# Patient Record
Sex: FEMALE | Race: OTHER | HISPANIC OR LATINO
[De-identification: names, ages, dates, MRNs, and addresses within clinical notes are randomized per-mention and may not be internally consistent; named-entity substitution may affect disease eponyms.]

---

## 2017-01-05 DIAGNOSIS — Z23 ENCOUNTER FOR IMMUNIZATION: ICD-10-CM

## 2017-01-23 ENCOUNTER — APPOINTMENT (OUTPATIENT)
Dept: FAMILY MEDICINE | Facility: HOSPITAL | Age: 32
End: 2017-01-23

## 2017-02-06 ENCOUNTER — APPOINTMENT (OUTPATIENT)
Dept: FAMILY MEDICINE | Facility: HOSPITAL | Age: 32
End: 2017-02-06

## 2017-02-12 ENCOUNTER — RESULT REVIEW (OUTPATIENT)
Age: 32
End: 2017-02-12

## 2017-02-13 ENCOUNTER — APPOINTMENT (OUTPATIENT)
Dept: FAMILY MEDICINE | Facility: HOSPITAL | Age: 32
End: 2017-02-13

## 2017-02-13 ENCOUNTER — OUTPATIENT (OUTPATIENT)
Dept: OUTPATIENT SERVICES | Facility: HOSPITAL | Age: 32
LOS: 1 days | End: 2017-02-13
Payer: SELF-PAY

## 2017-02-13 VITALS
HEART RATE: 84 BPM | SYSTOLIC BLOOD PRESSURE: 108 MMHG | TEMPERATURE: 97.6 F | RESPIRATION RATE: 16 BRPM | DIASTOLIC BLOOD PRESSURE: 70 MMHG

## 2017-02-13 PROCEDURE — 88305 TISSUE EXAM BY PATHOLOGIST: CPT | Mod: 26

## 2017-02-13 PROCEDURE — 57454 BX/CURETT OF CERVIX W/SCOPE: CPT

## 2017-02-13 PROCEDURE — 87491 CHLMYD TRACH DNA AMP PROBE: CPT

## 2017-02-13 PROCEDURE — 88305 TISSUE EXAM BY PATHOLOGIST: CPT

## 2017-02-13 PROCEDURE — 87591 N.GONORRHOEAE DNA AMP PROB: CPT

## 2017-02-13 PROCEDURE — G0463: CPT

## 2017-03-22 ENCOUNTER — EMERGENCY (EMERGENCY)
Facility: HOSPITAL | Age: 32
LOS: 1 days | Discharge: HOME | End: 2017-03-22
Admitting: EMERGENCY MEDICINE
Payer: MEDICAID

## 2017-03-22 PROCEDURE — 93010 ELECTROCARDIOGRAM REPORT: CPT

## 2017-03-22 PROCEDURE — 70450 CT HEAD/BRAIN W/O DYE: CPT | Mod: 26

## 2017-03-22 PROCEDURE — 99284 EMERGENCY DEPT VISIT MOD MDM: CPT

## 2017-03-22 PROCEDURE — 71010: CPT | Mod: 26

## 2017-03-23 PROCEDURE — 96375 TX/PRO/DX INJ NEW DRUG ADDON: CPT

## 2017-03-23 PROCEDURE — 99284 EMERGENCY DEPT VISIT MOD MDM: CPT | Mod: 25

## 2017-03-23 PROCEDURE — 85027 COMPLETE CBC AUTOMATED: CPT

## 2017-03-23 PROCEDURE — 80048 BASIC METABOLIC PNL TOTAL CA: CPT

## 2017-03-23 PROCEDURE — 81025 URINE PREGNANCY TEST: CPT

## 2017-03-23 PROCEDURE — 71045 X-RAY EXAM CHEST 1 VIEW: CPT

## 2017-03-23 PROCEDURE — 96374 THER/PROPH/DIAG INJ IV PUSH: CPT

## 2017-03-23 PROCEDURE — 70450 CT HEAD/BRAIN W/O DYE: CPT

## 2017-03-23 PROCEDURE — 93005 ELECTROCARDIOGRAM TRACING: CPT

## 2017-03-23 PROCEDURE — 84484 ASSAY OF TROPONIN QUANT: CPT

## 2017-03-23 PROCEDURE — 81003 URINALYSIS AUTO W/O SCOPE: CPT

## 2017-08-24 ENCOUNTER — OUTPATIENT (OUTPATIENT)
Dept: OUTPATIENT SERVICES | Facility: HOSPITAL | Age: 32
LOS: 1 days | End: 2017-08-24
Payer: SELF-PAY

## 2017-08-24 ENCOUNTER — RESULT REVIEW (OUTPATIENT)
Age: 32
End: 2017-08-24

## 2017-08-24 ENCOUNTER — APPOINTMENT (OUTPATIENT)
Dept: FAMILY MEDICINE | Facility: HOSPITAL | Age: 32
End: 2017-08-24

## 2017-08-24 VITALS
RESPIRATION RATE: 16 BRPM | OXYGEN SATURATION: 98 % | WEIGHT: 186 LBS | DIASTOLIC BLOOD PRESSURE: 78 MMHG | SYSTOLIC BLOOD PRESSURE: 116 MMHG | BODY MASS INDEX: 35.12 KG/M2 | HEIGHT: 61 IN | HEART RATE: 87 BPM | TEMPERATURE: 98 F

## 2017-08-24 DIAGNOSIS — M54.9 DORSALGIA, UNSPECIFIED: ICD-10-CM

## 2017-08-24 DIAGNOSIS — R10.32 LEFT LOWER QUADRANT PAIN: ICD-10-CM

## 2017-08-24 DIAGNOSIS — Z87.19 PERSONAL HISTORY OF OTHER DISEASES OF THE DIGESTIVE SYSTEM: ICD-10-CM

## 2017-08-24 DIAGNOSIS — R10.12 LEFT UPPER QUADRANT PAIN: ICD-10-CM

## 2017-08-24 DIAGNOSIS — R07.89 OTHER CHEST PAIN: ICD-10-CM

## 2017-08-24 PROCEDURE — 87624 HPV HI-RISK TYP POOLED RSLT: CPT

## 2017-08-24 PROCEDURE — 87591 N.GONORRHOEAE DNA AMP PROB: CPT

## 2017-08-24 PROCEDURE — 87563 M. GENITALIUM AMP PROBE: CPT

## 2017-08-24 PROCEDURE — 87801 DETECT AGNT MULT DNA AMPLI: CPT

## 2017-08-24 PROCEDURE — G0463: CPT

## 2017-08-24 PROCEDURE — 88175 CYTOPATH C/V AUTO FLUID REDO: CPT

## 2017-08-24 PROCEDURE — 87798 DETECT AGENT NOS DNA AMP: CPT

## 2017-08-24 PROCEDURE — 87491 CHLMYD TRACH DNA AMP PROBE: CPT

## 2017-08-31 LAB
CYTOLOGY CVX/VAG DOC THIN PREP: NORMAL
HPV I/H RISK 3 DNA ANAL QL PROBE+SIG AMP: NOT DETECTED

## 2017-10-23 ENCOUNTER — EMERGENCY (EMERGENCY)
Facility: HOSPITAL | Age: 32
LOS: 1 days | Discharge: HOME | End: 2017-10-23
Admitting: EMERGENCY MEDICINE
Payer: MEDICAID

## 2017-10-23 PROCEDURE — 99284 EMERGENCY DEPT VISIT MOD MDM: CPT | Mod: 25

## 2017-10-24 PROCEDURE — 93010 ELECTROCARDIOGRAM REPORT: CPT

## 2017-10-25 PROCEDURE — 80048 BASIC METABOLIC PNL TOTAL CA: CPT

## 2017-10-25 PROCEDURE — 96361 HYDRATE IV INFUSION ADD-ON: CPT

## 2017-10-25 PROCEDURE — 96374 THER/PROPH/DIAG INJ IV PUSH: CPT

## 2017-10-25 PROCEDURE — 81003 URINALYSIS AUTO W/O SCOPE: CPT

## 2017-10-25 PROCEDURE — 85027 COMPLETE CBC AUTOMATED: CPT

## 2017-10-25 PROCEDURE — 93005 ELECTROCARDIOGRAM TRACING: CPT

## 2017-10-25 PROCEDURE — 99284 EMERGENCY DEPT VISIT MOD MDM: CPT | Mod: 25

## 2017-12-12 ENCOUNTER — APPOINTMENT (OUTPATIENT)
Dept: FAMILY MEDICINE | Facility: HOSPITAL | Age: 32
End: 2017-12-12

## 2017-12-12 ENCOUNTER — OUTPATIENT (OUTPATIENT)
Dept: OUTPATIENT SERVICES | Facility: HOSPITAL | Age: 32
LOS: 1 days | End: 2017-12-12
Payer: SELF-PAY

## 2017-12-12 VITALS
OXYGEN SATURATION: 96 % | DIASTOLIC BLOOD PRESSURE: 90 MMHG | SYSTOLIC BLOOD PRESSURE: 137 MMHG | BODY MASS INDEX: 34.58 KG/M2 | HEART RATE: 67 BPM | TEMPERATURE: 98.8 F | WEIGHT: 183 LBS

## 2017-12-12 DIAGNOSIS — Z00.00 ENCOUNTER FOR GENERAL ADULT MEDICAL EXAMINATION WITHOUT ABNORMAL FINDINGS: ICD-10-CM

## 2017-12-12 PROCEDURE — G0008: CPT

## 2017-12-12 PROCEDURE — G0463: CPT

## 2017-12-14 DIAGNOSIS — R51 HEADACHE: ICD-10-CM

## 2017-12-14 DIAGNOSIS — Z23 ENCOUNTER FOR IMMUNIZATION: ICD-10-CM

## 2017-12-14 DIAGNOSIS — M54.9 DORSALGIA, UNSPECIFIED: ICD-10-CM

## 2018-01-29 ENCOUNTER — RESULT CHARGE (OUTPATIENT)
Age: 33
End: 2018-01-29

## 2018-01-29 ENCOUNTER — OUTPATIENT (OUTPATIENT)
Dept: OUTPATIENT SERVICES | Facility: HOSPITAL | Age: 33
LOS: 1 days | End: 2018-01-29
Payer: SELF-PAY

## 2018-01-29 ENCOUNTER — APPOINTMENT (OUTPATIENT)
Dept: FAMILY MEDICINE | Facility: HOSPITAL | Age: 33
End: 2018-01-29

## 2018-01-29 VITALS
WEIGHT: 183 LBS | OXYGEN SATURATION: 99 % | RESPIRATION RATE: 16 BRPM | TEMPERATURE: 98.4 F | HEART RATE: 85 BPM | SYSTOLIC BLOOD PRESSURE: 123 MMHG | DIASTOLIC BLOOD PRESSURE: 76 MMHG | BODY MASS INDEX: 34.55 KG/M2 | HEIGHT: 61 IN

## 2018-01-29 DIAGNOSIS — Z00.00 ENCOUNTER FOR GENERAL ADULT MEDICAL EXAMINATION WITHOUT ABNORMAL FINDINGS: ICD-10-CM

## 2018-01-29 LAB
HCG UR QL: NEGATIVE
QUALITY CONTROL: YES

## 2018-01-29 PROCEDURE — 87491 CHLMYD TRACH DNA AMP PROBE: CPT

## 2018-01-29 PROCEDURE — 87798 DETECT AGENT NOS DNA AMP: CPT

## 2018-01-29 PROCEDURE — 87801 DETECT AGNT MULT DNA AMPLI: CPT

## 2018-01-29 PROCEDURE — 87591 N.GONORRHOEAE DNA AMP PROB: CPT

## 2018-01-29 PROCEDURE — 87563 M. GENITALIUM AMP PROBE: CPT

## 2018-01-29 PROCEDURE — G0463: CPT

## 2018-01-31 DIAGNOSIS — N89.8 OTHER SPECIFIED NONINFLAMMATORY DISORDERS OF VAGINA: ICD-10-CM

## 2018-01-31 DIAGNOSIS — R10.2 PELVIC AND PERINEAL PAIN: ICD-10-CM

## 2018-05-10 ENCOUNTER — APPOINTMENT (OUTPATIENT)
Dept: FAMILY MEDICINE | Facility: HOSPITAL | Age: 33
End: 2018-05-10

## 2018-05-10 ENCOUNTER — RESULT CHARGE (OUTPATIENT)
Age: 33
End: 2018-05-10

## 2018-05-10 ENCOUNTER — OUTPATIENT (OUTPATIENT)
Dept: OUTPATIENT SERVICES | Facility: HOSPITAL | Age: 33
LOS: 1 days | End: 2018-05-10
Payer: COMMERCIAL

## 2018-05-10 VITALS
RESPIRATION RATE: 16 BRPM | SYSTOLIC BLOOD PRESSURE: 120 MMHG | DIASTOLIC BLOOD PRESSURE: 85 MMHG | TEMPERATURE: 98.6 F | HEART RATE: 109 BPM | WEIGHT: 181 LBS | BODY MASS INDEX: 34.2 KG/M2 | OXYGEN SATURATION: 99 %

## 2018-05-10 DIAGNOSIS — Z00.00 ENCOUNTER FOR GENERAL ADULT MEDICAL EXAMINATION WITHOUT ABNORMAL FINDINGS: ICD-10-CM

## 2018-05-10 DIAGNOSIS — N76.0 ACUTE VAGINITIS: ICD-10-CM

## 2018-05-10 DIAGNOSIS — B37.3 CANDIDIASIS OF VULVA AND VAGINA: ICD-10-CM

## 2018-05-10 DIAGNOSIS — Z87.898 PERSONAL HISTORY OF OTHER SPECIFIED CONDITIONS: ICD-10-CM

## 2018-05-10 DIAGNOSIS — Z87.39 PERSONAL HISTORY OF OTHER DISEASES OF THE MUSCULOSKELETAL SYSTEM AND CONNECTIVE TISSUE: ICD-10-CM

## 2018-05-10 DIAGNOSIS — Z92.29 PERSONAL HISTORY OF OTHER DRUG THERAPY: ICD-10-CM

## 2018-05-10 DIAGNOSIS — N89.8 OTHER SPECIFIED NONINFLAMMATORY DISORDERS OF VAGINA: ICD-10-CM

## 2018-05-10 DIAGNOSIS — N20.0 CALCULUS OF KIDNEY: ICD-10-CM

## 2018-05-10 DIAGNOSIS — Z98.890 OTHER SPECIFIED POSTPROCEDURAL STATES: ICD-10-CM

## 2018-05-10 DIAGNOSIS — R87.610 ATYPICAL SQUAMOUS CELLS OF UNDETERMINED SIGNIFICANCE ON CYTOLOGIC SMEAR OF CERVIX (ASC-US): ICD-10-CM

## 2018-05-10 DIAGNOSIS — Z86.19 PERSONAL HISTORY OF OTHER INFECTIOUS AND PARASITIC DISEASES: ICD-10-CM

## 2018-05-10 DIAGNOSIS — B96.89 ACUTE VAGINITIS: ICD-10-CM

## 2018-05-10 DIAGNOSIS — R07.89 OTHER CHEST PAIN: ICD-10-CM

## 2018-05-10 DIAGNOSIS — R87.810 ATYPICAL SQUAMOUS CELLS OF UNDETERMINED SIGNIFICANCE ON CYTOLOGIC SMEAR OF CERVIX (ASC-US): ICD-10-CM

## 2018-05-10 DIAGNOSIS — Z86.59 PERSONAL HISTORY OF OTHER MENTAL AND BEHAVIORAL DISORDERS: ICD-10-CM

## 2018-05-10 DIAGNOSIS — N87.0 MILD CERVICAL DYSPLASIA: ICD-10-CM

## 2018-05-10 DIAGNOSIS — Z87.42 PERSONAL HISTORY OF OTHER DISEASES OF THE FEMALE GENITAL TRACT: ICD-10-CM

## 2018-05-10 DIAGNOSIS — O92.6 GALACTORRHEA: ICD-10-CM

## 2018-05-10 PROCEDURE — 87798 DETECT AGENT NOS DNA AMP: CPT

## 2018-05-10 PROCEDURE — 36415 COLL VENOUS BLD VENIPUNCTURE: CPT

## 2018-05-10 PROCEDURE — 87070 CULTURE OTHR SPECIMN AEROBIC: CPT

## 2018-05-10 PROCEDURE — 87563 M. GENITALIUM AMP PROBE: CPT

## 2018-05-10 PROCEDURE — 84146 ASSAY OF PROLACTIN: CPT

## 2018-05-10 PROCEDURE — 87491 CHLMYD TRACH DNA AMP PROBE: CPT

## 2018-05-10 PROCEDURE — G0463: CPT

## 2018-05-10 PROCEDURE — 87591 N.GONORRHOEAE DNA AMP PROB: CPT

## 2018-05-10 PROCEDURE — 87801 DETECT AGNT MULT DNA AMPLI: CPT

## 2018-05-11 DIAGNOSIS — L53.9 ERYTHEMATOUS CONDITION, UNSPECIFIED: ICD-10-CM

## 2018-05-11 DIAGNOSIS — R10.2 PELVIC AND PERINEAL PAIN: ICD-10-CM

## 2018-05-11 DIAGNOSIS — N64.4 MASTODYNIA: ICD-10-CM

## 2018-05-16 ENCOUNTER — EMERGENCY (EMERGENCY)
Facility: HOSPITAL | Age: 33
LOS: 1 days | Discharge: HOME | End: 2018-05-16
Attending: EMERGENCY MEDICINE | Admitting: EMERGENCY MEDICINE
Payer: COMMERCIAL

## 2018-05-16 VITALS
OXYGEN SATURATION: 99 % | DIASTOLIC BLOOD PRESSURE: 86 MMHG | SYSTOLIC BLOOD PRESSURE: 134 MMHG | TEMPERATURE: 99 F | HEART RATE: 89 BPM | RESPIRATION RATE: 16 BRPM

## 2018-05-16 PROCEDURE — 99284 EMERGENCY DEPT VISIT MOD MDM: CPT | Mod: 25

## 2018-05-16 RX ORDER — VALACYCLOVIR 500 MG/1
1 TABLET, FILM COATED ORAL
Qty: 21 | Refills: 0 | OUTPATIENT
Start: 2018-05-16 | End: 2018-05-22

## 2018-05-16 RX ORDER — IBUPROFEN 200 MG
600 TABLET ORAL ONCE
Qty: 0 | Refills: 0 | Status: COMPLETED | OUTPATIENT
Start: 2018-05-16 | End: 2018-05-16

## 2018-05-16 RX ORDER — ACETAMINOPHEN 500 MG
975 TABLET ORAL ONCE
Qty: 0 | Refills: 0 | Status: COMPLETED | OUTPATIENT
Start: 2018-05-16 | End: 2018-05-16

## 2018-05-16 RX ADMIN — Medication 975 MILLIGRAM(S): at 23:41

## 2018-05-16 RX ADMIN — Medication 600 MILLIGRAM(S): at 23:41

## 2018-05-16 RX ADMIN — Medication 50 MILLIGRAM(S): at 23:41

## 2018-05-16 NOTE — ED PROVIDER NOTE - OBJECTIVE STATEMENT
[id number]    31 yo previously healthy F p/w 1 day hx of pinching L sided frontal HA that radiates to her occipital region associated with 1 week hx of R fingers numbness that progressed to her R shoulder. 3 d/a pt went to PCP for numbness     PCP: Lorin DAVIS: none [id number]    31 yo previously healthy F p/w 1 day hx of pinching L sided frontal HA that started and persisted at a 9/10 in severity that radiates to her occipital region associated with 1 week hx of bilat finger numbness radiating to her bilat shoulders. Pt had similar HA 6 years ago that resolved without imaging, this HA is similar and not worse than that HA, no other hx of HAs. Pt took ibuprofen today with some relief of HA. 3 d/a pt went to PCP for numbness in her bilat arms and was not given meds or imaging. Pt has also had R breast pain with a few days of R breast discharge.     ROS positive:     PCP: Lorin SIMONN: none [id number]      33 yo previously healthy F p/w 1 day hx of pinching L sided frontal HA that started and persisted at a 9/10 in severity that radiates to her occipital region associated with hearing loss in L ear, tinnitus in L ear, and 1 week hx of bilat finger numbness radiating to her bilat shoulders. Pt had similar HA 6 years ago that resolved without imaging, this HA is similar and not worse than that HA, no other hx of HAs. Pt took ibuprofen today with some relief of HA. 3 d/a pt went to PCP for numbness in her bilat arms and was not given meds or imaging. Pt has also had R breast pain with a few days of R breast discharge.     ROS positive: HA, bilat arm numbness, L ear hearing loss and tinnitus, breast discharge, chills, CP  ROS negative: fever, SOB, n/v, abdominal pain, dysuria, hematuria, congestion, runny nose, sore throat, visual changes, neck pain    PCP: Lorin DAVIS: none

## 2018-05-16 NOTE — ED PROVIDER NOTE - ATTENDING CONTRIBUTION TO CARE
31 yo previously healthy F p/w 1 day hx of pinching L sided frontal HA that started and persisted at a 9/10 in severity that radiates to her occipital region associated with pain noted, l facial with bell's palsy noted upper and lower mild in nature, steroids, pain control, ct head due to hearing loss b/l, non focal neuro exam. steroids started.

## 2018-05-16 NOTE — ED PROVIDER NOTE - PLAN OF CARE
1. You were seen for a headache. A copy of your resulted labs, imaging, and findings have been provided to you.  2. Take over the counter motrin 600 mg with food every six hours (do not exceed 3,200 mg in a 24 hour period) and supplement with tylenol 650 mg every six hours (do not exceed 4000 mg of tylenol in a 24 hour period and do not drink alcohol while taking tylenol) between the motrin dosages to have a layered effect. Consult a pharmacist or your primary care doctor with any questions.   3. Follow up with A NEUROLOGIST AND AN OBGYN AND your primary care doctor within 48 hours. Please call [phone] to make an appointment or with any questions you may have.  4. Return immediately to the emergency department for new, persistent, or worsening symptoms or signs. Return immediately to the emergency department if you have chest pain, shortness of breath, loss of consciousness, fever, neck pain, vision changes, or inability to move your arms or legs.

## 2018-05-16 NOTE — ED PROVIDER NOTE - CARE PLAN
Principal Discharge DX:	Headache Principal Discharge DX:	Headache  Assessment and plan of treatment:	1. You were seen for a headache. A copy of your resulted labs, imaging, and findings have been provided to you.  2. Take over the counter motrin 600 mg with food every six hours (do not exceed 3,200 mg in a 24 hour period) and supplement with tylenol 650 mg every six hours (do not exceed 4000 mg of tylenol in a 24 hour period and do not drink alcohol while taking tylenol) between the motrin dosages to have a layered effect. Consult a pharmacist or your primary care doctor with any questions.   3. Follow up with A NEUROLOGIST AND AN OBGYN AND your primary care doctor within 48 hours. Please call [phone] to make an appointment or with any questions you may have.  4. Return immediately to the emergency department for new, persistent, or worsening symptoms or signs. Return immediately to the emergency department if you have chest pain, shortness of breath, loss of consciousness, fever, neck pain, vision changes, or inability to move your arms or legs.

## 2018-05-16 NOTE — ED PROVIDER NOTE - PHYSICAL EXAMINATION
GENERAL: Well appearing, well nourished, awake, alert and in NAD  ENMT: Airway patent, Nasal mucosa clear. Mouth with MMM. Throat has no vesicles, no oropharyngeal exudates and uvula is midline.  EYES: conjunctiva clear, sclera anicteric, PERRL  CARDIAC: Regular rate, regular rhythm.  Heart sounds S1, S2, no S3, S4. No murmurs, rubs or gallops.  RESPIRATORY: Breath sounds clear and equal in bilateral anterior lung fields, no wheezes/ronchi/stridor; pt breathing and speaking comfortably with no increased WOB, no accessory mm. use, no intercostal retractions, no nasal flaring  GI: no ecchymosis, erythema, or lacerations, abdomen soft, non-distended, non-tender, no rebound or guarding. GENERAL: Well appearing, well nourished, awake, alert and in NAD  ENMT: Airway patent, Nasal mucosa clear. Mouth with MMM. Throat has no vesicles, no oropharyngeal exudates and uvula is midline.  EYES: conjunctiva clear, sclera anicteric, PERRL, no nystagmus bilat   CARDIAC: Regular rate, regular rhythm.  Heart sounds S1, S2, no S3, S4. No murmurs, rubs or gallops.  RESPIRATORY: Breath sounds clear and equal in bilateral anterior lung fields, no wheezes/ronchi/stridor; pt breathing and speaking comfortably with no increased WOB, no accessory mm. use, no intercostal retractions, no nasal flaring  GI: no ecchymosis, erythema, or lacerations, abdomen soft, non-distended, non-tender, no rebound or guarding.  NEURO: DIMINISHED HEARING IN L EAR, DECREASED ABILITY TO RAISE L EYEBROW, SLIGHT L FACIAL DROOP; pupils 4 mm, PERRL, EOMI (CN III, IV, VI), facial sensation intact to light touch in all 3 divisions bilat (CN V), face is symmetric with normal eye closure, eye opening (CN VII), palate elevates symmetrically, phonation is normal (CN IX, X),  shoulder shrug intact bilat (CN XI), tongue is midline with nl movements and no atrophy (CN XII), finger to nose test nl bilat, negative pronator drift bilat, negative Romberg, speech is clear; 5/5 motor strength BUE and BLE: deltoids, biceps, triceps, wrist flexors/extensors, hand , hip flexors, knee flexors/extensors, plantar/dorsiflexors, hallux flexors/extensors; sensation intact to light touch BUE and BLE: C5-T1 and L3-S1; gait wnl GENERAL: Well appearing, well nourished, awake, alert and in NAD  ENMT: Airway patent, Nasal mucosa clear. Mouth with MMM. Throat has no vesicles, no oropharyngeal exudates and uvula is midline.  EYES: conjunctiva clear, sclera anicteric, PERRL, no nystagmus bilat   CARDIAC: Regular rate, regular rhythm.  Heart sounds S1, S2, no S3, S4. No murmurs, rubs or gallops.  RESPIRATORY: Breath sounds clear and equal in bilateral anterior lung fields, no wheezes/ronchi/stridor; pt breathing and speaking comfortably with no increased WOB, no accessory mm. use, no intercostal retractions, no nasal flaring  GI: no ecchymosis, erythema, or lacerations, abdomen soft, non-distended, non-tender, no rebound or guarding.  NEURO: DIMINISHED HEARING IN L EAR, DECREASED ABILITY TO RAISE L EYEBROW, SLIGHT L FACIAL DROOP; pupils 4 mm, PERRL, EOMI (CN III, IV, VI), facial sensation intact to light touch in all 3 divisions bilat (CN V), face is symmetric with normal eye closure, eye opening (CN VII), palate elevates symmetrically, phonation is normal (CN IX, X),  shoulder shrug intact bilat (CN XI), tongue is midline with nl movements and no atrophy (CN XII), finger to nose test nl bilat, negative pronator drift bilat, negative Romberg, speech is clear; 5/5 motor strength BUE and BLE: deltoids, biceps, triceps, wrist flexors/extensors, hand , hip flexors, knee flexors/extensors, plantar/dorsiflexors, hallux flexors/extensors; sensation intact to light touch BUE and BLE: C5-T1 and L3-S1; gait wnl  breasts: (maia Urias RN) symmetric bilat breasts with no overlying erythema or ecchymosis or laceration or deformity or warmth, +TTP R breast diffusely with no palpable masses, no L nipple d/c

## 2018-05-17 VITALS
OXYGEN SATURATION: 100 % | DIASTOLIC BLOOD PRESSURE: 87 MMHG | SYSTOLIC BLOOD PRESSURE: 126 MMHG | RESPIRATION RATE: 17 BRPM | HEART RATE: 81 BPM | TEMPERATURE: 99 F

## 2018-05-17 PROCEDURE — 99284 EMERGENCY DEPT VISIT MOD MDM: CPT | Mod: 25

## 2018-05-17 PROCEDURE — 70450 CT HEAD/BRAIN W/O DYE: CPT

## 2018-05-17 PROCEDURE — 70450 CT HEAD/BRAIN W/O DYE: CPT | Mod: 26

## 2018-05-17 RX ORDER — NAPROXEN 500 MG/1
500 TABLET ORAL
Qty: 14 | Refills: 0 | Status: DISCONTINUED | COMMUNITY
Start: 2017-12-12 | End: 2018-05-17

## 2018-05-17 RX ORDER — NAPROXEN 500 MG/1
500 TABLET ORAL
Qty: 14 | Refills: 0 | Status: DISCONTINUED | COMMUNITY
Start: 2017-08-24 | End: 2018-05-17

## 2018-05-17 RX ORDER — VALACYCLOVIR 500 MG/1
1000 TABLET, FILM COATED ORAL ONCE
Qty: 0 | Refills: 0 | Status: COMPLETED | OUTPATIENT
Start: 2018-05-17 | End: 2018-05-17

## 2018-05-17 RX ORDER — CYCLOBENZAPRINE HYDROCHLORIDE 5 MG/1
5 TABLET, FILM COATED ORAL
Qty: 21 | Refills: 0 | Status: DISCONTINUED | COMMUNITY
Start: 2017-12-12 | End: 2018-05-17

## 2018-05-17 RX ORDER — TERCONAZOLE 8 MG/G
0.8 CREAM VAGINAL
Qty: 3 | Refills: 0 | Status: DISCONTINUED | COMMUNITY
Start: 2018-02-12 | End: 2018-05-17

## 2018-05-17 RX ADMIN — VALACYCLOVIR 1000 MILLIGRAM(S): 500 TABLET, FILM COATED ORAL at 00:40

## 2018-05-17 NOTE — ED ADULT NURSE NOTE - OBJECTIVE STATEMENT
31 yo female pt [id number] : 31 yo female pt presents to ed complaining of one day of left sided/frontal headache. as per pt the pain is a 9/10 and has had no relief with motrin. pt also complaining of left ear hearing loss, tinnitus in left ear. pt states she had a similar headache 6 years ago that resolved without imaging, pt states "but this time it is worse." pt states "I went to my doctor because I had numbness in both of my arms and my doctor said to come here." Patient is A&Ox4. Face is symmetrical. PERRL 3mmB. Speech is clear. Patient is moving all extremities with 5/5 strength and walks with steady gait. pt denies photophobia/n/v/dizziness/chest pain/palpitations/sob/numbness/tingling. no sensory or motor loss noted. breath sounds clear and equal bilaterally. skin warm dry and intact. pt follows commands. abd nontender and nondistended.

## 2018-05-17 NOTE — ED ADULT NURSE NOTE - CHPI ED SYMPTOMS NEG
no weakness/no blurred vision/no change in level of consciousness/no loss of consciousness/no nausea/no numbness/no confusion/no dizziness/no fever/no vomiting

## 2018-05-21 ENCOUNTER — APPOINTMENT (OUTPATIENT)
Dept: OBGYN | Facility: CLINIC | Age: 33
End: 2018-05-21

## 2018-07-26 ENCOUNTER — MOBILE ON CALL (OUTPATIENT)
Age: 33
End: 2018-07-26

## 2018-08-03 ENCOUNTER — MOBILE ON CALL (OUTPATIENT)
Age: 33
End: 2018-08-03

## 2018-08-08 ENCOUNTER — OUTPATIENT (OUTPATIENT)
Dept: OUTPATIENT SERVICES | Facility: HOSPITAL | Age: 33
LOS: 1 days | End: 2018-08-08
Payer: COMMERCIAL

## 2018-08-08 ENCOUNTER — APPOINTMENT (OUTPATIENT)
Dept: FAMILY MEDICINE | Facility: HOSPITAL | Age: 33
End: 2018-08-08

## 2018-08-08 VITALS
TEMPERATURE: 98.8 F | WEIGHT: 179 LBS | RESPIRATION RATE: 16 BRPM | HEIGHT: 61 IN | HEART RATE: 92 BPM | DIASTOLIC BLOOD PRESSURE: 85 MMHG | BODY MASS INDEX: 33.79 KG/M2 | OXYGEN SATURATION: 99 % | SYSTOLIC BLOOD PRESSURE: 134 MMHG

## 2018-08-08 DIAGNOSIS — Z00.00 ENCOUNTER FOR GENERAL ADULT MEDICAL EXAMINATION WITHOUT ABNORMAL FINDINGS: ICD-10-CM

## 2018-08-08 LAB — HBA1C MFR BLD HPLC: 11.4 %

## 2018-08-08 PROCEDURE — G0463: CPT

## 2018-08-08 PROCEDURE — 80048 BASIC METABOLIC PNL TOTAL CA: CPT

## 2018-08-08 RX ORDER — BLOOD-GLUCOSE METER
EACH MISCELLANEOUS
Qty: 1 | Refills: 0 | Status: ACTIVE | COMMUNITY
Start: 2018-08-08 | End: 1900-01-01

## 2018-08-08 RX ORDER — BLOOD SUGAR DIAGNOSTIC
STRIP MISCELLANEOUS
Qty: 1 | Refills: 2 | Status: ACTIVE | COMMUNITY
Start: 2018-08-08 | End: 1900-01-01

## 2018-08-08 RX ORDER — LANCETS
EACH MISCELLANEOUS
Qty: 1 | Refills: 0 | Status: ACTIVE | COMMUNITY
Start: 2018-08-08 | End: 1900-01-01

## 2018-08-08 NOTE — ASSESSMENT
[FreeTextEntry1] : #Diabetes\par - BfH8o=15.4\par - Insulin supplies sent to pharmacy\par - Start Novolin 10 units before breakfast and dinner\par - Will obtain BMP prior to starting Metformin\par - CDE referral\par \par FUV in 2 weeks with PCP for DM visit

## 2018-08-08 NOTE — HISTORY OF PRESENT ILLNESS
[FreeTextEntry8] : 33 year old female presenting for HbA1c results. Patient found to have HbA1c = 11.4. She is not currently taking DM medications. Patient feels well and has no complaints today.

## 2018-08-08 NOTE — PHYSICAL EXAM
[No Acute Distress] : no acute distress [Well Nourished] : well nourished [Well Developed] : well developed [Well-Appearing] : well-appearing [No Respiratory Distress] : no respiratory distress  [Clear to Auscultation] : lungs were clear to auscultation bilaterally [No Accessory Muscle Use] : no accessory muscle use [Normal Rate] : normal rate  [Regular Rhythm] : with a regular rhythm [Normal S1, S2] : normal S1 and S2 [No Edema] : there was no peripheral edema [Soft] : abdomen soft [Non Tender] : non-tender [de-identified] : obese

## 2018-08-09 DIAGNOSIS — E11.9 TYPE 2 DIABETES MELLITUS WITHOUT COMPLICATIONS: ICD-10-CM

## 2018-08-10 LAB
ANION GAP SERPL CALC-SCNC: 23 MMOL/L
BUN SERPL-MCNC: 6 MG/DL
CALCIUM SERPL-MCNC: 9.4 MG/DL
CHLORIDE SERPL-SCNC: 93 MMOL/L
CO2 SERPL-SCNC: 21 MMOL/L
CREAT SERPL-MCNC: 0.6 MG/DL
GLUCOSE SERPL-MCNC: 230 MG/DL
POTASSIUM SERPL-SCNC: 3.6 MMOL/L
SODIUM SERPL-SCNC: 137 MMOL/L

## 2018-08-13 ENCOUNTER — CHART COPY (OUTPATIENT)
Age: 33
End: 2018-08-13

## 2018-08-20 ENCOUNTER — OTHER (OUTPATIENT)
Age: 33
End: 2018-08-20

## 2018-08-25 ENCOUNTER — APPOINTMENT (OUTPATIENT)
Dept: FAMILY MEDICINE | Facility: HOSPITAL | Age: 33
End: 2018-08-25

## 2018-08-28 ENCOUNTER — APPOINTMENT (OUTPATIENT)
Dept: FAMILY MEDICINE | Facility: HOSPITAL | Age: 33
End: 2018-08-28

## 2018-08-30 ENCOUNTER — MESSAGE (OUTPATIENT)
Age: 33
End: 2018-08-30

## 2018-08-30 ENCOUNTER — APPOINTMENT (OUTPATIENT)
Dept: FAMILY MEDICINE | Facility: HOSPITAL | Age: 33
End: 2018-08-30

## 2018-09-04 ENCOUNTER — OTHER (OUTPATIENT)
Age: 33
End: 2018-09-04

## 2018-09-10 ENCOUNTER — RESULT REVIEW (OUTPATIENT)
Age: 33
End: 2018-09-10

## 2018-10-04 ENCOUNTER — APPOINTMENT (OUTPATIENT)
Dept: NEUROLOGY | Facility: HOSPITAL | Age: 33
End: 2018-10-04

## 2018-11-30 ENCOUNTER — EMERGENCY (EMERGENCY)
Facility: HOSPITAL | Age: 33
LOS: 1 days | Discharge: HOME | End: 2018-11-30
Attending: EMERGENCY MEDICINE | Admitting: EMERGENCY MEDICINE
Payer: COMMERCIAL

## 2018-11-30 VITALS
TEMPERATURE: 98 F | SYSTOLIC BLOOD PRESSURE: 139 MMHG | OXYGEN SATURATION: 99 % | HEART RATE: 100 BPM | WEIGHT: 160.06 LBS | RESPIRATION RATE: 17 BRPM | DIASTOLIC BLOOD PRESSURE: 90 MMHG | HEIGHT: 64 IN

## 2018-11-30 LAB
ANION GAP SERPL CALC-SCNC: 12 MMOL/L — SIGNIFICANT CHANGE UP (ref 5–17)
APPEARANCE UR: SIGNIFICANT CHANGE UP
APTT BLD: 32.9 SEC — SIGNIFICANT CHANGE UP (ref 27.5–36.3)
BACTERIA # UR AUTO: ABNORMAL /HPF
BASOPHILS # BLD AUTO: 0.1 K/UL — SIGNIFICANT CHANGE UP (ref 0–0.2)
BASOPHILS NFR BLD AUTO: 0.9 % — SIGNIFICANT CHANGE UP (ref 0–2)
BILIRUB UR-MCNC: NEGATIVE — SIGNIFICANT CHANGE UP
BLD GP AB SCN SERPL QL: SIGNIFICANT CHANGE UP
BUN SERPL-MCNC: 7 MG/DL — SIGNIFICANT CHANGE UP (ref 7–23)
CALCIUM SERPL-MCNC: 8.9 MG/DL — SIGNIFICANT CHANGE UP (ref 8.4–10.5)
CHLORIDE SERPL-SCNC: 101 MMOL/L — SIGNIFICANT CHANGE UP (ref 96–108)
CO2 SERPL-SCNC: 24 MMOL/L — SIGNIFICANT CHANGE UP (ref 22–31)
COLOR SPEC: YELLOW — SIGNIFICANT CHANGE UP
CREAT SERPL-MCNC: 0.71 MG/DL — SIGNIFICANT CHANGE UP (ref 0.5–1.3)
DIFF PNL FLD: ABNORMAL
EOSINOPHIL # BLD AUTO: 0.1 K/UL — SIGNIFICANT CHANGE UP (ref 0–0.5)
EOSINOPHIL NFR BLD AUTO: 1.5 % — SIGNIFICANT CHANGE UP (ref 0–6)
EPI CELLS # UR: ABNORMAL
GLUCOSE SERPL-MCNC: 178 MG/DL — HIGH (ref 70–99)
GLUCOSE UR QL: NEGATIVE — SIGNIFICANT CHANGE UP
HCG SERPL-ACNC: 3140 MIU/ML — HIGH
HCT VFR BLD CALC: 44.7 % — SIGNIFICANT CHANGE UP (ref 34.5–45)
HGB BLD-MCNC: 14.8 G/DL — SIGNIFICANT CHANGE UP (ref 11.5–15.5)
INR BLD: 1.09 RATIO — SIGNIFICANT CHANGE UP (ref 0.88–1.16)
KETONES UR-MCNC: NEGATIVE — SIGNIFICANT CHANGE UP
LEUKOCYTE ESTERASE UR-ACNC: NEGATIVE — SIGNIFICANT CHANGE UP
LYMPHOCYTES # BLD AUTO: 2.3 K/UL — SIGNIFICANT CHANGE UP (ref 1–3.3)
LYMPHOCYTES # BLD AUTO: 29 % — SIGNIFICANT CHANGE UP (ref 13–44)
MCHC RBC-ENTMCNC: 28.8 PG — SIGNIFICANT CHANGE UP (ref 27–34)
MCHC RBC-ENTMCNC: 33.1 GM/DL — SIGNIFICANT CHANGE UP (ref 32–36)
MCV RBC AUTO: 86.9 FL — SIGNIFICANT CHANGE UP (ref 80–100)
MONOCYTES # BLD AUTO: 0.5 K/UL — SIGNIFICANT CHANGE UP (ref 0–0.9)
MONOCYTES NFR BLD AUTO: 6.6 % — SIGNIFICANT CHANGE UP (ref 1–9)
NEUTROPHILS # BLD AUTO: 5 K/UL — SIGNIFICANT CHANGE UP (ref 1.8–7.4)
NEUTROPHILS NFR BLD AUTO: 61.9 % — SIGNIFICANT CHANGE UP (ref 43–77)
NITRITE UR-MCNC: NEGATIVE — SIGNIFICANT CHANGE UP
PH UR: 6 — SIGNIFICANT CHANGE UP (ref 5–8)
PLATELET # BLD AUTO: 241 K/UL — SIGNIFICANT CHANGE UP (ref 150–400)
POTASSIUM SERPL-MCNC: 3.7 MMOL/L — SIGNIFICANT CHANGE UP (ref 3.5–5.3)
POTASSIUM SERPL-SCNC: 3.7 MMOL/L — SIGNIFICANT CHANGE UP (ref 3.5–5.3)
PROT UR-MCNC: 15
PROTHROM AB SERPL-ACNC: 12.2 SEC — SIGNIFICANT CHANGE UP (ref 10–12.9)
RBC # BLD: 5.14 M/UL — SIGNIFICANT CHANGE UP (ref 3.8–5.2)
RBC # FLD: 11.6 % — SIGNIFICANT CHANGE UP (ref 10.3–14.5)
RBC CASTS # UR COMP ASSIST: ABNORMAL /HPF (ref 0–4)
SODIUM SERPL-SCNC: 137 MMOL/L — SIGNIFICANT CHANGE UP (ref 135–145)
SP GR SPEC: 1.02 — SIGNIFICANT CHANGE UP (ref 1.01–1.02)
UROBILINOGEN FLD QL: NEGATIVE — SIGNIFICANT CHANGE UP
WBC # BLD: 8.1 K/UL — SIGNIFICANT CHANGE UP (ref 3.8–10.5)
WBC # FLD AUTO: 8.1 K/UL — SIGNIFICANT CHANGE UP (ref 3.8–10.5)
WBC UR QL: ABNORMAL /HPF (ref 0–5)

## 2018-11-30 PROCEDURE — 81025 URINE PREGNANCY TEST: CPT

## 2018-11-30 PROCEDURE — 86850 RBC ANTIBODY SCREEN: CPT

## 2018-11-30 PROCEDURE — 99284 EMERGENCY DEPT VISIT MOD MDM: CPT

## 2018-11-30 PROCEDURE — 80048 BASIC METABOLIC PNL TOTAL CA: CPT

## 2018-11-30 PROCEDURE — 85027 COMPLETE CBC AUTOMATED: CPT

## 2018-11-30 PROCEDURE — 76817 TRANSVAGINAL US OBSTETRIC: CPT

## 2018-11-30 PROCEDURE — 85730 THROMBOPLASTIN TIME PARTIAL: CPT

## 2018-11-30 PROCEDURE — 85610 PROTHROMBIN TIME: CPT

## 2018-11-30 PROCEDURE — 84702 CHORIONIC GONADOTROPIN TEST: CPT

## 2018-11-30 PROCEDURE — 86900 BLOOD TYPING SEROLOGIC ABO: CPT

## 2018-11-30 PROCEDURE — 86901 BLOOD TYPING SEROLOGIC RH(D): CPT

## 2018-11-30 PROCEDURE — 76817 TRANSVAGINAL US OBSTETRIC: CPT | Mod: 26

## 2018-11-30 PROCEDURE — 81001 URINALYSIS AUTO W/SCOPE: CPT

## 2018-11-30 RX ORDER — ACETAMINOPHEN 500 MG
975 TABLET ORAL ONCE
Qty: 0 | Refills: 0 | Status: COMPLETED | OUTPATIENT
Start: 2018-11-30 | End: 2018-11-30

## 2018-11-30 RX ORDER — MORPHINE SULFATE 50 MG/1
2 CAPSULE, EXTENDED RELEASE ORAL ONCE
Qty: 0 | Refills: 0 | Status: DISCONTINUED | OUTPATIENT
Start: 2018-11-30 | End: 2018-11-30

## 2018-11-30 RX ORDER — SODIUM CHLORIDE 9 MG/ML
1000 INJECTION INTRAMUSCULAR; INTRAVENOUS; SUBCUTANEOUS ONCE
Qty: 0 | Refills: 0 | Status: COMPLETED | OUTPATIENT
Start: 2018-11-30 | End: 2018-11-30

## 2018-11-30 RX ADMIN — SODIUM CHLORIDE 1000 MILLILITER(S): 9 INJECTION INTRAMUSCULAR; INTRAVENOUS; SUBCUTANEOUS at 09:21

## 2018-11-30 RX ADMIN — Medication 975 MILLIGRAM(S): at 09:29

## 2018-11-30 RX ADMIN — Medication 975 MILLIGRAM(S): at 09:59

## 2018-11-30 NOTE — ED PROVIDER NOTE - OBJECTIVE STATEMENT
32 yo female  pw vaginal bleeding and pain. pt states she is approx 6 weeks pregnant. no fever or chills.

## 2018-12-01 ENCOUNTER — EMERGENCY (EMERGENCY)
Facility: HOSPITAL | Age: 33
LOS: 1 days | Discharge: HOME | End: 2018-12-01
Attending: EMERGENCY MEDICINE | Admitting: EMERGENCY MEDICINE
Payer: COMMERCIAL

## 2018-12-01 VITALS
TEMPERATURE: 98 F | HEART RATE: 98 BPM | SYSTOLIC BLOOD PRESSURE: 141 MMHG | OXYGEN SATURATION: 100 % | DIASTOLIC BLOOD PRESSURE: 79 MMHG | RESPIRATION RATE: 116 BRPM | WEIGHT: 149.91 LBS

## 2018-12-01 VITALS
SYSTOLIC BLOOD PRESSURE: 132 MMHG | OXYGEN SATURATION: 96 % | HEART RATE: 92 BPM | DIASTOLIC BLOOD PRESSURE: 89 MMHG | RESPIRATION RATE: 17 BRPM | TEMPERATURE: 99 F

## 2018-12-01 DIAGNOSIS — N93.9 ABNORMAL UTERINE AND VAGINAL BLEEDING, UNSPECIFIED: ICD-10-CM

## 2018-12-01 LAB
ALBUMIN SERPL ELPH-MCNC: 4.3 G/DL — SIGNIFICANT CHANGE UP (ref 3.3–5)
ALP SERPL-CCNC: 95 U/L — SIGNIFICANT CHANGE UP (ref 40–120)
ALT FLD-CCNC: 29 U/L DA — SIGNIFICANT CHANGE UP (ref 10–45)
ANION GAP SERPL CALC-SCNC: 10 MMOL/L — SIGNIFICANT CHANGE UP (ref 5–17)
APPEARANCE UR: ABNORMAL
AST SERPL-CCNC: 30 U/L — SIGNIFICANT CHANGE UP (ref 10–40)
BACTERIA # UR AUTO: NEGATIVE /HPF — SIGNIFICANT CHANGE UP
BASOPHILS # BLD AUTO: 0.1 K/UL — SIGNIFICANT CHANGE UP (ref 0–0.2)
BASOPHILS NFR BLD AUTO: 0.7 % — SIGNIFICANT CHANGE UP (ref 0–2)
BILIRUB SERPL-MCNC: 0.5 MG/DL — SIGNIFICANT CHANGE UP (ref 0.2–1.2)
BILIRUB UR-MCNC: ABNORMAL
BUN SERPL-MCNC: 6 MG/DL — LOW (ref 7–23)
CALCIUM SERPL-MCNC: 8.8 MG/DL — SIGNIFICANT CHANGE UP (ref 8.4–10.5)
CHLORIDE SERPL-SCNC: 102 MMOL/L — SIGNIFICANT CHANGE UP (ref 96–108)
CO2 SERPL-SCNC: 28 MMOL/L — SIGNIFICANT CHANGE UP (ref 22–31)
COLOR SPEC: ABNORMAL
CREAT SERPL-MCNC: 0.69 MG/DL — SIGNIFICANT CHANGE UP (ref 0.5–1.3)
DIFF PNL FLD: ABNORMAL
EOSINOPHIL # BLD AUTO: 0.1 K/UL — SIGNIFICANT CHANGE UP (ref 0–0.5)
EOSINOPHIL NFR BLD AUTO: 1.4 % — SIGNIFICANT CHANGE UP (ref 0–6)
EPI CELLS # UR: SIGNIFICANT CHANGE UP
GLUCOSE SERPL-MCNC: 135 MG/DL — HIGH (ref 70–99)
GLUCOSE UR QL: NEGATIVE — SIGNIFICANT CHANGE UP
HCG SERPL-ACNC: 2836 MIU/ML — HIGH
HCT VFR BLD CALC: 42.2 % — SIGNIFICANT CHANGE UP (ref 34.5–45)
HGB BLD-MCNC: 14.4 G/DL — SIGNIFICANT CHANGE UP (ref 11.5–15.5)
KETONES UR-MCNC: ABNORMAL
LEUKOCYTE ESTERASE UR-ACNC: ABNORMAL
LYMPHOCYTES # BLD AUTO: 2.3 K/UL — SIGNIFICANT CHANGE UP (ref 1–3.3)
LYMPHOCYTES # BLD AUTO: 27.6 % — SIGNIFICANT CHANGE UP (ref 13–44)
MCHC RBC-ENTMCNC: 29.4 PG — SIGNIFICANT CHANGE UP (ref 27–34)
MCHC RBC-ENTMCNC: 34.3 GM/DL — SIGNIFICANT CHANGE UP (ref 32–36)
MCV RBC AUTO: 85.8 FL — SIGNIFICANT CHANGE UP (ref 80–100)
MONOCYTES # BLD AUTO: 0.5 K/UL — SIGNIFICANT CHANGE UP (ref 0–0.9)
MONOCYTES NFR BLD AUTO: 5.6 % — SIGNIFICANT CHANGE UP (ref 1–9)
NEUTROPHILS # BLD AUTO: 5.5 K/UL — SIGNIFICANT CHANGE UP (ref 1.8–7.4)
NEUTROPHILS NFR BLD AUTO: 64.7 % — SIGNIFICANT CHANGE UP (ref 43–77)
NITRITE UR-MCNC: NEGATIVE — SIGNIFICANT CHANGE UP
PH UR: 5 — SIGNIFICANT CHANGE UP (ref 5–8)
PLATELET # BLD AUTO: 254 K/UL — SIGNIFICANT CHANGE UP (ref 150–400)
POTASSIUM SERPL-MCNC: 3.8 MMOL/L — SIGNIFICANT CHANGE UP (ref 3.5–5.3)
POTASSIUM SERPL-SCNC: 3.8 MMOL/L — SIGNIFICANT CHANGE UP (ref 3.5–5.3)
PROT SERPL-MCNC: 8 G/DL — SIGNIFICANT CHANGE UP (ref 6–8.3)
PROT UR-MCNC: 100
RBC # BLD: 4.91 M/UL — SIGNIFICANT CHANGE UP (ref 3.8–5.2)
RBC # FLD: 11.4 % — SIGNIFICANT CHANGE UP (ref 10.3–14.5)
RBC CASTS # UR COMP ASSIST: >50 /HPF (ref 0–4)
SODIUM SERPL-SCNC: 140 MMOL/L — SIGNIFICANT CHANGE UP (ref 135–145)
SP GR SPEC: 1.02 — SIGNIFICANT CHANGE UP (ref 1.01–1.02)
UROBILINOGEN FLD QL: NEGATIVE — SIGNIFICANT CHANGE UP
WBC # BLD: 8.4 K/UL — SIGNIFICANT CHANGE UP (ref 3.8–10.5)
WBC # FLD AUTO: 8.4 K/UL — SIGNIFICANT CHANGE UP (ref 3.8–10.5)
WBC UR QL: NEGATIVE /HPF — SIGNIFICANT CHANGE UP (ref 0–5)

## 2018-12-01 PROCEDURE — 99284 EMERGENCY DEPT VISIT MOD MDM: CPT

## 2018-12-01 PROCEDURE — 76817 TRANSVAGINAL US OBSTETRIC: CPT

## 2018-12-01 PROCEDURE — 87086 URINE CULTURE/COLONY COUNT: CPT

## 2018-12-01 PROCEDURE — 81001 URINALYSIS AUTO W/SCOPE: CPT

## 2018-12-01 PROCEDURE — 84702 CHORIONIC GONADOTROPIN TEST: CPT

## 2018-12-01 PROCEDURE — 80053 COMPREHEN METABOLIC PANEL: CPT

## 2018-12-01 PROCEDURE — 76817 TRANSVAGINAL US OBSTETRIC: CPT | Mod: 26

## 2018-12-01 PROCEDURE — 85027 COMPLETE CBC AUTOMATED: CPT

## 2018-12-01 RX ORDER — IBUPROFEN 200 MG
600 TABLET ORAL ONCE
Qty: 0 | Refills: 0 | Status: COMPLETED | OUTPATIENT
Start: 2018-12-01 | End: 2018-12-01

## 2018-12-01 RX ADMIN — Medication 600 MILLIGRAM(S): at 19:45

## 2018-12-01 RX ADMIN — Medication 600 MILLIGRAM(S): at 20:45

## 2018-12-01 NOTE — ED PROVIDER NOTE - CARE PLAN
Principal Discharge DX:	Vaginal bleeding  Secondary Diagnosis:	Less than 8 weeks gestation of pregnancy

## 2018-12-01 NOTE — ED PROVIDER NOTE - OBJECTIVE STATEMENT
Via  Padma # [id number]:  34 y/o F  6 weeks pregnant  LMP 18 presenting with h/o gestational diabetes, HTN presenting with vaginal bleeding since yesterday. Seen here in ED, US confirmed IUP with low heart rate. Patients states she was told to come back if bleeding continued. States pelvic cramping, no pain. Followed with [facility] ([place]) has follow up appt Wednesday.   Daily meds: labetalol, metformin

## 2018-12-01 NOTE — ED PROVIDER NOTE - ATTENDING CONTRIBUTION TO CARE
none Dr. Gould: I performed a face to face bedside interview with patient regarding history of present illness, review of symptoms and past medical history. I completed an independent physical exam.  I have discussed patient's plan of care with PA.   I agree with note as stated above, having amended the EMR as needed to reflect my findings.   This includes HISTORY OF PRESENT ILLNESS, HIV, PAST MEDICAL/SURGICAL/FAMILY/SOCIAL HISTORY, ALLERGIES AND HOME MEDICATIONS, REVIEW OF SYSTEMS, PHYSICAL EXAM, and any PROGRESS NOTES during the time I functioned as the attending physician for this patient.  abd soft non tender B Bryanna PANIAGUA

## 2018-12-01 NOTE — ED PROVIDER NOTE - MEDICAL DECISION MAKING DETAILS
32 y/o F  6 weeks pregnant  LMP 18 presenting with vaginal bleeding since yesterday with confirmed IUP and appropriate BHCG yesterday with follow up OBGYN appt this Wednesday- touch base with [facility], confirm follow up,

## 2018-12-01 NOTE — ED PROVIDER NOTE - MEDICAL DECISION MAKING DETAILS
34 y/o F  with increased vaginal bleeding and "passing tissue" likely miscarrying- labs with HCG, transvaginal US, OBGYN follow up scheduled wednesday.

## 2018-12-01 NOTE — ED PROVIDER NOTE - PROGRESS NOTE DETAILS
IVON Joe- called [facility]- appt confirmed for this Wednesday- it will be her first, she is not an established pt yet. US done yesterday here confirming IUP. blood type O (+). Pt likely starting to miscarry. Pelvic exam not elicited tenderness nor discharge.  Will DC with OBGYN follow up as scheduled Wednesday. d/w pt no need for repeat us today, will f/u in gyn office on 12/5, advised to call on 12/3 for earlier russ

## 2018-12-01 NOTE — ED PROVIDER NOTE - PROGRESS NOTE DETAILS
IVON Garcia YOLIS trending down approx 3100 to 2800. Transvaginal US performed. Awaiting results. Dr. Gould to continue to follow.

## 2018-12-01 NOTE — ED PROVIDER NOTE - ATTENDING CONTRIBUTION TO CARE
Dr. Wilson: I performed a face to face bedside interview with patient regarding history of present illness, review of symptoms and past medical history. I completed an independent physical exam.  I have discussed patient's plan of care with PA.   I agree with note as stated above, having amended the EMR as needed to reflect my findings.   This includes HISTORY OF PRESENT ILLNESS, HIV, PAST MEDICAL/SURGICAL/FAMILY/SOCIAL HISTORY, ALLERGIES AND HOME MEDICATIONS, REVIEW OF SYSTEMS, PHYSICAL EXAM, and any PROGRESS NOTES during the time I functioned as the attending physician for this patient.    dr wilson: 32 y/o F  6 weeks pregnant  LMP 18 presenting with vaginal bleeding since yesterday with confirmed IUP and appropriate BHCG yesterday with follow up OBGYN appt this Wednesday- gyn f/u

## 2018-12-01 NOTE — ED ADULT NURSE NOTE - NSIMPLEMENTINTERV_GEN_ALL_ED
Implemented All Universal Safety Interventions:  [place] to call system. Call bell, personal items and telephone within reach. Instruct patient to call for assistance. Room bathroom lighting operational. Non-slip footwear when patient is off stretcher. Physically safe environment: no spills, clutter or unnecessary equipment. Stretcher in lowest position, wheels locked, appropriate side rails in place.

## 2018-12-01 NOTE — ED PROVIDER NOTE - OBJECTIVE STATEMENT
34 y/o F with h/o DM, HTN 6 weeks pregnant  seen here earlier today with vaginal bleeding returns with increased vaginal bleeding and "passing tissue" when she went home. Denies fever, chills, abdominal pain, dysuria, nausea, vomiting, weakness. Appears well. Has follow up appt with [facility] this coming Wednesday. Via  Nomi # [id number]:  32 y/o F with h/o DM, HTN 6 weeks pregnant  seen here earlier today with vaginal bleeding returns with increased vaginal bleeding, abdominal discomfort, nausea and "passing tissue" when she went home. Denies fever, chills, nausea, vomiting, weakness. Appears well. Has follow up appt with [facility] this coming Wednesday.

## 2018-12-01 NOTE — ED PROVIDER NOTE - NSFOLLOWUPINSTRUCTIONS_ED_ALL_ED_FT
Follow up with your OBGYN ([facility]) as scheduled this Wednesday. Rest, increase your fluids, no heavy lifting, no sexual intercourse. Worsening, continued or ANY new concerning symptoms return to the emergency department.

## 2018-12-03 LAB
CULTURE RESULTS: SIGNIFICANT CHANGE UP
SPECIMEN SOURCE: SIGNIFICANT CHANGE UP

## 2018-12-05 ENCOUNTER — APPOINTMENT (OUTPATIENT)
Dept: OBGYN | Facility: CLINIC | Age: 33
End: 2018-12-05

## 2019-04-26 NOTE — ED ADULT TRIAGE NOTE - NS ED NURSE BANDS TYPE
Spoke with mother.  Will try 15 mg ( 3 tablets ) Saturday, Sunday, Monday.  Mother will then My Chart on Tuesday.  Given new script as we have been increasing the dose.   Name band;

## 2019-07-08 ENCOUNTER — EMERGENCY (EMERGENCY)
Facility: HOSPITAL | Age: 34
LOS: 1 days | Discharge: HOME | End: 2019-07-08
Attending: EMERGENCY MEDICINE | Admitting: EMERGENCY MEDICINE
Payer: COMMERCIAL

## 2019-07-08 VITALS
HEART RATE: 82 BPM | RESPIRATION RATE: 18 BRPM | OXYGEN SATURATION: 98 % | TEMPERATURE: 98 F | DIASTOLIC BLOOD PRESSURE: 73 MMHG | SYSTOLIC BLOOD PRESSURE: 108 MMHG

## 2019-07-08 VITALS
WEIGHT: 169.98 LBS | HEIGHT: 62 IN | RESPIRATION RATE: 18 BRPM | SYSTOLIC BLOOD PRESSURE: 154 MMHG | DIASTOLIC BLOOD PRESSURE: 87 MMHG | TEMPERATURE: 98 F | OXYGEN SATURATION: 99 % | HEART RATE: 81 BPM

## 2019-07-08 LAB
ANION GAP SERPL CALC-SCNC: 8 MMOL/L — SIGNIFICANT CHANGE UP (ref 5–17)
BASOPHILS # BLD AUTO: 0.04 K/UL — SIGNIFICANT CHANGE UP (ref 0–0.2)
BASOPHILS NFR BLD AUTO: 0.4 % — SIGNIFICANT CHANGE UP (ref 0–2)
BUN SERPL-MCNC: 7 MG/DL — SIGNIFICANT CHANGE UP (ref 7–23)
CALCIUM SERPL-MCNC: 9.1 MG/DL — SIGNIFICANT CHANGE UP (ref 8.4–10.5)
CHLORIDE SERPL-SCNC: 102 MMOL/L — SIGNIFICANT CHANGE UP (ref 96–108)
CO2 SERPL-SCNC: 28 MMOL/L — SIGNIFICANT CHANGE UP (ref 22–31)
CREAT SERPL-MCNC: 0.78 MG/DL — SIGNIFICANT CHANGE UP (ref 0.5–1.3)
EOSINOPHIL # BLD AUTO: 0.15 K/UL — SIGNIFICANT CHANGE UP (ref 0–0.5)
EOSINOPHIL NFR BLD AUTO: 1.5 % — SIGNIFICANT CHANGE UP (ref 0–6)
GLUCOSE SERPL-MCNC: 294 MG/DL — HIGH (ref 70–99)
HCT VFR BLD CALC: 39.6 % — SIGNIFICANT CHANGE UP (ref 34.5–45)
HGB BLD-MCNC: 13.1 G/DL — SIGNIFICANT CHANGE UP (ref 11.5–15.5)
IMM GRANULOCYTES NFR BLD AUTO: 0.3 % — SIGNIFICANT CHANGE UP (ref 0–1.5)
LYMPHOCYTES # BLD AUTO: 3.23 K/UL — SIGNIFICANT CHANGE UP (ref 1–3.3)
LYMPHOCYTES # BLD AUTO: 33.3 % — SIGNIFICANT CHANGE UP (ref 13–44)
MCHC RBC-ENTMCNC: 28.4 PG — SIGNIFICANT CHANGE UP (ref 27–34)
MCHC RBC-ENTMCNC: 33.1 GM/DL — SIGNIFICANT CHANGE UP (ref 32–36)
MCV RBC AUTO: 85.9 FL — SIGNIFICANT CHANGE UP (ref 80–100)
MONOCYTES # BLD AUTO: 0.79 K/UL — SIGNIFICANT CHANGE UP (ref 0–0.9)
MONOCYTES NFR BLD AUTO: 8.2 % — SIGNIFICANT CHANGE UP (ref 2–14)
NEUTROPHILS # BLD AUTO: 5.45 K/UL — SIGNIFICANT CHANGE UP (ref 1.8–7.4)
NEUTROPHILS NFR BLD AUTO: 56.3 % — SIGNIFICANT CHANGE UP (ref 43–77)
NRBC # BLD: 0 /100 WBCS — SIGNIFICANT CHANGE UP (ref 0–0)
PLATELET # BLD AUTO: 286 K/UL — SIGNIFICANT CHANGE UP (ref 150–400)
POTASSIUM SERPL-MCNC: 4.1 MMOL/L — SIGNIFICANT CHANGE UP (ref 3.5–5.3)
POTASSIUM SERPL-SCNC: 4.1 MMOL/L — SIGNIFICANT CHANGE UP (ref 3.5–5.3)
RBC # BLD: 4.61 M/UL — SIGNIFICANT CHANGE UP (ref 3.8–5.2)
RBC # FLD: 12.6 % — SIGNIFICANT CHANGE UP (ref 10.3–14.5)
SODIUM SERPL-SCNC: 138 MMOL/L — SIGNIFICANT CHANGE UP (ref 135–145)
WBC # BLD: 9.69 K/UL — SIGNIFICANT CHANGE UP (ref 3.8–10.5)
WBC # FLD AUTO: 9.69 K/UL — SIGNIFICANT CHANGE UP (ref 3.8–10.5)

## 2019-07-08 PROCEDURE — 36415 COLL VENOUS BLD VENIPUNCTURE: CPT

## 2019-07-08 PROCEDURE — 80048 BASIC METABOLIC PNL TOTAL CA: CPT

## 2019-07-08 PROCEDURE — 96361 HYDRATE IV INFUSION ADD-ON: CPT

## 2019-07-08 PROCEDURE — 96374 THER/PROPH/DIAG INJ IV PUSH: CPT

## 2019-07-08 PROCEDURE — 85027 COMPLETE CBC AUTOMATED: CPT

## 2019-07-08 PROCEDURE — 70450 CT HEAD/BRAIN W/O DYE: CPT | Mod: 26

## 2019-07-08 PROCEDURE — 99284 EMERGENCY DEPT VISIT MOD MDM: CPT

## 2019-07-08 PROCEDURE — 99284 EMERGENCY DEPT VISIT MOD MDM: CPT | Mod: 25

## 2019-07-08 PROCEDURE — 70450 CT HEAD/BRAIN W/O DYE: CPT

## 2019-07-08 PROCEDURE — 96375 TX/PRO/DX INJ NEW DRUG ADDON: CPT

## 2019-07-08 RX ORDER — IBUPROFEN 200 MG
1 TABLET ORAL
Qty: 30 | Refills: 0
Start: 2019-07-08

## 2019-07-08 RX ORDER — METOCLOPRAMIDE HCL 10 MG
10 TABLET ORAL ONCE
Refills: 0 | Status: COMPLETED | OUTPATIENT
Start: 2019-07-08 | End: 2019-07-08

## 2019-07-08 RX ORDER — AMOXICILLIN 250 MG/5ML
1 SUSPENSION, RECONSTITUTED, ORAL (ML) ORAL
Qty: 21 | Refills: 0
Start: 2019-07-08 | End: 2019-07-14

## 2019-07-08 RX ORDER — SODIUM CHLORIDE 9 MG/ML
1000 INJECTION INTRAMUSCULAR; INTRAVENOUS; SUBCUTANEOUS ONCE
Refills: 0 | Status: COMPLETED | OUTPATIENT
Start: 2019-07-08 | End: 2019-07-08

## 2019-07-08 RX ORDER — KETOROLAC TROMETHAMINE 30 MG/ML
30 SYRINGE (ML) INJECTION ONCE
Refills: 0 | Status: DISCONTINUED | OUTPATIENT
Start: 2019-07-08 | End: 2019-07-08

## 2019-07-08 RX ORDER — AMOXICILLIN 250 MG/5ML
500 SUSPENSION, RECONSTITUTED, ORAL (ML) ORAL ONCE
Refills: 0 | Status: COMPLETED | OUTPATIENT
Start: 2019-07-08 | End: 2019-07-08

## 2019-07-08 RX ORDER — DIPHENHYDRAMINE HCL 50 MG
25 CAPSULE ORAL ONCE
Refills: 0 | Status: COMPLETED | OUTPATIENT
Start: 2019-07-08 | End: 2019-07-08

## 2019-07-08 RX ADMIN — Medication 500 MILLIGRAM(S): at 03:34

## 2019-07-08 RX ADMIN — SODIUM CHLORIDE 1000 MILLILITER(S): 9 INJECTION INTRAMUSCULAR; INTRAVENOUS; SUBCUTANEOUS at 04:20

## 2019-07-08 RX ADMIN — Medication 30 MILLIGRAM(S): at 03:51

## 2019-07-08 RX ADMIN — Medication 10 MILLIGRAM(S): at 03:22

## 2019-07-08 RX ADMIN — Medication 30 MILLIGRAM(S): at 03:21

## 2019-07-08 RX ADMIN — SODIUM CHLORIDE 1000 MILLILITER(S): 9 INJECTION INTRAMUSCULAR; INTRAVENOUS; SUBCUTANEOUS at 03:20

## 2019-07-08 RX ADMIN — Medication 25 MILLIGRAM(S): at 03:23

## 2019-07-08 NOTE — ED ADULT NURSE REASSESSMENT NOTE - NS ED NURSE REASSESS COMMENT FT1
came in ambulatory aox3 c/o pain @ right side of head, evaluated by md tyson ,meds given as ordered.

## 2019-07-08 NOTE — ED PROVIDER NOTE - ENMT, MLM
Airway patent, Nasal mucosa clear. Mouth with normal mucosa. Throat has no vesicles, no oropharyngeal exudates and uvula is midline.  mod focal ttp R upper incisor. tooth intact. no gum swelling.  no facial swelling/tenderness

## 2019-07-08 NOTE — ED PROVIDER NOTE - OBJECTIVE STATEMENT
Pt c/o severe sharp headache, R side of head, starting 4 d ago, gradual onset radiates to R upper teeth. no trauma. no nausea/vmoting. tonight with R arm numbness since midnight, no weakness. no speech/vision change.  has suffered from migraines for many years and has had numbness of RUE in past related to migraines.  no fever, neck pain

## 2019-07-08 NOTE — ED PROVIDER NOTE - CLINICAL SUMMARY MEDICAL DECISION MAKING FREE TEXT BOX
R side headache c RUE numbness, similar to prior migraine episodes. likely migraine related. less liekly stroke. possible dental infx. will give iv analgesics. check labs,/lytes, ct head.

## 2019-07-08 NOTE — ED PROVIDER NOTE - CARE PLAN
Principal Discharge DX:	Migraine without status migrainosus, not intractable, unspecified migraine type  Secondary Diagnosis:	Toothache

## 2019-07-08 NOTE — ED ADULT NURSE NOTE - NSSUHOSCREENINGYN_ED_ALL_ED
Pt called and informed of results. Pt reports feeling better. Pt to continue abx through completion. All questions answered. Yes - the patient is able to be screened

## 2019-07-08 NOTE — ED PROVIDER NOTE - PROGRESS NOTE DETAILS
pt states she feels significantly better. well appearing. has no numbness now.  RUE normal gross sensation vs left.

## 2019-12-02 ENCOUNTER — EMERGENCY (EMERGENCY)
Facility: HOSPITAL | Age: 34
LOS: 1 days | Discharge: HOME | End: 2019-12-02
Attending: EMERGENCY MEDICINE | Admitting: EMERGENCY MEDICINE
Payer: COMMERCIAL

## 2019-12-02 VITALS
OXYGEN SATURATION: 98 % | HEART RATE: 100 BPM | WEIGHT: 169.98 LBS | HEIGHT: 64 IN | SYSTOLIC BLOOD PRESSURE: 124 MMHG | DIASTOLIC BLOOD PRESSURE: 91 MMHG | RESPIRATION RATE: 16 BRPM

## 2019-12-02 VITALS — TEMPERATURE: 98 F

## 2019-12-02 LAB
ALBUMIN SERPL ELPH-MCNC: 4.1 G/DL — SIGNIFICANT CHANGE UP (ref 3.3–5)
ALP SERPL-CCNC: 131 U/L — HIGH (ref 40–120)
ALT FLD-CCNC: 22 U/L — SIGNIFICANT CHANGE UP (ref 10–45)
ANION GAP SERPL CALC-SCNC: 12 MMOL/L — SIGNIFICANT CHANGE UP (ref 5–17)
APPEARANCE UR: CLEAR — SIGNIFICANT CHANGE UP
AST SERPL-CCNC: 26 U/L — SIGNIFICANT CHANGE UP (ref 10–40)
BASOPHILS # BLD AUTO: 0.02 K/UL — SIGNIFICANT CHANGE UP (ref 0–0.2)
BASOPHILS NFR BLD AUTO: 0.3 % — SIGNIFICANT CHANGE UP (ref 0–2)
BILIRUB SERPL-MCNC: 0.3 MG/DL — SIGNIFICANT CHANGE UP (ref 0.2–1.2)
BILIRUB UR-MCNC: NEGATIVE — SIGNIFICANT CHANGE UP
BUN SERPL-MCNC: 8 MG/DL — SIGNIFICANT CHANGE UP (ref 7–23)
CALCIUM SERPL-MCNC: 8.8 MG/DL — SIGNIFICANT CHANGE UP (ref 8.4–10.5)
CHLORIDE SERPL-SCNC: 100 MMOL/L — SIGNIFICANT CHANGE UP (ref 96–108)
CO2 SERPL-SCNC: 23 MMOL/L — SIGNIFICANT CHANGE UP (ref 22–31)
COLOR SPEC: YELLOW — SIGNIFICANT CHANGE UP
CREAT SERPL-MCNC: 0.65 MG/DL — SIGNIFICANT CHANGE UP (ref 0.5–1.3)
DIFF PNL FLD: NEGATIVE — SIGNIFICANT CHANGE UP
EOSINOPHIL # BLD AUTO: 0.11 K/UL — SIGNIFICANT CHANGE UP (ref 0–0.5)
EOSINOPHIL NFR BLD AUTO: 1.4 % — SIGNIFICANT CHANGE UP (ref 0–6)
GLUCOSE SERPL-MCNC: 263 MG/DL — HIGH (ref 70–99)
GLUCOSE UR QL: 1000 MG/DL
HCT VFR BLD CALC: 43.3 % — SIGNIFICANT CHANGE UP (ref 34.5–45)
HGB BLD-MCNC: 14.5 G/DL — SIGNIFICANT CHANGE UP (ref 11.5–15.5)
IMM GRANULOCYTES NFR BLD AUTO: 0.4 % — SIGNIFICANT CHANGE UP (ref 0–1.5)
KETONES UR-MCNC: NEGATIVE — SIGNIFICANT CHANGE UP
LEUKOCYTE ESTERASE UR-ACNC: NEGATIVE — SIGNIFICANT CHANGE UP
LYMPHOCYTES # BLD AUTO: 3.03 K/UL — SIGNIFICANT CHANGE UP (ref 1–3.3)
LYMPHOCYTES # BLD AUTO: 37.9 % — SIGNIFICANT CHANGE UP (ref 13–44)
MCHC RBC-ENTMCNC: 28.2 PG — SIGNIFICANT CHANGE UP (ref 27–34)
MCHC RBC-ENTMCNC: 33.5 GM/DL — SIGNIFICANT CHANGE UP (ref 32–36)
MCV RBC AUTO: 84.1 FL — SIGNIFICANT CHANGE UP (ref 80–100)
MONOCYTES # BLD AUTO: 0.72 K/UL — SIGNIFICANT CHANGE UP (ref 0–0.9)
MONOCYTES NFR BLD AUTO: 9 % — SIGNIFICANT CHANGE UP (ref 2–14)
NEUTROPHILS # BLD AUTO: 4.08 K/UL — SIGNIFICANT CHANGE UP (ref 1.8–7.4)
NEUTROPHILS NFR BLD AUTO: 51 % — SIGNIFICANT CHANGE UP (ref 43–77)
NITRITE UR-MCNC: POSITIVE
NRBC # BLD: 0 /100 WBCS — SIGNIFICANT CHANGE UP (ref 0–0)
PH UR: 6.5 — SIGNIFICANT CHANGE UP (ref 5–8)
PLATELET # BLD AUTO: 270 K/UL — SIGNIFICANT CHANGE UP (ref 150–400)
POTASSIUM SERPL-MCNC: 4.1 MMOL/L — SIGNIFICANT CHANGE UP (ref 3.5–5.3)
POTASSIUM SERPL-SCNC: 4.1 MMOL/L — SIGNIFICANT CHANGE UP (ref 3.5–5.3)
PROT SERPL-MCNC: 7.8 G/DL — SIGNIFICANT CHANGE UP (ref 6–8.3)
PROT UR-MCNC: 100
RBC # BLD: 5.15 M/UL — SIGNIFICANT CHANGE UP (ref 3.8–5.2)
RBC # FLD: 12.4 % — SIGNIFICANT CHANGE UP (ref 10.3–14.5)
SODIUM SERPL-SCNC: 135 MMOL/L — SIGNIFICANT CHANGE UP (ref 135–145)
SP GR SPEC: 1.01 — SIGNIFICANT CHANGE UP (ref 1.01–1.02)
UROBILINOGEN FLD QL: NEGATIVE — SIGNIFICANT CHANGE UP
WBC # BLD: 7.99 K/UL — SIGNIFICANT CHANGE UP (ref 3.8–10.5)
WBC # FLD AUTO: 7.99 K/UL — SIGNIFICANT CHANGE UP (ref 3.8–10.5)

## 2019-12-02 PROCEDURE — 36415 COLL VENOUS BLD VENIPUNCTURE: CPT

## 2019-12-02 PROCEDURE — 70496 CT ANGIOGRAPHY HEAD: CPT

## 2019-12-02 PROCEDURE — 70450 CT HEAD/BRAIN W/O DYE: CPT | Mod: 26

## 2019-12-02 PROCEDURE — 99284 EMERGENCY DEPT VISIT MOD MDM: CPT

## 2019-12-02 PROCEDURE — 80053 COMPREHEN METABOLIC PANEL: CPT

## 2019-12-02 PROCEDURE — 96374 THER/PROPH/DIAG INJ IV PUSH: CPT | Mod: XU

## 2019-12-02 PROCEDURE — 70498 CT ANGIOGRAPHY NECK: CPT | Mod: 26

## 2019-12-02 PROCEDURE — 81001 URINALYSIS AUTO W/SCOPE: CPT

## 2019-12-02 PROCEDURE — 81025 URINE PREGNANCY TEST: CPT

## 2019-12-02 PROCEDURE — 70496 CT ANGIOGRAPHY HEAD: CPT | Mod: 26

## 2019-12-02 PROCEDURE — 96361 HYDRATE IV INFUSION ADD-ON: CPT

## 2019-12-02 PROCEDURE — 70450 CT HEAD/BRAIN W/O DYE: CPT

## 2019-12-02 PROCEDURE — 85027 COMPLETE CBC AUTOMATED: CPT

## 2019-12-02 PROCEDURE — 70498 CT ANGIOGRAPHY NECK: CPT

## 2019-12-02 PROCEDURE — 99284 EMERGENCY DEPT VISIT MOD MDM: CPT | Mod: 25

## 2019-12-02 RX ORDER — DIAZEPAM 5 MG
5 TABLET ORAL ONCE
Refills: 0 | Status: DISCONTINUED | OUTPATIENT
Start: 2019-12-02 | End: 2019-12-02

## 2019-12-02 RX ORDER — SODIUM CHLORIDE 9 MG/ML
1000 INJECTION INTRAMUSCULAR; INTRAVENOUS; SUBCUTANEOUS ONCE
Refills: 0 | Status: COMPLETED | OUTPATIENT
Start: 2019-12-02 | End: 2019-12-02

## 2019-12-02 RX ORDER — ACETAMINOPHEN 500 MG
650 TABLET ORAL ONCE
Refills: 0 | Status: COMPLETED | OUTPATIENT
Start: 2019-12-02 | End: 2019-12-02

## 2019-12-02 RX ORDER — METOCLOPRAMIDE HCL 10 MG
10 TABLET ORAL ONCE
Refills: 0 | Status: COMPLETED | OUTPATIENT
Start: 2019-12-02 | End: 2019-12-02

## 2019-12-02 RX ADMIN — Medication 5 MILLIGRAM(S): at 15:42

## 2019-12-02 RX ADMIN — SODIUM CHLORIDE 1000 MILLILITER(S): 9 INJECTION INTRAMUSCULAR; INTRAVENOUS; SUBCUTANEOUS at 15:42

## 2019-12-02 RX ADMIN — Medication 10 MILLIGRAM(S): at 15:42

## 2019-12-02 RX ADMIN — Medication 650 MILLIGRAM(S): at 15:42

## 2019-12-02 RX ADMIN — Medication 650 MILLIGRAM(S): at 16:30

## 2019-12-02 NOTE — ED PROVIDER NOTE - PROGRESS NOTE DETAILS
PA Baseil: Pt CT & CTA negative. Labs WNL. Pt now resting comfortably, ha dissipated, no complaints. Will dc home with PMD and neuro f/u, close return precautions. Pt and family amenable to plan and stable for dc.

## 2019-12-02 NOTE — ED PROVIDER NOTE - NEUROLOGICAL NECK
L paraspinal cervical tenderness- no VPT, no step offs, no deformities/discoloration. FROM of neck/back/TENDER

## 2019-12-02 NOTE — ED PROVIDER NOTE - NSFOLLOWUPINSTRUCTIONS_ED_ALL_ED_FT
Rest, drink plenty of fluids  Advance activity as tolerated  Continue all previously prescribed medications as directed  Take Tylenol 650mg every 4-6 hours as needed for pain.  Follow up with your PMD 2-3 days- bring copies of your results  Follow up with neurology  Return to the ER for worsening

## 2019-12-02 NOTE — ED PROVIDER NOTE - CLINICAL SUMMARY MEDICAL DECISION MAKING FREE TEXT BOX
Dr. Sharpe: 34F h/o DM, headache, p/w acute onset left sided HA x 10 mins radiating down to left neck. No nausea, vomiting, chest pain, sob, abdo pain. No fam h/o aneursysms. Has had HAs before but this feels different. On exam pt is crying loudly holding left side of neck, NIHSS 0, rrr, ctab, abdo soft/nt/nd. Pt taken immediately to CT - Ct non con negative, SAH ruled out given acute onset of sx < 6h, will get CTA head/neck and reassess r/o dissection.

## 2019-12-02 NOTE — ED ADULT NURSE NOTE - OBJECTIVE STATEMENT
Bebeto ID #[id number] utilized to obtain history. Pt reports onset of sudden, severe left sided headache ten minutes prior to arrival to ED. Denies nausea/vomiting. Denies chest pain, shortness of breath.

## 2019-12-02 NOTE — ED PROVIDER NOTE - ATTENDING CONTRIBUTION TO CARE
Dr. Sharpe: I performed a face to face bedside interview with patient regarding history of present illness, review of symptoms and past medical history. I completed an independent physical exam.  I have discussed patient's plan of care with PA.   I agree with note as stated above, having amended the EMR as needed to reflect my findings.   This includes HISTORY OF PRESENT ILLNESS, HIV, PAST MEDICAL/SURGICAL/FAMILY/SOCIAL HISTORY, ALLERGIES AND HOME MEDICATIONS, REVIEW OF SYSTEMS, PHYSICAL EXAM, and any PROGRESS NOTES during the time I functioned as the attending physician for this patient.    see mdm

## 2019-12-02 NOTE — ED PROVIDER NOTE - OBJECTIVE STATEMENT
34 year old female, PMhx of diabetes (not on medications); presents to the ED complaining of headache. Patient states she was sitting in the car with her  and daughter and had sudden onset headache, worse on the left side, radiating into her neck and diffuse head. She states it occurred approximately 10 minutes PTA. She endorses history of headaches in the past but states this one is worse than her normal, has no accompanying symptoms, exacerbating or eliminating factors. She was feeling well prior to this. She denies f/c/n/v/d, trauma/injury, numbness, tingling, weakness, dizziness, changes in vision/hearing, chest pain, sob, or other complaints.

## 2019-12-02 NOTE — ED ADULT NURSE REASSESSMENT NOTE - NS ED NURSE REASSESS COMMENT FT1
Utilized  Jenny #[id number] to explain prescribed medication to treat headache, patient expressed understanding of use.

## 2019-12-02 NOTE — ED PROVIDER NOTE - CHPI ED SYMPTOMS NEG
no numbness/no change in level of consciousness/no weakness/no fever/no loss of consciousness/no nausea/no blurred vision/no dizziness/no confusion/no vomiting

## 2019-12-02 NOTE — ED PROVIDER NOTE - PATIENT PORTAL LINK FT
You can access the FollowMyHealth Patient Portal offered by [facility] by registering at the following website: [email or web address]. By joining [employer]’s FollowMyHealth portal, you will also be able to view your health information using other applications (apps) compatible with our system.

## 2019-12-02 NOTE — ED ADULT TRIAGE NOTE - CHIEF COMPLAINT QUOTE
c/o headache starting about 10 minutes ago as per  c/o headache starting about 10 minutes ago while in the car with her  and daughter as per

## 2019-12-07 DIAGNOSIS — R51 HEADACHE: ICD-10-CM

## 2020-01-08 ENCOUNTER — OUTPATIENT (OUTPATIENT)
Dept: OUTPATIENT SERVICES | Facility: HOSPITAL | Age: 35
LOS: 1 days | End: 2020-01-08
Payer: MEDICAID

## 2020-01-08 ENCOUNTER — APPOINTMENT (OUTPATIENT)
Dept: MAMMOGRAPHY | Facility: HOSPITAL | Age: 35
End: 2020-01-08
Payer: MEDICAID

## 2020-01-08 ENCOUNTER — APPOINTMENT (OUTPATIENT)
Dept: ULTRASOUND IMAGING | Facility: HOSPITAL | Age: 35
End: 2020-01-08
Payer: MEDICAID

## 2020-01-08 DIAGNOSIS — Z00.8 ENCOUNTER FOR OTHER GENERAL EXAMINATION: ICD-10-CM

## 2020-01-08 DIAGNOSIS — N64.4 MASTODYNIA: ICD-10-CM

## 2020-01-08 PROCEDURE — 77066 DX MAMMO INCL CAD BI: CPT | Mod: 26

## 2020-01-08 PROCEDURE — 76641 ULTRASOUND BREAST COMPLETE: CPT | Mod: 26,50

## 2020-01-08 PROCEDURE — 77062 BREAST TOMOSYNTHESIS BI: CPT | Mod: 26

## 2020-01-08 PROCEDURE — 76641 ULTRASOUND BREAST COMPLETE: CPT

## 2020-01-08 PROCEDURE — G0279: CPT

## 2020-01-08 PROCEDURE — 77066 DX MAMMO INCL CAD BI: CPT

## 2020-04-01 ENCOUNTER — EMERGENCY (EMERGENCY)
Facility: HOSPITAL | Age: 35
LOS: 1 days | Discharge: HOME | End: 2020-04-01
Attending: EMERGENCY MEDICINE | Admitting: EMERGENCY MEDICINE
Payer: MEDICAID

## 2020-04-01 VITALS
HEIGHT: 61.42 IN | SYSTOLIC BLOOD PRESSURE: 152 MMHG | WEIGHT: 160.06 LBS | HEART RATE: 110 BPM | TEMPERATURE: 98 F | RESPIRATION RATE: 22 BRPM | OXYGEN SATURATION: 100 % | DIASTOLIC BLOOD PRESSURE: 96 MMHG

## 2020-04-01 DIAGNOSIS — R06.02 SHORTNESS OF BREATH: ICD-10-CM

## 2020-04-01 PROCEDURE — 93010 ELECTROCARDIOGRAM REPORT: CPT

## 2020-04-01 PROCEDURE — 93005 ELECTROCARDIOGRAM TRACING: CPT

## 2020-04-01 PROCEDURE — 99284 EMERGENCY DEPT VISIT MOD MDM: CPT

## 2020-04-01 PROCEDURE — 99283 EMERGENCY DEPT VISIT LOW MDM: CPT

## 2020-04-01 NOTE — ED ADULT NURSE NOTE - OBJECTIVE STATEMENT
Pt arrives to ER ambulatory from home c/o shortness of breath that began just prior to arrival. Pt also reports having chest pain over the last day, pain 8/10. Pt denies fever or cough, reports diarrhea and nausea and loss of smell. Pt states she lives with her mom who was recently tested for covid but did not receive results.

## 2020-04-01 NOTE — ED PROVIDER NOTE - OBJECTIVE STATEMENT
33 y/o F with h/o type2 DM. P/w c/o SOB and cough and fever for one day. pt has other family member with similar symptoms and are tested for COVID. pt also c/o loss of smell.

## 2020-04-01 NOTE — ED PROVIDER NOTE - CLINICAL SUMMARY MEDICAL DECISION MAKING FREE TEXT BOX
young female with type 2 DM p/w c/o cough, soB abd fever and loss of sense of smell for one day. Pt other family member has similar symptoms and are tested for COVID 1. pt likely has symptoms due to COVID 19. advised rest, hydration, Tylenol for fever or pain and hand washing and self quarantine in house for 14 days.

## 2020-04-01 NOTE — ED PROVIDER NOTE - NSFOLLOWUPINSTRUCTIONS_ED_ALL_ED_FT
See attached for COVID-19 info / fact sheet.   Please stay home for 14 days. See work note and fact sheet.   Home quarantine is recommended to monitor symptoms.     Worsening, continued or ANY new concerning symptoms return to the emergency department.

## 2020-04-01 NOTE — ED PROVIDER NOTE - PATIENT PORTAL LINK FT
You can access the FollowMyHealth Patient Portal offered by [facility] by registering at the following website: [email or web address]. By joining [employer]’s FollowMyHealth portal, you will also be able to view your health information using other applications (apps) compatible with our system.

## 2020-09-04 ENCOUNTER — OUTPATIENT (OUTPATIENT)
Dept: OUTPATIENT SERVICES | Facility: HOSPITAL | Age: 35
LOS: 1 days | End: 2020-09-04
Payer: MEDICAID

## 2020-09-04 ENCOUNTER — APPOINTMENT (OUTPATIENT)
Dept: RADIOLOGY | Facility: HOSPITAL | Age: 35
End: 2020-09-04
Payer: COMMERCIAL

## 2020-09-04 DIAGNOSIS — R76.11 NONSPECIFIC REACTION TO TUBERCULIN SKIN TEST WITHOUT ACTIVE TUBERCULOSIS: ICD-10-CM

## 2020-09-04 PROCEDURE — 71046 X-RAY EXAM CHEST 2 VIEWS: CPT

## 2020-09-04 PROCEDURE — 71046 X-RAY EXAM CHEST 2 VIEWS: CPT | Mod: 26

## 2020-12-12 NOTE — ED PROVIDER NOTE - PROGRESS NOTE
TRANSFER IN - DIALYSIS    Received patient in dialysis unit  from [facility] (unit) for ordered procedure. Consent verified for renal replacement therapy. Patient alert and vital signs stable. /93 P 94    Hemodialysis initiated using Right CVC. Aspirated and flushed both ports without difficulty. Dressing clean, dry and intact. Machine settings per MD order. Heparin 2000 unit bolus and 500 units/hr. Will monitor during treatment. Statement Selected Improved.

## 2020-12-16 PROBLEM — Z86.19 HISTORY OF CANDIDIASIS OF VAGINA: Status: RESOLVED | Noted: 2018-05-17 | Resolved: 2020-12-16

## 2020-12-16 PROBLEM — N76.0 BACTERIAL VAGINOSIS: Status: RESOLVED | Noted: 2018-05-17 | Resolved: 2020-12-16

## 2021-02-22 ENCOUNTER — APPOINTMENT (OUTPATIENT)
Dept: OBGYN | Facility: CLINIC | Age: 36
End: 2021-02-22

## 2021-02-22 NOTE — REVIEW OF SYSTEMS
[Chills] : chills [Fatigue] : fatigue [Heartburn] : heartburn [Headache] : headache [Negative] : Heme/Lymph

## 2021-02-25 NOTE — ED PROVIDER NOTE - TIMING
Interpretor Erika [id number] was used to obtain consent. Vaccine Information Statement(s) was given today. This has been reviewed, questions answered, and verbal consent given by Patient for injection(s) and administration of Pneumococcal Polysaccharide (PPSV).    Patient tolerated without incident. See immunization grid for documentation.         sudden onset

## 2021-02-26 ENCOUNTER — RESULT REVIEW (OUTPATIENT)
Age: 36
End: 2021-02-26

## 2021-02-26 ENCOUNTER — APPOINTMENT (OUTPATIENT)
Dept: FAMILY MEDICINE | Facility: HOSPITAL | Age: 36
End: 2021-02-26
Payer: COMMERCIAL

## 2021-02-26 ENCOUNTER — OUTPATIENT (OUTPATIENT)
Dept: OUTPATIENT SERVICES | Facility: HOSPITAL | Age: 36
LOS: 1 days | End: 2021-02-26
Payer: MEDICAID

## 2021-02-26 ENCOUNTER — RESULT CHARGE (OUTPATIENT)
Age: 36
End: 2021-02-26

## 2021-02-26 DIAGNOSIS — Z87.42 PERSONAL HISTORY OF OTHER DISEASES OF THE FEMALE GENITAL TRACT: ICD-10-CM

## 2021-02-26 DIAGNOSIS — L53.9 ERYTHEMATOUS CONDITION, UNSPECIFIED: ICD-10-CM

## 2021-02-26 DIAGNOSIS — Z34.01 ENCOUNTER FOR SUPERVISION OF NORMAL FIRST PREGNANCY, FIRST TRIMESTER: ICD-10-CM

## 2021-02-26 DIAGNOSIS — Z87.59 PERSONAL HISTORY OF OTHER COMPLICATIONS OF PREGNANCY, CHILDBIRTH AND THE PUERPERIUM: ICD-10-CM

## 2021-02-26 DIAGNOSIS — R10.2 PELVIC AND PERINEAL PAIN: ICD-10-CM

## 2021-02-26 DIAGNOSIS — Z32.01 ENCOUNTER FOR PREGNANCY TEST, RESULT POSITIVE: ICD-10-CM

## 2021-02-26 LAB
HCG UR QL: POSITIVE
QUALITY CONTROL: YES

## 2021-02-26 PROCEDURE — 81025 URINE PREGNANCY TEST: CPT

## 2021-02-26 PROCEDURE — 76817 TRANSVAGINAL US OBSTETRIC: CPT

## 2021-02-26 PROCEDURE — 76817 TRANSVAGINAL US OBSTETRIC: CPT | Mod: 26

## 2021-02-26 PROCEDURE — G0463: CPT

## 2021-02-26 PROCEDURE — 76801 OB US < 14 WKS SINGLE FETUS: CPT

## 2021-02-26 PROCEDURE — 76801 OB US < 14 WKS SINGLE FETUS: CPT | Mod: 26

## 2021-02-28 NOTE — HISTORY OF PRESENT ILLNESS
[[employer] ] : provided by [employer]   [FreeTextEntry8] : 35f  lmp  presenting for positive pregnancy test. States she was trying to make an appointment but was unable to do so. In December pt had some nausea and vomiting that has improved, - vaginal bleeding - contraction. Has not been taking prenatal vitamins.\par \par 2016  because pt had flu and "the girl was having too much palpitations on the heart" born at [facility], no other complications during this pregnancy born at 40wks\par 2019- [FreeTextEntry1] : [id number] [FreeTextEntry2] : Willi [TWNoteComboBox1] : [place]

## 2021-03-02 DIAGNOSIS — Z32.01 ENCOUNTER FOR PREGNANCY TEST, RESULT POSITIVE: ICD-10-CM

## 2021-03-02 DIAGNOSIS — O09.511 SUPERVISION OF ELDERLY PRIMIGRAVIDA, FIRST TRIMESTER: ICD-10-CM

## 2021-03-03 ENCOUNTER — NON-APPOINTMENT (OUTPATIENT)
Age: 36
End: 2021-03-03

## 2021-03-19 ENCOUNTER — NON-APPOINTMENT (OUTPATIENT)
Age: 36
End: 2021-03-19

## 2021-03-19 ENCOUNTER — RESULT CHARGE (OUTPATIENT)
Age: 36
End: 2021-03-19

## 2021-03-19 ENCOUNTER — APPOINTMENT (OUTPATIENT)
Dept: FAMILY MEDICINE | Facility: HOSPITAL | Age: 36
End: 2021-03-19

## 2021-03-19 ENCOUNTER — OUTPATIENT (OUTPATIENT)
Dept: OUTPATIENT SERVICES | Facility: HOSPITAL | Age: 36
LOS: 1 days | End: 2021-03-19
Payer: MEDICAID

## 2021-03-19 VITALS
OXYGEN SATURATION: 99 % | WEIGHT: 161 LBS | SYSTOLIC BLOOD PRESSURE: 112 MMHG | DIASTOLIC BLOOD PRESSURE: 70 MMHG | TEMPERATURE: 97.7 F | HEART RATE: 62 BPM | BODY MASS INDEX: 30.42 KG/M2 | RESPIRATION RATE: 14 BRPM

## 2021-03-19 DIAGNOSIS — Z23 ENCOUNTER FOR IMMUNIZATION: ICD-10-CM

## 2021-03-19 DIAGNOSIS — O09.511 SUPERVISION OF ELDERLY PRIMIGRAVIDA, FIRST TRIMESTER: ICD-10-CM

## 2021-03-19 DIAGNOSIS — Z34.01 ENCOUNTER FOR SUPERVISION OF NORMAL FIRST PREGNANCY, FIRST TRIMESTER: ICD-10-CM

## 2021-03-19 LAB — HBA1C MFR BLD HPLC: 7.2

## 2021-03-19 PROCEDURE — 86765 RUBEOLA ANTIBODY: CPT

## 2021-03-19 PROCEDURE — 87389 HIV-1 AG W/HIV-1&-2 AB AG IA: CPT

## 2021-03-19 PROCEDURE — 86901 BLOOD TYPING SEROLOGIC RH(D): CPT

## 2021-03-19 PROCEDURE — 83020 HEMOGLOBIN ELECTROPHORESIS: CPT

## 2021-03-19 PROCEDURE — 80307 DRUG TEST PRSMV CHEM ANLYZR: CPT

## 2021-03-19 PROCEDURE — 82950 GLUCOSE TEST: CPT

## 2021-03-19 PROCEDURE — 86769 SARS-COV-2 COVID-19 ANTIBODY: CPT

## 2021-03-19 PROCEDURE — 86900 BLOOD TYPING SEROLOGIC ABO: CPT

## 2021-03-19 PROCEDURE — 83655 ASSAY OF LEAD: CPT

## 2021-03-19 PROCEDURE — 81002 URINALYSIS NONAUTO W/O SCOPE: CPT

## 2021-03-19 PROCEDURE — 87340 HEPATITIS B SURFACE AG IA: CPT

## 2021-03-19 PROCEDURE — 86780 TREPONEMA PALLIDUM: CPT

## 2021-03-19 PROCEDURE — 81001 URINALYSIS AUTO W/SCOPE: CPT

## 2021-03-19 PROCEDURE — 86850 RBC ANTIBODY SCREEN: CPT

## 2021-03-19 PROCEDURE — 87624 HPV HI-RISK TYP POOLED RSLT: CPT

## 2021-03-19 PROCEDURE — 86762 RUBELLA ANTIBODY: CPT

## 2021-03-19 PROCEDURE — 87086 URINE CULTURE/COLONY COUNT: CPT

## 2021-03-19 PROCEDURE — G0463: CPT

## 2021-03-19 PROCEDURE — 86480 TB TEST CELL IMMUN MEASURE: CPT

## 2021-03-19 PROCEDURE — 85025 COMPLETE CBC W/AUTO DIFF WBC: CPT

## 2021-03-19 RX ORDER — HUMAN INSULIN 100 [IU]/ML
(70-30) 100 INJECTION, SUSPENSION SUBCUTANEOUS
Qty: 1 | Refills: 2 | Status: DISCONTINUED | COMMUNITY
Start: 2018-08-08 | End: 2021-03-19

## 2021-03-19 RX ORDER — FLUCONAZOLE 150 MG/1
150 TABLET ORAL
Qty: 1 | Refills: 0 | Status: DISCONTINUED | COMMUNITY
Start: 2018-05-17 | End: 2021-03-19

## 2021-03-19 RX ORDER — METRONIDAZOLE 500 MG/1
500 TABLET ORAL
Qty: 14 | Refills: 0 | Status: DISCONTINUED | COMMUNITY
Start: 2018-05-17 | End: 2021-03-19

## 2021-03-19 RX ORDER — CLOTRIMAZOLE AND BETAMETHASONE DIPROPIONATE 10; .5 MG/G; MG/G
1-0.05 CREAM TOPICAL TWICE DAILY
Qty: 1 | Refills: 0 | Status: DISCONTINUED | COMMUNITY
Start: 2018-05-10 | End: 2021-03-19

## 2021-03-20 ENCOUNTER — NON-APPOINTMENT (OUTPATIENT)
Age: 36
End: 2021-03-20

## 2021-03-23 DIAGNOSIS — Z34.90 ENCOUNTER FOR SUPERVISION OF NORMAL PREGNANCY, UNSPECIFIED, UNSPECIFIED TRIMESTER: ICD-10-CM

## 2021-03-24 ENCOUNTER — NON-APPOINTMENT (OUTPATIENT)
Age: 36
End: 2021-03-24

## 2021-03-24 LAB
ABO + RH PNL BLD: NORMAL
AMPHET UR-MCNC: NEGATIVE
APPEARANCE: CLEAR
BACTERIA UR CULT: NORMAL
BACTERIA: NEGATIVE
BARBITURATES UR-MCNC: NEGATIVE
BASOPHILS # BLD AUTO: 0.02 K/UL
BASOPHILS NFR BLD AUTO: 0.2 %
BENZODIAZ UR-MCNC: NEGATIVE
BILIRUBIN URINE: NEGATIVE
BLD GP AB SCN SERPL QL: NORMAL
BLOOD URINE: NEGATIVE
C TRACH RRNA SPEC QL NAA+PROBE: NOT DETECTED
COCAINE METAB.OTHER UR-MCNC: NEGATIVE
COLOR: NORMAL
COVID-19 NUCLEOCAPSID  GAM ANTIBODY INTERPRETATION: POSITIVE
CREATININE, URINE: 118.2 MG/DL
CYTOLOGY CVX/VAG DOC THIN PREP: ABNORMAL
EOSINOPHIL # BLD AUTO: 0.04 K/UL
EOSINOPHIL NFR BLD AUTO: 0.5 %
GLUCOSE 1H P 100 G GLC PO SERPL-MCNC: 172 MG/DL
GLUCOSE QUALITATIVE U: NEGATIVE
HBV SURFACE AG SER QL: NONREACTIVE
HCT VFR BLD CALC: 39.5 %
HGB A MFR BLD: 97.3 %
HGB A2 MFR BLD: 2.7 %
HGB BLD-MCNC: 12.7 G/DL
HGB FRACT BLD-IMP: NORMAL
HIV1+2 AB SPEC QL IA.RAPID: NONREACTIVE
HPV HIGH+LOW RISK DNA PNL CVX: NOT DETECTED
HYALINE CASTS: 2 /LPF
IMM GRANULOCYTES NFR BLD AUTO: 0.2 %
KETONES URINE: NEGATIVE
LEAD BLD-MCNC: <1 UG/DL
LEUKOCYTE ESTERASE URINE: ABNORMAL
LYMPHOCYTES # BLD AUTO: 2.16 K/UL
LYMPHOCYTES NFR BLD AUTO: 26.9 %
M TB IFN-G BLD-IMP: NEGATIVE
MAN DIFF?: NORMAL
MCHC RBC-ENTMCNC: 28.7 PG
MCHC RBC-ENTMCNC: 32.2 GM/DL
MCV RBC AUTO: 89.4 FL
METHADONE UR-MCNC: NEGATIVE
METHAQUALONE UR-MCNC: NEGATIVE
MEV IGG FLD QL IA: 34.9 AU/ML
MEV IGG+IGM SER-IMP: POSITIVE
MICROSCOPIC-UA: NORMAL
MONOCYTES # BLD AUTO: 0.43 K/UL
MONOCYTES NFR BLD AUTO: 5.4 %
N GONORRHOEA RRNA SPEC QL NAA+PROBE: NOT DETECTED
NEUTROPHILS # BLD AUTO: 5.36 K/UL
NEUTROPHILS NFR BLD AUTO: 66.8 %
NITRITE URINE: NEGATIVE
OPIATES UR-MCNC: NEGATIVE
PCP UR-MCNC: NEGATIVE
PH URINE: 7
PLATELET # BLD AUTO: 274 K/UL
PROPOXYPH UR QL: NEGATIVE
PROTEIN URINE: NORMAL
QUANTIFERON TB PLUS MITOGEN MINUS NIL: 9.43 IU/ML
QUANTIFERON TB PLUS NIL: 0.05 IU/ML
QUANTIFERON TB PLUS TB1 MINUS NIL: 0.06 IU/ML
QUANTIFERON TB PLUS TB2 MINUS NIL: 0.08 IU/ML
RBC # BLD: 4.42 M/UL
RBC # FLD: 13.2 %
RED BLOOD CELLS URINE: 2 /HPF
RUBV IGG FLD-ACNC: 5.8 INDEX
RUBV IGG SER-IMP: POSITIVE
SARS-COV-2 AB SERPL QL IA: 4.27 INDEX
SOURCE AMPLIFICATION: NORMAL
SPECIFIC GRAVITY URINE: 1.02
SQUAMOUS EPITHELIAL CELLS: 12 /HPF
T PALLIDUM AB SER QL IA: NEGATIVE
THC UR QL: NEGATIVE
UROBILINOGEN URINE: NORMAL
WBC # FLD AUTO: 8.03 K/UL
WHITE BLOOD CELLS URINE: 2 /HPF

## 2021-03-25 ENCOUNTER — NON-APPOINTMENT (OUTPATIENT)
Age: 36
End: 2021-03-25

## 2021-03-25 ENCOUNTER — APPOINTMENT (OUTPATIENT)
Dept: MATERNAL FETAL MEDICINE | Facility: CLINIC | Age: 36
End: 2021-03-25
Payer: COMMERCIAL

## 2021-03-25 PROCEDURE — G0108 DIAB MANAGE TRN  PER INDIV: CPT | Mod: 95

## 2021-03-30 ENCOUNTER — NON-APPOINTMENT (OUTPATIENT)
Age: 36
End: 2021-03-30

## 2021-04-02 ENCOUNTER — APPOINTMENT (OUTPATIENT)
Dept: MATERNAL FETAL MEDICINE | Facility: CLINIC | Age: 36
End: 2021-04-02
Payer: COMMERCIAL

## 2021-04-02 ENCOUNTER — LABORATORY RESULT (OUTPATIENT)
Age: 36
End: 2021-04-02

## 2021-04-02 ENCOUNTER — OUTPATIENT (OUTPATIENT)
Dept: OUTPATIENT SERVICES | Facility: HOSPITAL | Age: 36
LOS: 1 days | End: 2021-04-02
Payer: MEDICAID

## 2021-04-02 ENCOUNTER — APPOINTMENT (OUTPATIENT)
Dept: ANTEPARTUM | Facility: CLINIC | Age: 36
End: 2021-04-02

## 2021-04-02 ENCOUNTER — NON-APPOINTMENT (OUTPATIENT)
Age: 36
End: 2021-04-02

## 2021-04-02 ENCOUNTER — ASOB RESULT (OUTPATIENT)
Age: 36
End: 2021-04-02

## 2021-04-02 ENCOUNTER — APPOINTMENT (OUTPATIENT)
Dept: ANTEPARTUM | Facility: CLINIC | Age: 36
End: 2021-04-02
Payer: COMMERCIAL

## 2021-04-02 VITALS
HEART RATE: 76 BPM | HEIGHT: 61 IN | BODY MASS INDEX: 30.28 KG/M2 | OXYGEN SATURATION: 96 % | DIASTOLIC BLOOD PRESSURE: 70 MMHG | WEIGHT: 160.38 LBS | SYSTOLIC BLOOD PRESSURE: 118 MMHG

## 2021-04-02 DIAGNOSIS — O09.899 SUPERVISION OF OTHER HIGH RISK PREGNANCIES, UNSPECIFIED TRIMESTER: ICD-10-CM

## 2021-04-02 LAB
BILIRUB UR QL STRIP: NORMAL
GLUCOSE UR-MCNC: NORMAL
HCG UR QL: 0.2 EU/DL
HGB UR QL STRIP.AUTO: NORMAL
KETONES UR-MCNC: NORMAL
LEUKOCYTE ESTERASE UR QL STRIP: NORMAL
NITRITE UR QL STRIP: NORMAL
PH UR STRIP: 7.5
PROT UR STRIP-MCNC: NORMAL
SP GR UR STRIP: 1.01

## 2021-04-02 PROCEDURE — G0108: CPT

## 2021-04-02 PROCEDURE — 84702 CHORIONIC GONADOTROPIN TEST: CPT

## 2021-04-02 PROCEDURE — 85027 COMPLETE CBC AUTOMATED: CPT

## 2021-04-02 PROCEDURE — 84550 ASSAY OF BLOOD/URIC ACID: CPT

## 2021-04-02 PROCEDURE — 82105 ALPHA-FETOPROTEIN SERUM: CPT

## 2021-04-02 PROCEDURE — 36415 COLL VENOUS BLD VENIPUNCTURE: CPT

## 2021-04-02 PROCEDURE — G0108 DIAB MANAGE TRN  PER INDIV: CPT

## 2021-04-02 PROCEDURE — G0463: CPT

## 2021-04-02 PROCEDURE — 76805 OB US >/= 14 WKS SNGL FETUS: CPT

## 2021-04-02 PROCEDURE — 76805 OB US >/= 14 WKS SNGL FETUS: CPT | Mod: 26

## 2021-04-02 PROCEDURE — 83615 LACTATE (LD) (LDH) ENZYME: CPT

## 2021-04-02 PROCEDURE — 86336 INHIBIN A: CPT

## 2021-04-02 PROCEDURE — 99203 OFFICE O/P NEW LOW 30 MIN: CPT | Mod: GC,25

## 2021-04-02 PROCEDURE — 82677 ASSAY OF ESTRIOL: CPT

## 2021-04-02 PROCEDURE — 80053 COMPREHEN METABOLIC PANEL: CPT

## 2021-04-03 LAB
ALBUMIN SERPL ELPH-MCNC: 4.4 G/DL — SIGNIFICANT CHANGE UP (ref 3.3–5)
ALP SERPL-CCNC: 71 U/L — SIGNIFICANT CHANGE UP (ref 40–120)
ALT FLD-CCNC: 7 U/L — LOW (ref 10–45)
ANION GAP SERPL CALC-SCNC: 11 MMOL/L — SIGNIFICANT CHANGE UP (ref 5–17)
AST SERPL-CCNC: 11 U/L — SIGNIFICANT CHANGE UP (ref 10–40)
BILIRUB SERPL-MCNC: 0.4 MG/DL — SIGNIFICANT CHANGE UP (ref 0.2–1.2)
BUN SERPL-MCNC: 4 MG/DL — LOW (ref 7–23)
CALCIUM SERPL-MCNC: 9.6 MG/DL — SIGNIFICANT CHANGE UP (ref 8.4–10.5)
CHLORIDE SERPL-SCNC: 102 MMOL/L — SIGNIFICANT CHANGE UP (ref 96–108)
CO2 SERPL-SCNC: 25 MMOL/L — SIGNIFICANT CHANGE UP (ref 22–31)
CREAT SERPL-MCNC: 0.5 MG/DL — SIGNIFICANT CHANGE UP (ref 0.5–1.3)
GLUCOSE SERPL-MCNC: 120 MG/DL — HIGH (ref 70–99)
HCT VFR BLD CALC: 37.7 % — SIGNIFICANT CHANGE UP (ref 34.5–45)
HGB BLD-MCNC: 12.3 G/DL — SIGNIFICANT CHANGE UP (ref 11.5–15.5)
LDH SERPL L TO P-CCNC: 122 U/L — SIGNIFICANT CHANGE UP (ref 50–242)
MCHC RBC-ENTMCNC: 28.8 PG — SIGNIFICANT CHANGE UP (ref 27–34)
MCHC RBC-ENTMCNC: 32.6 GM/DL — SIGNIFICANT CHANGE UP (ref 32–36)
MCV RBC AUTO: 88.3 FL — SIGNIFICANT CHANGE UP (ref 80–100)
PLATELET # BLD AUTO: 264 K/UL — SIGNIFICANT CHANGE UP (ref 150–400)
POTASSIUM SERPL-MCNC: 4.7 MMOL/L — SIGNIFICANT CHANGE UP (ref 3.5–5.3)
POTASSIUM SERPL-SCNC: 4.7 MMOL/L — SIGNIFICANT CHANGE UP (ref 3.5–5.3)
PROT SERPL-MCNC: 6.8 G/DL — SIGNIFICANT CHANGE UP (ref 6–8.3)
RBC # BLD: 4.27 M/UL — SIGNIFICANT CHANGE UP (ref 3.8–5.2)
RBC # FLD: 13.2 % — SIGNIFICANT CHANGE UP (ref 10.3–14.5)
SODIUM SERPL-SCNC: 138 MMOL/L — SIGNIFICANT CHANGE UP (ref 135–145)
URATE SERPL-MCNC: 3 MG/DL — SIGNIFICANT CHANGE UP (ref 2.5–7)
WBC # BLD: 7.68 K/UL — SIGNIFICANT CHANGE UP (ref 3.8–10.5)
WBC # FLD AUTO: 7.68 K/UL — SIGNIFICANT CHANGE UP (ref 3.8–10.5)

## 2021-04-04 ENCOUNTER — NON-APPOINTMENT (OUTPATIENT)
Age: 36
End: 2021-04-04

## 2021-04-05 ENCOUNTER — NON-APPOINTMENT (OUTPATIENT)
Age: 36
End: 2021-04-05

## 2021-04-06 ENCOUNTER — NON-APPOINTMENT (OUTPATIENT)
Age: 36
End: 2021-04-06

## 2021-04-09 LAB
2ND TRIMESTER DATA: SIGNIFICANT CHANGE UP
AFP SERPL-ACNC: SIGNIFICANT CHANGE UP
B-HCG FREE SERPL-MCNC: SIGNIFICANT CHANGE UP
CLINICAL BIOCHEMIST REVIEW: SIGNIFICANT CHANGE UP
DEMOGRAPHIC DATA: SIGNIFICANT CHANGE UP
INHIBIN A SERPL-MCNC: SIGNIFICANT CHANGE UP
SCREEN-FOOTER: SIGNIFICANT CHANGE UP
SCREEN-RECOMMENDATIONS: SIGNIFICANT CHANGE UP
U ESTRIOL SERPL-SCNC: SIGNIFICANT CHANGE UP

## 2021-04-12 DIAGNOSIS — O34.12 MATERNAL CARE FOR BENIGN TUMOR OF CORPUS UTERI, SECOND TRIMESTER: ICD-10-CM

## 2021-04-12 DIAGNOSIS — O24.112 PRE-EXISTING TYPE 2 DIABETES MELLITUS, IN PREGNANCY, SECOND TRIMESTER: ICD-10-CM

## 2021-04-12 DIAGNOSIS — O34.219 MATERNAL CARE FOR UNSPECIFIED TYPE SCAR FROM PREVIOUS CESAREAN DELIVERY: ICD-10-CM

## 2021-04-13 ENCOUNTER — APPOINTMENT (OUTPATIENT)
Dept: CARDIOLOGY | Facility: CLINIC | Age: 36
End: 2021-04-13
Payer: COMMERCIAL

## 2021-04-13 DIAGNOSIS — Z87.898 PERSONAL HISTORY OF OTHER SPECIFIED CONDITIONS: ICD-10-CM

## 2021-04-13 DIAGNOSIS — Z98.891 HISTORY OF UTERINE SCAR FROM PREVIOUS SURGERY: ICD-10-CM

## 2021-04-13 PROCEDURE — 99214 OFFICE O/P EST MOD 30 MIN: CPT | Mod: 95

## 2021-04-14 DIAGNOSIS — E11.9 TYPE 2 DIABETES MELLITUS WITHOUT COMPLICATIONS: ICD-10-CM

## 2021-04-14 DIAGNOSIS — B37.3 CANDIDIASIS OF VULVA AND VAGINA: ICD-10-CM

## 2021-04-16 ENCOUNTER — APPOINTMENT (OUTPATIENT)
Dept: MATERNAL FETAL MEDICINE | Facility: CLINIC | Age: 36
End: 2021-04-16
Payer: COMMERCIAL

## 2021-04-16 ENCOUNTER — OUTPATIENT (OUTPATIENT)
Dept: OUTPATIENT SERVICES | Facility: HOSPITAL | Age: 36
LOS: 1 days | End: 2021-04-16
Payer: MEDICAID

## 2021-04-16 ENCOUNTER — NON-APPOINTMENT (OUTPATIENT)
Age: 36
End: 2021-04-16

## 2021-04-16 VITALS
HEART RATE: 82 BPM | HEIGHT: 61 IN | SYSTOLIC BLOOD PRESSURE: 110 MMHG | BODY MASS INDEX: 31.03 KG/M2 | OXYGEN SATURATION: 99 % | DIASTOLIC BLOOD PRESSURE: 70 MMHG | WEIGHT: 164.38 LBS

## 2021-04-16 DIAGNOSIS — O09.899 SUPERVISION OF OTHER HIGH RISK PREGNANCIES, UNSPECIFIED TRIMESTER: ICD-10-CM

## 2021-04-16 LAB
BILIRUB UR QL STRIP: NORMAL
GLUCOSE UR-MCNC: NORMAL
HCG UR QL: 1 EU/DL
HGB UR QL STRIP.AUTO: NORMAL
KETONES UR-MCNC: NORMAL
LEUKOCYTE ESTERASE UR QL STRIP: NORMAL
NITRITE UR QL STRIP: NORMAL
PH UR STRIP: 7
PROT UR STRIP-MCNC: NORMAL
SP GR UR STRIP: 1.02

## 2021-04-16 PROCEDURE — 81003 URINALYSIS AUTO W/O SCOPE: CPT

## 2021-04-16 PROCEDURE — G0463: CPT

## 2021-04-16 PROCEDURE — 99213 OFFICE O/P EST LOW 20 MIN: CPT | Mod: GE,25

## 2021-04-16 PROCEDURE — G0108 DIAB MANAGE TRN  PER INDIV: CPT

## 2021-04-16 PROCEDURE — G0108: CPT

## 2021-04-18 ENCOUNTER — NON-APPOINTMENT (OUTPATIENT)
Age: 36
End: 2021-04-18

## 2021-04-19 ENCOUNTER — NON-APPOINTMENT (OUTPATIENT)
Age: 36
End: 2021-04-19

## 2021-04-19 NOTE — ASSESSMENT
[FreeTextEntry1] : 35 year old female with  a history of type 2 diabetes and is currently 17 weeks pregnant. She reports that endocrine will be starting her on insulin today. Most recent Hgb A1C 7.4%.\par \par Patient reports that her blood pressure is normotensive\par Review of blood pressures recorded in [employer] are running between 112/70- 118/70\par \par PLAN\par In office visit in 4-6 weeks\par Echocardiogram to assess cardiac structure and function

## 2021-04-19 NOTE — HISTORY OF PRESENT ILLNESS
[Home] : at home, [unfilled] , at the time of the visit. [Other Location: e.g. Home (Enter Location, City,State)___] : at [unfilled] [FreeTextEntry1] : This is a 35 year old French speaking female with no known family history of cardiovascular disease. \par Translation was provider by [employer].   ID # [id number].\par \par Patient carries a history of type 2 diabetes and is currently 17 weeks pregnant. She reports that endocrine will be starting her on insulin today. Most recent Hgb A1C 7.4%.\par \par Of note, patient's pregnancy history is as follows:\par \par 1st pregnancy: , -> maternal bacteremia? temp? fetal demise\par 2nd pregnancy- termination\par 3rd pregnancy: current\par \par DUE DATE: 2021\par LMP:           2020\par \par She present today for a cardiovascular evaluation and follow up during her pregnancy by Cardio OB\par

## 2021-04-23 ENCOUNTER — APPOINTMENT (OUTPATIENT)
Dept: MATERNAL FETAL MEDICINE | Facility: CLINIC | Age: 36
End: 2021-04-23

## 2021-04-25 DIAGNOSIS — O09.511 SUPERVISION OF ELDERLY PRIMIGRAVIDA, FIRST TRIMESTER: ICD-10-CM

## 2021-04-25 DIAGNOSIS — E11.9 TYPE 2 DIABETES MELLITUS WITHOUT COMPLICATIONS: ICD-10-CM

## 2021-04-25 DIAGNOSIS — O09.92 SUPERVISION OF HIGH RISK PREGNANCY, UNSPECIFIED, SECOND TRIMESTER: ICD-10-CM

## 2021-04-25 DIAGNOSIS — O24.112 PRE-EXISTING TYPE 2 DIABETES MELLITUS, IN PREGNANCY, SECOND TRIMESTER: ICD-10-CM

## 2021-04-30 ENCOUNTER — APPOINTMENT (OUTPATIENT)
Dept: ANTEPARTUM | Facility: CLINIC | Age: 36
End: 2021-04-30
Payer: COMMERCIAL

## 2021-04-30 ENCOUNTER — ASOB RESULT (OUTPATIENT)
Age: 36
End: 2021-04-30

## 2021-04-30 PROCEDURE — 76811 OB US DETAILED SNGL FETUS: CPT | Mod: 26

## 2021-05-03 ENCOUNTER — APPOINTMENT (OUTPATIENT)
Dept: MATERNAL FETAL MEDICINE | Facility: CLINIC | Age: 36
End: 2021-05-03

## 2021-05-03 ENCOUNTER — NON-APPOINTMENT (OUTPATIENT)
Age: 36
End: 2021-05-03

## 2021-05-07 ENCOUNTER — APPOINTMENT (OUTPATIENT)
Dept: MATERNAL FETAL MEDICINE | Facility: CLINIC | Age: 36
End: 2021-05-07

## 2021-05-16 NOTE — ED PROVIDER NOTE - GENITOURINARY SPECULUM EXAM
[Educated Patient & Family about Diagnosis] : educated the patient and family about the diagnosis
abnormal/expand...

## 2021-05-17 ENCOUNTER — APPOINTMENT (OUTPATIENT)
Dept: ANTEPARTUM | Facility: CLINIC | Age: 36
End: 2021-05-17
Payer: MEDICAID

## 2021-05-17 ENCOUNTER — ASOB RESULT (OUTPATIENT)
Age: 36
End: 2021-05-17

## 2021-05-17 PROCEDURE — 76827 ECHO EXAM OF FETAL HEART: CPT

## 2021-05-17 PROCEDURE — 76825 ECHO EXAM OF FETAL HEART: CPT

## 2021-05-17 PROCEDURE — 93325 DOPPLER ECHO COLOR FLOW MAPG: CPT

## 2021-05-21 ENCOUNTER — APPOINTMENT (OUTPATIENT)
Dept: MATERNAL FETAL MEDICINE | Facility: CLINIC | Age: 36
End: 2021-05-21
Payer: MEDICAID

## 2021-05-21 ENCOUNTER — LABORATORY RESULT (OUTPATIENT)
Age: 36
End: 2021-05-21

## 2021-05-21 ENCOUNTER — OUTPATIENT (OUTPATIENT)
Dept: OUTPATIENT SERVICES | Facility: HOSPITAL | Age: 36
LOS: 1 days | End: 2021-05-21
Payer: MEDICAID

## 2021-05-21 ENCOUNTER — NON-APPOINTMENT (OUTPATIENT)
Age: 36
End: 2021-05-21

## 2021-05-21 ENCOUNTER — APPOINTMENT (OUTPATIENT)
Dept: ANTEPARTUM | Facility: CLINIC | Age: 36
End: 2021-05-21

## 2021-05-21 VITALS
HEIGHT: 61 IN | BODY MASS INDEX: 31.34 KG/M2 | OXYGEN SATURATION: 99 % | SYSTOLIC BLOOD PRESSURE: 110 MMHG | DIASTOLIC BLOOD PRESSURE: 70 MMHG | HEART RATE: 88 BPM | WEIGHT: 166 LBS

## 2021-05-21 DIAGNOSIS — O24.112 PRE-EXISTING TYPE 2 DIABETES MELLITUS, IN PREGNANCY, SECOND TRIMESTER: ICD-10-CM

## 2021-05-21 DIAGNOSIS — O09.899 SUPERVISION OF OTHER HIGH RISK PREGNANCIES, UNSPECIFIED TRIMESTER: ICD-10-CM

## 2021-05-21 LAB
BILIRUB UR QL STRIP: NEGATIVE
COLLECTION METHOD: NORMAL
GLUCOSE UR-MCNC: NEGATIVE
HCG UR QL: 0.2 EU/DL
HGB UR QL STRIP.AUTO: NEGATIVE
KETONES UR-MCNC: NEGATIVE
LEUKOCYTE ESTERASE UR QL STRIP: NORMAL
NITRITE UR QL STRIP: NEGATIVE
PH UR STRIP: 8
PROT UR STRIP-MCNC: NEGATIVE
SP GR UR STRIP: 1.02

## 2021-05-21 PROCEDURE — 99212 OFFICE O/P EST SF 10 MIN: CPT | Mod: GC,25

## 2021-05-21 PROCEDURE — G0108 DIAB MANAGE TRN  PER INDIV: CPT

## 2021-05-21 PROCEDURE — G0463: CPT

## 2021-05-21 PROCEDURE — G0108: CPT

## 2021-05-21 PROCEDURE — 87086 URINE CULTURE/COLONY COUNT: CPT

## 2021-05-21 PROCEDURE — 81420 FETAL CHRMOML ANEUPLOIDY: CPT

## 2021-05-21 PROCEDURE — 81003 URINALYSIS AUTO W/O SCOPE: CPT

## 2021-05-22 LAB
CULTURE RESULTS: SIGNIFICANT CHANGE UP
SPECIMEN SOURCE: SIGNIFICANT CHANGE UP

## 2021-05-24 ENCOUNTER — NON-APPOINTMENT (OUTPATIENT)
Age: 36
End: 2021-05-24

## 2021-05-26 ENCOUNTER — NON-APPOINTMENT (OUTPATIENT)
Age: 36
End: 2021-05-26

## 2021-05-27 ENCOUNTER — APPOINTMENT (OUTPATIENT)
Dept: CV DIAGNOSITCS | Facility: HOSPITAL | Age: 36
End: 2021-05-27

## 2021-05-27 ENCOUNTER — OUTPATIENT (OUTPATIENT)
Dept: OUTPATIENT SERVICES | Facility: HOSPITAL | Age: 36
LOS: 1 days | End: 2021-05-27
Payer: MEDICAID

## 2021-05-27 ENCOUNTER — NON-APPOINTMENT (OUTPATIENT)
Age: 36
End: 2021-05-27

## 2021-05-27 ENCOUNTER — APPOINTMENT (OUTPATIENT)
Dept: CARDIOLOGY | Facility: CLINIC | Age: 36
End: 2021-05-27
Payer: MEDICAID

## 2021-05-27 VITALS
WEIGHT: 166 LBS | HEART RATE: 101 BPM | OXYGEN SATURATION: 99 % | BODY MASS INDEX: 31.34 KG/M2 | SYSTOLIC BLOOD PRESSURE: 112 MMHG | HEIGHT: 61 IN | DIASTOLIC BLOOD PRESSURE: 75 MMHG

## 2021-05-27 DIAGNOSIS — I25.10 ATHEROSCLEROTIC HEART DISEASE OF NATIVE CORONARY ARTERY WITHOUT ANGINA PECTORIS: ICD-10-CM

## 2021-05-27 PROCEDURE — 93000 ELECTROCARDIOGRAM COMPLETE: CPT

## 2021-05-27 PROCEDURE — 93306 TTE W/DOPPLER COMPLETE: CPT | Mod: 26

## 2021-05-27 PROCEDURE — 99214 OFFICE O/P EST MOD 30 MIN: CPT

## 2021-05-27 PROCEDURE — 93306 TTE W/DOPPLER COMPLETE: CPT

## 2021-05-27 NOTE — ASSESSMENT
[FreeTextEntry1] : This is a 36 year old Croatian speaking female with no known family history of cardiovascular disease. \par Translation was provider by [employer].   ID # [id number].\par \par Patient carries a history of type 2 diabetes and is currently 23 weeks pregnant. She reports that endocrine will be starting her on insulin today. Most recent Hgb A1C 7.4%.\par \par Of note, patient's pregnancy history is as follows:\par \par 1st pregnancy: , -> maternal bacteremia? temp? fetal demise\par 2nd pregnancy- termination\par 3rd pregnancy: current\par \par DUE DATE: 2021\par LMP:           2020\par \par She present today for a cardiovascular evaluation and follow up during her pregnancy by Cardio OB\par \par PLAN\par In office visit in 10-12 weeks\par Echocardiogram to assess cardiac structure and function will be done today - will follow up with results\par ECG with no acute ischemic changes (stable from prior)\par hydration encouraged

## 2021-05-27 NOTE — HISTORY OF PRESENT ILLNESS
[Other Location: e.g. Home (Enter Location, City,State)___] : at [unfilled] [FreeTextEntry1] : This is a 36 year old Greenlandic speaking female with no known family history of cardiovascular disease. \par Translation was provider by [email or web address].   ID # [id number].\par \par Patient carries a history of type 2 diabetes and is currently 23 weeks pregnant. She reports that endocrine will be starting her on insulin today. Most recent Hgb A1C 7.4%.\par \par Of note, patient's pregnancy history is as follows:\par \par 1st pregnancy: , -> maternal bacteremia? temp? fetal demise\par 2nd pregnancy- termination\par 3rd pregnancy: current\par \par DUE DATE: 2021\par LMP: 2020\par \par Interval History\par May 27, 2021\par \par Patient presents today for a cardiovascular evaluation and follow up during her pregnancy by Cardio OB. Currently at 23 weeks estimated gestation.\par \par She feels well with no acute complaints. Denies chest pain, shortness of breath, palpitations, leg swelling. \par \par BPs well controlled, 112/75 today. ECG with no acute ischemic changes (stable from prior).  Pt is followed for DM, blood sugars well controlled on Metformin 1000mg BID and 14U NPH Hs. \par \par \par

## 2021-05-28 ENCOUNTER — APPOINTMENT (OUTPATIENT)
Dept: MATERNAL FETAL MEDICINE | Facility: CLINIC | Age: 36
End: 2021-05-28

## 2021-06-03 ENCOUNTER — NON-APPOINTMENT (OUTPATIENT)
Age: 36
End: 2021-06-03

## 2021-06-04 LAB
CHR 21 TS BLD/T QL: SIGNIFICANT CHANGE UP
CLARI TEST NIPT W/MICRO - RESULTS: SIGNIFICANT CHANGE UP
CLARIM 15Q11.2: SIGNIFICANT CHANGE UP
CLARIM 1P36: SIGNIFICANT CHANGE UP
CLARIM 22Q11.2: SIGNIFICANT CHANGE UP
CLARIM 4P-/WOLF-HIRSCHHORN: SIGNIFICANT CHANGE UP
CLARIM 5P-/CRI DU CHAT: SIGNIFICANT CHANGE UP
CLARIM ADDITIONAL INFO: SIGNIFICANT CHANGE UP
CLARIM CHROMOSOME 13: SIGNIFICANT CHANGE UP
CLARIM CHROMOSOME 18: SIGNIFICANT CHANGE UP
CLARIM SEX CHROMOSOMES: SIGNIFICANT CHANGE UP

## 2021-06-09 ENCOUNTER — NON-APPOINTMENT (OUTPATIENT)
Age: 36
End: 2021-06-09

## 2021-06-15 ENCOUNTER — APPOINTMENT (OUTPATIENT)
Dept: ANTEPARTUM | Facility: CLINIC | Age: 36
End: 2021-06-15
Payer: MEDICAID

## 2021-06-15 ENCOUNTER — NON-APPOINTMENT (OUTPATIENT)
Age: 36
End: 2021-06-15

## 2021-06-15 ENCOUNTER — APPOINTMENT (OUTPATIENT)
Dept: MATERNAL FETAL MEDICINE | Facility: CLINIC | Age: 36
End: 2021-06-15
Payer: MEDICAID

## 2021-06-15 ENCOUNTER — ASOB RESULT (OUTPATIENT)
Age: 36
End: 2021-06-15

## 2021-06-15 ENCOUNTER — OUTPATIENT (OUTPATIENT)
Dept: OUTPATIENT SERVICES | Facility: HOSPITAL | Age: 36
LOS: 1 days | End: 2021-06-15
Payer: MEDICAID

## 2021-06-15 VITALS
WEIGHT: 166 LBS | HEIGHT: 61 IN | OXYGEN SATURATION: 98 % | DIASTOLIC BLOOD PRESSURE: 70 MMHG | SYSTOLIC BLOOD PRESSURE: 120 MMHG | BODY MASS INDEX: 31.34 KG/M2 | HEART RATE: 85 BPM

## 2021-06-15 DIAGNOSIS — O09.899 SUPERVISION OF OTHER HIGH RISK PREGNANCIES, UNSPECIFIED TRIMESTER: ICD-10-CM

## 2021-06-15 DIAGNOSIS — O24.312 UNSPECIFIED PRE-EXISTING DIABETES MELLITUS IN PREGNANCY, SECOND TRIMESTER: ICD-10-CM

## 2021-06-15 PROCEDURE — 76816 OB US FOLLOW-UP PER FETUS: CPT | Mod: 26

## 2021-06-15 PROCEDURE — 99213 OFFICE O/P EST LOW 20 MIN: CPT | Mod: 25

## 2021-06-15 PROCEDURE — G0463: CPT

## 2021-06-15 PROCEDURE — G0108: CPT

## 2021-06-15 PROCEDURE — G0108 DIAB MANAGE TRN  PER INDIV: CPT

## 2021-06-22 ENCOUNTER — APPOINTMENT (OUTPATIENT)
Dept: MATERNAL FETAL MEDICINE | Facility: CLINIC | Age: 36
End: 2021-06-22

## 2021-06-25 ENCOUNTER — NON-APPOINTMENT (OUTPATIENT)
Age: 36
End: 2021-06-25

## 2021-07-04 ENCOUNTER — NON-APPOINTMENT (OUTPATIENT)
Age: 36
End: 2021-07-04

## 2021-07-09 ENCOUNTER — APPOINTMENT (OUTPATIENT)
Dept: MATERNAL FETAL MEDICINE | Facility: CLINIC | Age: 36
End: 2021-07-09

## 2021-07-16 ENCOUNTER — APPOINTMENT (OUTPATIENT)
Dept: MATERNAL FETAL MEDICINE | Facility: CLINIC | Age: 36
End: 2021-07-16
Payer: MEDICAID

## 2021-07-16 ENCOUNTER — OUTPATIENT (OUTPATIENT)
Dept: OUTPATIENT SERVICES | Facility: HOSPITAL | Age: 36
LOS: 1 days | End: 2021-07-16
Payer: MEDICAID

## 2021-07-16 ENCOUNTER — NON-APPOINTMENT (OUTPATIENT)
Age: 36
End: 2021-07-16

## 2021-07-16 ENCOUNTER — APPOINTMENT (OUTPATIENT)
Dept: ANTEPARTUM | Facility: CLINIC | Age: 36
End: 2021-07-16

## 2021-07-16 ENCOUNTER — ASOB RESULT (OUTPATIENT)
Age: 36
End: 2021-07-16

## 2021-07-16 ENCOUNTER — APPOINTMENT (OUTPATIENT)
Dept: ANTEPARTUM | Facility: CLINIC | Age: 36
End: 2021-07-16
Payer: MEDICAID

## 2021-07-16 ENCOUNTER — LABORATORY RESULT (OUTPATIENT)
Age: 36
End: 2021-07-16

## 2021-07-16 VITALS
HEIGHT: 61 IN | OXYGEN SATURATION: 98 % | BODY MASS INDEX: 32.28 KG/M2 | HEART RATE: 98 BPM | WEIGHT: 171 LBS | SYSTOLIC BLOOD PRESSURE: 102 MMHG | DIASTOLIC BLOOD PRESSURE: 62 MMHG

## 2021-07-16 DIAGNOSIS — O09.899 SUPERVISION OF OTHER HIGH RISK PREGNANCIES, UNSPECIFIED TRIMESTER: ICD-10-CM

## 2021-07-16 LAB
BILIRUB UR QL STRIP: NORMAL
GLUCOSE UR-MCNC: NORMAL
HCG UR QL: 0.2 EU/DL
HCT VFR BLD CALC: 35.6 % — SIGNIFICANT CHANGE UP (ref 34.5–45)
HGB BLD-MCNC: 11.5 G/DL — SIGNIFICANT CHANGE UP (ref 11.5–15.5)
HGB UR QL STRIP.AUTO: NORMAL
KETONES UR-MCNC: NORMAL
LEUKOCYTE ESTERASE UR QL STRIP: NORMAL
MCHC RBC-ENTMCNC: 28.5 PG — SIGNIFICANT CHANGE UP (ref 27–34)
MCHC RBC-ENTMCNC: 32.3 GM/DL — SIGNIFICANT CHANGE UP (ref 32–36)
MCV RBC AUTO: 88.1 FL — SIGNIFICANT CHANGE UP (ref 80–100)
NITRITE UR QL STRIP: NORMAL
PH UR STRIP: 7
PLATELET # BLD AUTO: 265 K/UL — SIGNIFICANT CHANGE UP (ref 150–400)
PROT UR STRIP-MCNC: NORMAL
RBC # BLD: 4.04 M/UL — SIGNIFICANT CHANGE UP (ref 3.8–5.2)
RBC # FLD: 12.7 % — SIGNIFICANT CHANGE UP (ref 10.3–14.5)
SP GR UR STRIP: 1.01
WBC # BLD: 7.85 K/UL — SIGNIFICANT CHANGE UP (ref 3.8–10.5)
WBC # FLD AUTO: 7.85 K/UL — SIGNIFICANT CHANGE UP (ref 3.8–10.5)

## 2021-07-16 PROCEDURE — G0108 DIAB MANAGE TRN  PER INDIV: CPT

## 2021-07-16 PROCEDURE — 76816 OB US FOLLOW-UP PER FETUS: CPT | Mod: 26

## 2021-07-16 PROCEDURE — 86900 BLOOD TYPING SEROLOGIC ABO: CPT

## 2021-07-16 PROCEDURE — G0108: CPT

## 2021-07-16 PROCEDURE — 85027 COMPLETE CBC AUTOMATED: CPT

## 2021-07-16 PROCEDURE — 90715 TDAP VACCINE 7 YRS/> IM: CPT

## 2021-07-16 PROCEDURE — 99213 OFFICE O/P EST LOW 20 MIN: CPT | Mod: 25,GE

## 2021-07-16 PROCEDURE — 76816 OB US FOLLOW-UP PER FETUS: CPT

## 2021-07-16 PROCEDURE — G0463: CPT

## 2021-07-16 PROCEDURE — 86850 RBC ANTIBODY SCREEN: CPT

## 2021-07-16 PROCEDURE — 86901 BLOOD TYPING SEROLOGIC RH(D): CPT

## 2021-07-16 PROCEDURE — 81003 URINALYSIS AUTO W/O SCOPE: CPT

## 2021-07-16 PROCEDURE — 90471 IMMUNIZATION ADMIN: CPT

## 2021-07-18 ENCOUNTER — NON-APPOINTMENT (OUTPATIENT)
Age: 36
End: 2021-07-18

## 2021-07-19 ENCOUNTER — NON-APPOINTMENT (OUTPATIENT)
Age: 36
End: 2021-07-19

## 2021-07-23 ENCOUNTER — ASOB RESULT (OUTPATIENT)
Age: 36
End: 2021-07-23

## 2021-07-23 ENCOUNTER — NON-APPOINTMENT (OUTPATIENT)
Age: 36
End: 2021-07-23

## 2021-07-23 ENCOUNTER — OUTPATIENT (OUTPATIENT)
Dept: OUTPATIENT SERVICES | Facility: HOSPITAL | Age: 36
LOS: 1 days | End: 2021-07-23
Payer: MEDICAID

## 2021-07-23 ENCOUNTER — APPOINTMENT (OUTPATIENT)
Dept: MATERNAL FETAL MEDICINE | Facility: CLINIC | Age: 36
End: 2021-07-23
Payer: MEDICAID

## 2021-07-23 ENCOUNTER — APPOINTMENT (OUTPATIENT)
Dept: ANTEPARTUM | Facility: CLINIC | Age: 36
End: 2021-07-23
Payer: MEDICAID

## 2021-07-23 VITALS
HEART RATE: 97 BPM | OXYGEN SATURATION: 98 % | HEIGHT: 61 IN | BODY MASS INDEX: 32.32 KG/M2 | DIASTOLIC BLOOD PRESSURE: 72 MMHG | SYSTOLIC BLOOD PRESSURE: 110 MMHG | WEIGHT: 171.19 LBS

## 2021-07-23 DIAGNOSIS — O43.122 VELAMENTOUS INSERTION OF UMBILICAL CORD, SECOND TRIMESTER: ICD-10-CM

## 2021-07-23 DIAGNOSIS — Z3A.31 31 WEEKS GESTATION OF PREGNANCY: ICD-10-CM

## 2021-07-23 DIAGNOSIS — O09.522 SUPERVISION OF ELDERLY MULTIGRAVIDA, SECOND TRIMESTER: ICD-10-CM

## 2021-07-23 DIAGNOSIS — O09.899 SUPERVISION OF OTHER HIGH RISK PREGNANCIES, UNSPECIFIED TRIMESTER: ICD-10-CM

## 2021-07-23 DIAGNOSIS — O34.219 MATERNAL CARE FOR UNSPECIFIED TYPE SCAR FROM PREVIOUS CESAREAN DELIVERY: ICD-10-CM

## 2021-07-23 DIAGNOSIS — O09.90 SUPERVISION OF HIGH RISK PREGNANCY, UNSPECIFIED, UNSPECIFIED TRIMESTER: ICD-10-CM

## 2021-07-23 DIAGNOSIS — O24.112 PRE-EXISTING TYPE 2 DIABETES MELLITUS, IN PREGNANCY, SECOND TRIMESTER: ICD-10-CM

## 2021-07-23 PROCEDURE — G0108 DIAB MANAGE TRN  PER INDIV: CPT

## 2021-07-23 PROCEDURE — 76818 FETAL BIOPHYS PROFILE W/NST: CPT | Mod: 26

## 2021-07-23 PROCEDURE — 81003 URINALYSIS AUTO W/O SCOPE: CPT

## 2021-07-23 PROCEDURE — G0463: CPT

## 2021-07-23 PROCEDURE — G0108: CPT

## 2021-07-23 PROCEDURE — 99213 OFFICE O/P EST LOW 20 MIN: CPT | Mod: GC,25

## 2021-07-27 ENCOUNTER — APPOINTMENT (OUTPATIENT)
Dept: ANTEPARTUM | Facility: CLINIC | Age: 36
End: 2021-07-27

## 2021-07-30 ENCOUNTER — ASOB RESULT (OUTPATIENT)
Age: 36
End: 2021-07-30

## 2021-07-30 ENCOUNTER — APPOINTMENT (OUTPATIENT)
Dept: ANTEPARTUM | Facility: CLINIC | Age: 36
End: 2021-07-30

## 2021-07-30 ENCOUNTER — LABORATORY RESULT (OUTPATIENT)
Age: 36
End: 2021-07-30

## 2021-07-30 ENCOUNTER — APPOINTMENT (OUTPATIENT)
Dept: MATERNAL FETAL MEDICINE | Facility: CLINIC | Age: 36
End: 2021-07-30
Payer: MEDICAID

## 2021-07-30 ENCOUNTER — APPOINTMENT (OUTPATIENT)
Dept: ANTEPARTUM | Facility: CLINIC | Age: 36
End: 2021-07-30
Payer: MEDICAID

## 2021-07-30 ENCOUNTER — OUTPATIENT (OUTPATIENT)
Dept: OUTPATIENT SERVICES | Facility: HOSPITAL | Age: 36
LOS: 1 days | End: 2021-07-30
Payer: MEDICAID

## 2021-07-30 VITALS
SYSTOLIC BLOOD PRESSURE: 111 MMHG | HEART RATE: 83 BPM | HEIGHT: 61 IN | OXYGEN SATURATION: 99 % | WEIGHT: 171.38 LBS | BODY MASS INDEX: 32.36 KG/M2 | DIASTOLIC BLOOD PRESSURE: 71 MMHG

## 2021-07-30 DIAGNOSIS — K21.9 GASTRO-ESOPHAGEAL REFLUX DISEASE WITHOUT ESOPHAGITIS: ICD-10-CM

## 2021-07-30 DIAGNOSIS — O09.93 SUPERVISION OF HIGH RISK PREGNANCY, UNSPECIFIED, THIRD TRIMESTER: ICD-10-CM

## 2021-07-30 DIAGNOSIS — O24.419 GESTATIONAL DIABETES MELLITUS IN PREGNANCY, UNSPECIFIED CONTROL: ICD-10-CM

## 2021-07-30 DIAGNOSIS — O09.899 SUPERVISION OF OTHER HIGH RISK PREGNANCIES, UNSPECIFIED TRIMESTER: ICD-10-CM

## 2021-07-30 DIAGNOSIS — E11.9 TYPE 2 DIABETES MELLITUS WITHOUT COMPLICATIONS: ICD-10-CM

## 2021-07-30 PROCEDURE — G0108: CPT

## 2021-07-30 PROCEDURE — 36415 COLL VENOUS BLD VENIPUNCTURE: CPT

## 2021-07-30 PROCEDURE — G0463: CPT

## 2021-07-30 PROCEDURE — 76818 FETAL BIOPHYS PROFILE W/NST: CPT | Mod: 26

## 2021-07-30 PROCEDURE — 81003 URINALYSIS AUTO W/O SCOPE: CPT

## 2021-07-30 PROCEDURE — 99213 OFFICE O/P EST LOW 20 MIN: CPT | Mod: 25,GC

## 2021-07-30 PROCEDURE — 83036 HEMOGLOBIN GLYCOSYLATED A1C: CPT

## 2021-07-30 PROCEDURE — 36415 COLL VENOUS BLD VENIPUNCTURE: CPT | Mod: NC

## 2021-07-30 PROCEDURE — G0108 DIAB MANAGE TRN  PER INDIV: CPT

## 2021-07-31 LAB
A1C WITH ESTIMATED AVERAGE GLUCOSE RESULT: 5.7 % — HIGH (ref 4–5.6)
ESTIMATED AVERAGE GLUCOSE: 117 MG/DL — HIGH (ref 68–114)

## 2021-08-02 ENCOUNTER — NON-APPOINTMENT (OUTPATIENT)
Age: 36
End: 2021-08-02

## 2021-08-03 ENCOUNTER — OUTPATIENT (OUTPATIENT)
Dept: OUTPATIENT SERVICES | Facility: HOSPITAL | Age: 36
LOS: 1 days | End: 2021-08-03
Payer: MEDICAID

## 2021-08-03 ENCOUNTER — ASOB RESULT (OUTPATIENT)
Age: 36
End: 2021-08-03

## 2021-08-03 ENCOUNTER — APPOINTMENT (OUTPATIENT)
Dept: ANTEPARTUM | Facility: CLINIC | Age: 36
End: 2021-08-03
Payer: MEDICAID

## 2021-08-03 ENCOUNTER — RX RENEWAL (OUTPATIENT)
Age: 36
End: 2021-08-03

## 2021-08-03 PROCEDURE — 76819 FETAL BIOPHYS PROFIL W/O NST: CPT | Mod: 26

## 2021-08-03 PROCEDURE — 76819 FETAL BIOPHYS PROFIL W/O NST: CPT

## 2021-08-06 ENCOUNTER — APPOINTMENT (OUTPATIENT)
Dept: ANTEPARTUM | Facility: CLINIC | Age: 36
End: 2021-08-06

## 2021-08-06 ENCOUNTER — APPOINTMENT (OUTPATIENT)
Dept: MATERNAL FETAL MEDICINE | Facility: CLINIC | Age: 36
End: 2021-08-06

## 2021-08-10 ENCOUNTER — APPOINTMENT (OUTPATIENT)
Dept: ANTEPARTUM | Facility: CLINIC | Age: 36
End: 2021-08-10

## 2021-08-13 ENCOUNTER — APPOINTMENT (OUTPATIENT)
Dept: ANTEPARTUM | Facility: CLINIC | Age: 36
End: 2021-08-13
Payer: MEDICAID

## 2021-08-13 ENCOUNTER — ASOB RESULT (OUTPATIENT)
Age: 36
End: 2021-08-13

## 2021-08-13 ENCOUNTER — OUTPATIENT (OUTPATIENT)
Dept: OUTPATIENT SERVICES | Facility: HOSPITAL | Age: 36
LOS: 1 days | End: 2021-08-13
Payer: MEDICAID

## 2021-08-13 ENCOUNTER — APPOINTMENT (OUTPATIENT)
Dept: MATERNAL FETAL MEDICINE | Facility: CLINIC | Age: 36
End: 2021-08-13
Payer: MEDICAID

## 2021-08-13 ENCOUNTER — APPOINTMENT (OUTPATIENT)
Dept: ANTEPARTUM | Facility: CLINIC | Age: 36
End: 2021-08-13

## 2021-08-13 VITALS
HEIGHT: 61 IN | BODY MASS INDEX: 32.71 KG/M2 | HEART RATE: 88 BPM | DIASTOLIC BLOOD PRESSURE: 68 MMHG | WEIGHT: 173.25 LBS | OXYGEN SATURATION: 99 % | SYSTOLIC BLOOD PRESSURE: 101 MMHG

## 2021-08-13 DIAGNOSIS — O99.210 OBESITY COMPLICATING PREGNANCY, UNSPECIFIED TRIMESTER: ICD-10-CM

## 2021-08-13 DIAGNOSIS — O09.90 SUPERVISION OF HIGH RISK PREGNANCY, UNSPECIFIED, UNSPECIFIED TRIMESTER: ICD-10-CM

## 2021-08-13 DIAGNOSIS — O24.319 UNSPECIFIED PRE-EXISTING DIABETES MELLITUS IN PREGNANCY, UNSPECIFIED TRIMESTER: ICD-10-CM

## 2021-08-13 DIAGNOSIS — O09.899 SUPERVISION OF OTHER HIGH RISK PREGNANCIES, UNSPECIFIED TRIMESTER: ICD-10-CM

## 2021-08-13 LAB
BILIRUB UR QL STRIP: NORMAL
CLARITY UR: CLEAR
GLUCOSE UR-MCNC: NORMAL
HCG UR QL: 0.2 EU/DL
HGB UR QL STRIP.AUTO: NORMAL
KETONES UR-MCNC: NORMAL
LEUKOCYTE ESTERASE UR QL STRIP: NORMAL
NITRITE UR QL STRIP: NORMAL
PH UR STRIP: 6.5
PROT UR STRIP-MCNC: NORMAL
SP GR UR STRIP: 1.01

## 2021-08-13 PROCEDURE — 76816 OB US FOLLOW-UP PER FETUS: CPT | Mod: 26

## 2021-08-13 PROCEDURE — 81003 URINALYSIS AUTO W/O SCOPE: CPT

## 2021-08-13 PROCEDURE — G0108: CPT

## 2021-08-13 PROCEDURE — 99213 OFFICE O/P EST LOW 20 MIN: CPT | Mod: GC,25

## 2021-08-13 PROCEDURE — 76818 FETAL BIOPHYS PROFILE W/NST: CPT | Mod: 26

## 2021-08-13 PROCEDURE — G0108 DIAB MANAGE TRN  PER INDIV: CPT

## 2021-08-13 PROCEDURE — G0463: CPT

## 2021-08-17 ENCOUNTER — APPOINTMENT (OUTPATIENT)
Dept: ANTEPARTUM | Facility: CLINIC | Age: 36
End: 2021-08-17
Payer: MEDICAID

## 2021-08-17 ENCOUNTER — APPOINTMENT (OUTPATIENT)
Dept: ANTEPARTUM | Facility: CLINIC | Age: 36
End: 2021-08-17

## 2021-08-17 ENCOUNTER — OUTPATIENT (OUTPATIENT)
Dept: OUTPATIENT SERVICES | Facility: HOSPITAL | Age: 36
LOS: 1 days | End: 2021-08-17
Payer: MEDICAID

## 2021-08-17 ENCOUNTER — ASOB RESULT (OUTPATIENT)
Age: 36
End: 2021-08-17

## 2021-08-17 PROCEDURE — 76819 FETAL BIOPHYS PROFIL W/O NST: CPT

## 2021-08-17 PROCEDURE — 76819 FETAL BIOPHYS PROFIL W/O NST: CPT | Mod: 26

## 2021-08-19 ENCOUNTER — NON-APPOINTMENT (OUTPATIENT)
Age: 36
End: 2021-08-19

## 2021-08-20 ENCOUNTER — APPOINTMENT (OUTPATIENT)
Dept: ANTEPARTUM | Facility: CLINIC | Age: 36
End: 2021-08-20

## 2021-08-20 ENCOUNTER — APPOINTMENT (OUTPATIENT)
Dept: MATERNAL FETAL MEDICINE | Facility: CLINIC | Age: 36
End: 2021-08-20

## 2021-08-24 ENCOUNTER — OUTPATIENT (OUTPATIENT)
Dept: OUTPATIENT SERVICES | Facility: HOSPITAL | Age: 36
LOS: 1 days | End: 2021-08-24
Payer: MEDICAID

## 2021-08-24 ENCOUNTER — LABORATORY RESULT (OUTPATIENT)
Age: 36
End: 2021-08-24

## 2021-08-24 ENCOUNTER — APPOINTMENT (OUTPATIENT)
Dept: ANTEPARTUM | Facility: CLINIC | Age: 36
End: 2021-08-24
Payer: MEDICAID

## 2021-08-24 ENCOUNTER — ASOB RESULT (OUTPATIENT)
Age: 36
End: 2021-08-24

## 2021-08-24 ENCOUNTER — APPOINTMENT (OUTPATIENT)
Dept: MATERNAL FETAL MEDICINE | Facility: CLINIC | Age: 36
End: 2021-08-24
Payer: MEDICAID

## 2021-08-24 VITALS
DIASTOLIC BLOOD PRESSURE: 70 MMHG | SYSTOLIC BLOOD PRESSURE: 104 MMHG | BODY MASS INDEX: 32.85 KG/M2 | HEART RATE: 95 BPM | HEIGHT: 61 IN | OXYGEN SATURATION: 99 % | WEIGHT: 174 LBS

## 2021-08-24 DIAGNOSIS — O24.112 PRE-EXISTING TYPE 2 DIABETES MELLITUS, IN PREGNANCY, SECOND TRIMESTER: ICD-10-CM

## 2021-08-24 DIAGNOSIS — Z3A.37 37 WEEKS GESTATION OF PREGNANCY: ICD-10-CM

## 2021-08-24 DIAGNOSIS — O34.219 MATERNAL CARE FOR UNSPECIFIED TYPE SCAR FROM PREVIOUS CESAREAN DELIVERY: ICD-10-CM

## 2021-08-24 DIAGNOSIS — O43.122 VELAMENTOUS INSERTION OF UMBILICAL CORD, SECOND TRIMESTER: ICD-10-CM

## 2021-08-24 DIAGNOSIS — O09.522 SUPERVISION OF ELDERLY MULTIGRAVIDA, SECOND TRIMESTER: ICD-10-CM

## 2021-08-24 DIAGNOSIS — O34.12 MATERNAL CARE FOR BENIGN TUMOR OF CORPUS UTERI, SECOND TRIMESTER: ICD-10-CM

## 2021-08-24 DIAGNOSIS — O09.93 SUPERVISION OF HIGH RISK PREGNANCY, UNSPECIFIED, THIRD TRIMESTER: ICD-10-CM

## 2021-08-24 DIAGNOSIS — O24.113 PRE-EXISTING TYPE 2 DIABETES MELLITUS, IN PREGNANCY, THIRD TRIMESTER: ICD-10-CM

## 2021-08-24 DIAGNOSIS — O09.899 SUPERVISION OF OTHER HIGH RISK PREGNANCIES, UNSPECIFIED TRIMESTER: ICD-10-CM

## 2021-08-24 LAB
BILIRUB UR QL STRIP: NORMAL
CLARITY UR: CLEAR
GLUCOSE UR-MCNC: NORMAL
HCG UR QL: 0.2 EU/DL
HGB UR QL STRIP.AUTO: NORMAL
KETONES UR-MCNC: NORMAL
LEUKOCYTE ESTERASE UR QL STRIP: NORMAL
NITRITE UR QL STRIP: NORMAL
PH UR STRIP: 7
PROT UR STRIP-MCNC: NORMAL
SP GR UR STRIP: 1.01

## 2021-08-24 PROCEDURE — 85027 COMPLETE CBC AUTOMATED: CPT

## 2021-08-24 PROCEDURE — 86780 TREPONEMA PALLIDUM: CPT

## 2021-08-24 PROCEDURE — 76818 FETAL BIOPHYS PROFILE W/NST: CPT

## 2021-08-24 PROCEDURE — 36415 COLL VENOUS BLD VENIPUNCTURE: CPT

## 2021-08-24 PROCEDURE — 76818 FETAL BIOPHYS PROFILE W/NST: CPT | Mod: 26

## 2021-08-24 PROCEDURE — 81003 URINALYSIS AUTO W/O SCOPE: CPT

## 2021-08-24 PROCEDURE — 86769 SARS-COV-2 COVID-19 ANTIBODY: CPT

## 2021-08-24 PROCEDURE — 87389 HIV-1 AG W/HIV-1&-2 AB AG IA: CPT

## 2021-08-24 PROCEDURE — G0463: CPT

## 2021-08-24 PROCEDURE — 99213 OFFICE O/P EST LOW 20 MIN: CPT | Mod: 25,GE

## 2021-08-24 PROCEDURE — 87653 STREP B DNA AMP PROBE: CPT

## 2021-08-25 LAB
COVID-19 SPIKE DOMAIN AB INTERP: POSITIVE
COVID-19 SPIKE DOMAIN ANTIBODY RESULT: 93.8 U/ML — HIGH
HCT VFR BLD CALC: 37.7 % — SIGNIFICANT CHANGE UP (ref 34.5–45)
HGB BLD-MCNC: 11.9 G/DL — SIGNIFICANT CHANGE UP (ref 11.5–15.5)
HIV 1+2 AB+HIV1 P24 AG SERPL QL IA: SIGNIFICANT CHANGE UP
MCHC RBC-ENTMCNC: 27.6 PG — SIGNIFICANT CHANGE UP (ref 27–34)
MCHC RBC-ENTMCNC: 31.6 GM/DL — LOW (ref 32–36)
MCV RBC AUTO: 87.5 FL — SIGNIFICANT CHANGE UP (ref 80–100)
PLATELET # BLD AUTO: 229 K/UL — SIGNIFICANT CHANGE UP (ref 150–400)
RBC # BLD: 4.31 M/UL — SIGNIFICANT CHANGE UP (ref 3.8–5.2)
RBC # FLD: 13.1 % — SIGNIFICANT CHANGE UP (ref 10.3–14.5)
SARS-COV-2 IGG+IGM SERPL QL IA: 93.8 U/ML — HIGH
SARS-COV-2 IGG+IGM SERPL QL IA: POSITIVE
T PALLIDUM AB TITR SER: NEGATIVE — SIGNIFICANT CHANGE UP
WBC # BLD: 8.45 K/UL — SIGNIFICANT CHANGE UP (ref 3.8–10.5)
WBC # FLD AUTO: 8.45 K/UL — SIGNIFICANT CHANGE UP (ref 3.8–10.5)

## 2021-08-26 LAB
GROUP B BETA STREP DNA (PCR): DETECTED
GROUP B BETA STREP INTERPRETATION: SIGNIFICANT CHANGE UP
SOURCE GROUP B STREP: SIGNIFICANT CHANGE UP

## 2021-08-27 ENCOUNTER — APPOINTMENT (OUTPATIENT)
Dept: ANTEPARTUM | Facility: CLINIC | Age: 36
End: 2021-08-27

## 2021-08-27 ENCOUNTER — ASOB RESULT (OUTPATIENT)
Age: 36
End: 2021-08-27

## 2021-08-27 ENCOUNTER — APPOINTMENT (OUTPATIENT)
Dept: ANTEPARTUM | Facility: CLINIC | Age: 36
End: 2021-08-27
Payer: MEDICAID

## 2021-08-27 PROCEDURE — 76818 FETAL BIOPHYS PROFILE W/NST: CPT | Mod: 26

## 2021-08-30 ENCOUNTER — APPOINTMENT (OUTPATIENT)
Dept: CARDIOLOGY | Facility: CLINIC | Age: 36
End: 2021-08-30
Payer: MEDICAID

## 2021-08-30 VITALS — SYSTOLIC BLOOD PRESSURE: 118 MMHG | HEART RATE: 85 BPM | DIASTOLIC BLOOD PRESSURE: 79 MMHG

## 2021-08-30 PROCEDURE — 99214 OFFICE O/P EST MOD 30 MIN: CPT

## 2021-08-30 PROCEDURE — 93000 ELECTROCARDIOGRAM COMPLETE: CPT

## 2021-08-30 NOTE — HISTORY OF PRESENT ILLNESS
[FreeTextEntry1] : This is a 36 year old Kazakh speaking female with no known family history of cardiovascular disease. \par Translation was provider by [employer].   ID # [id number]\par \par Patient carries a history of type 2 diabetes and is currently 38 weeks pregnant. She reports that endocrine will be starting her on insulin today. Most recent Hgb A1C 7.4%.\par \par Of note, patient's pregnancy history is as follows:\par \par 1st pregnancy: , -> maternal bacteremia? temp? fetal demise\par 2nd pregnancy- termination\par 3rd pregnancy: current\par \par DUE DATE: 2021\par LMP: 2020\par \par Interval History\par 21\par \par Patient presents today for a cardiovascular evaluation and follow up during her pregnancy by Cardio OB. Currently at 38 weeks estimated gestation.\par \par She feels well with no acute complaints. Denies chest pain, shortness of breath, palpitations, leg swelling. \par \par BPs well controlled, 118/79 today. ECG with no acute ischemic changes (stable from prior).  Pt is followed for DM, blood sugars well controlled on Metformin 1000mg BID and 14U NPH Hs. \par \par \par

## 2021-08-30 NOTE — ASSESSMENT
[FreeTextEntry1] : This is a 36 year old Georgian speaking female with no known family history of cardiovascular disease. \par Translation was provider by [employer].   ID # [id number].\par \par Patient carries a history of type 2 diabetes and is currently 38 weeks pregnant. She reports that endocrine will be starting her on insulin today. Most recent Hgb A1C 7.4%.\par \par Of note, patient's pregnancy history is as follows:\par \par 1st pregnancy: , -> maternal bacteremia? temp? fetal demise\par 2nd pregnancy- termination\par 3rd pregnancy: current\par \par DUE DATE: 2021\par LMP:           2020\par \par She present today for a cardiovascular evaluation and follow up during her pregnancy by Cardio OB\par \par PLAN\par In office visit in 10-12 weeks\par Echocardiogram to assess cardiac structure and function will be done today - will follow up with results\par ECG with no acute ischemic changes (stable from prior)\par hydration encouraged\par bp well controlled\par Encouraged the patient to monitor blood pressure at home, keep a log, and report results back to us for evaluation. Based on results, we will adjust the regimen as necessary.\par \par

## 2021-08-31 ENCOUNTER — APPOINTMENT (OUTPATIENT)
Dept: MATERNAL FETAL MEDICINE | Facility: CLINIC | Age: 36
End: 2021-08-31
Payer: MEDICAID

## 2021-08-31 ENCOUNTER — APPOINTMENT (OUTPATIENT)
Dept: ANTEPARTUM | Facility: CLINIC | Age: 36
End: 2021-08-31
Payer: MEDICAID

## 2021-08-31 ENCOUNTER — RESULT REVIEW (OUTPATIENT)
Age: 36
End: 2021-08-31

## 2021-08-31 ENCOUNTER — ASOB RESULT (OUTPATIENT)
Age: 36
End: 2021-08-31

## 2021-08-31 ENCOUNTER — TRANSCRIPTION ENCOUNTER (OUTPATIENT)
Age: 36
End: 2021-08-31

## 2021-08-31 ENCOUNTER — NON-APPOINTMENT (OUTPATIENT)
Age: 36
End: 2021-08-31

## 2021-08-31 ENCOUNTER — OUTPATIENT (OUTPATIENT)
Dept: OUTPATIENT SERVICES | Facility: HOSPITAL | Age: 36
LOS: 1 days | End: 2021-08-31
Payer: MEDICAID

## 2021-08-31 ENCOUNTER — INPATIENT (INPATIENT)
Facility: HOSPITAL | Age: 36
LOS: 2 days | Discharge: HOME | End: 2021-09-03
Attending: OBSTETRICS & GYNECOLOGY | Admitting: OBSTETRICS & GYNECOLOGY
Payer: MEDICAID

## 2021-08-31 VITALS — OXYGEN SATURATION: 100 % | SYSTOLIC BLOOD PRESSURE: 131 MMHG | HEART RATE: 80 BPM | DIASTOLIC BLOOD PRESSURE: 78 MMHG

## 2021-08-31 DIAGNOSIS — O26.899 OTHER SPECIFIED PREGNANCY RELATED CONDITIONS, UNSPECIFIED TRIMESTER: ICD-10-CM

## 2021-08-31 DIAGNOSIS — O99.210 OBESITY COMPLICATING PREGNANCY, UNSPECIFIED TRIMESTER: ICD-10-CM

## 2021-08-31 DIAGNOSIS — Z3A.00 WEEKS OF GESTATION OF PREGNANCY NOT SPECIFIED: ICD-10-CM

## 2021-08-31 DIAGNOSIS — E11.9 TYPE 2 DIABETES MELLITUS WITHOUT COMPLICATIONS: ICD-10-CM

## 2021-08-31 DIAGNOSIS — Z34.80 ENCOUNTER FOR SUPERVISION OF OTHER NORMAL PREGNANCY, UNSPECIFIED TRIMESTER: ICD-10-CM

## 2021-08-31 DIAGNOSIS — O09.899 SUPERVISION OF OTHER HIGH RISK PREGNANCIES, UNSPECIFIED TRIMESTER: ICD-10-CM

## 2021-08-31 DIAGNOSIS — O21.9 VOMITING OF PREGNANCY, UNSPECIFIED: ICD-10-CM

## 2021-08-31 DIAGNOSIS — O09.90 SUPERVISION OF HIGH RISK PREGNANCY, UNSPECIFIED, UNSPECIFIED TRIMESTER: ICD-10-CM

## 2021-08-31 LAB
ALBUMIN SERPL ELPH-MCNC: 3.6 G/DL — SIGNIFICANT CHANGE UP (ref 3.3–5)
ALP SERPL-CCNC: 185 U/L — HIGH (ref 40–120)
ALT FLD-CCNC: <5 U/L — LOW (ref 10–45)
AMYLASE P1 CFR SERPL: 48 U/L — SIGNIFICANT CHANGE UP (ref 25–125)
ANION GAP SERPL CALC-SCNC: 15 MMOL/L — SIGNIFICANT CHANGE UP (ref 5–17)
APTT BLD: 27.8 SEC — SIGNIFICANT CHANGE UP (ref 27.5–35.5)
AST SERPL-CCNC: 14 U/L — SIGNIFICANT CHANGE UP (ref 10–40)
B-OH-BUTYR SERPL-SCNC: 0.5 MMOL/L — HIGH
BASOPHILS # BLD AUTO: 0.02 K/UL — SIGNIFICANT CHANGE UP (ref 0–0.2)
BASOPHILS # BLD AUTO: 0.03 K/UL — SIGNIFICANT CHANGE UP (ref 0–0.2)
BASOPHILS NFR BLD AUTO: 0.2 % — SIGNIFICANT CHANGE UP (ref 0–2)
BASOPHILS NFR BLD AUTO: 0.3 % — SIGNIFICANT CHANGE UP (ref 0–2)
BILIRUB SERPL-MCNC: 0.3 MG/DL — SIGNIFICANT CHANGE UP (ref 0.2–1.2)
BILIRUB UR QL STRIP: NORMAL
BUN SERPL-MCNC: 7 MG/DL — SIGNIFICANT CHANGE UP (ref 7–23)
CALCIUM SERPL-MCNC: 8.9 MG/DL — SIGNIFICANT CHANGE UP (ref 8.4–10.5)
CHLORIDE SERPL-SCNC: 103 MMOL/L — SIGNIFICANT CHANGE UP (ref 96–108)
CO2 SERPL-SCNC: 20 MMOL/L — LOW (ref 22–31)
CREAT SERPL-MCNC: 0.54 MG/DL — SIGNIFICANT CHANGE UP (ref 0.5–1.3)
EOSINOPHIL # BLD AUTO: 0.04 K/UL — SIGNIFICANT CHANGE UP (ref 0–0.5)
EOSINOPHIL # BLD AUTO: 0.05 K/UL — SIGNIFICANT CHANGE UP (ref 0–0.5)
EOSINOPHIL NFR BLD AUTO: 0.4 % — SIGNIFICANT CHANGE UP (ref 0–6)
EOSINOPHIL NFR BLD AUTO: 0.5 % — SIGNIFICANT CHANGE UP (ref 0–6)
FIBRINOGEN PPP-MCNC: 583 MG/DL — HIGH (ref 290–520)
GLUCOSE BLDC GLUCOMTR-MCNC: 78 MG/DL — SIGNIFICANT CHANGE UP (ref 70–99)
GLUCOSE BLDC GLUCOMTR-MCNC: 80 MG/DL — SIGNIFICANT CHANGE UP (ref 70–99)
GLUCOSE SERPL-MCNC: 71 MG/DL — SIGNIFICANT CHANGE UP (ref 70–99)
GLUCOSE UR-MCNC: NORMAL
HCG UR QL: 0.2 EU/DL
HCT VFR BLD CALC: 35.4 % — SIGNIFICANT CHANGE UP (ref 34.5–45)
HCT VFR BLD CALC: 36.5 % — SIGNIFICANT CHANGE UP (ref 34.5–45)
HGB BLD-MCNC: 11.5 G/DL — SIGNIFICANT CHANGE UP (ref 11.5–15.5)
HGB BLD-MCNC: 11.6 G/DL — SIGNIFICANT CHANGE UP (ref 11.5–15.5)
HGB UR QL STRIP.AUTO: NORMAL
IMM GRANULOCYTES NFR BLD AUTO: 0.5 % — SIGNIFICANT CHANGE UP (ref 0–1.5)
IMM GRANULOCYTES NFR BLD AUTO: 0.6 % — SIGNIFICANT CHANGE UP (ref 0–1.5)
INR BLD: 0.93 RATIO — SIGNIFICANT CHANGE UP (ref 0.88–1.16)
KETONES UR-MCNC: NORMAL
LEUKOCYTE ESTERASE UR QL STRIP: NORMAL
LIDOCAIN IGE QN: 25 U/L — SIGNIFICANT CHANGE UP (ref 7–60)
LYMPHOCYTES # BLD AUTO: 19.6 % — SIGNIFICANT CHANGE UP (ref 13–44)
LYMPHOCYTES # BLD AUTO: 2.02 K/UL — SIGNIFICANT CHANGE UP (ref 1–3.3)
LYMPHOCYTES # BLD AUTO: 2.59 K/UL — SIGNIFICANT CHANGE UP (ref 1–3.3)
LYMPHOCYTES # BLD AUTO: 25.9 % — SIGNIFICANT CHANGE UP (ref 13–44)
MCHC RBC-ENTMCNC: 26.9 PG — LOW (ref 27–34)
MCHC RBC-ENTMCNC: 27.8 PG — SIGNIFICANT CHANGE UP (ref 27–34)
MCHC RBC-ENTMCNC: 31.8 GM/DL — LOW (ref 32–36)
MCHC RBC-ENTMCNC: 32.5 GM/DL — SIGNIFICANT CHANGE UP (ref 32–36)
MCV RBC AUTO: 84.5 FL — SIGNIFICANT CHANGE UP (ref 80–100)
MCV RBC AUTO: 85.7 FL — SIGNIFICANT CHANGE UP (ref 80–100)
MONOCYTES # BLD AUTO: 0.54 K/UL — SIGNIFICANT CHANGE UP (ref 0–0.9)
MONOCYTES # BLD AUTO: 0.57 K/UL — SIGNIFICANT CHANGE UP (ref 0–0.9)
MONOCYTES NFR BLD AUTO: 5.4 % — SIGNIFICANT CHANGE UP (ref 2–14)
MONOCYTES NFR BLD AUTO: 5.5 % — SIGNIFICANT CHANGE UP (ref 2–14)
NEUTROPHILS # BLD AUTO: 6.72 K/UL — SIGNIFICANT CHANGE UP (ref 1.8–7.4)
NEUTROPHILS # BLD AUTO: 7.6 K/UL — HIGH (ref 1.8–7.4)
NEUTROPHILS NFR BLD AUTO: 67.3 % — SIGNIFICANT CHANGE UP (ref 43–77)
NEUTROPHILS NFR BLD AUTO: 73.8 % — SIGNIFICANT CHANGE UP (ref 43–77)
NITRITE UR QL STRIP: NORMAL
NRBC # BLD: 0 /100 WBCS — SIGNIFICANT CHANGE UP (ref 0–0)
NRBC # BLD: 0 /100 WBCS — SIGNIFICANT CHANGE UP (ref 0–0)
PH UR STRIP: 7
PLATELET # BLD AUTO: 223 K/UL — SIGNIFICANT CHANGE UP (ref 150–400)
PLATELET # BLD AUTO: 228 K/UL — SIGNIFICANT CHANGE UP (ref 150–400)
POTASSIUM SERPL-MCNC: 4.1 MMOL/L — SIGNIFICANT CHANGE UP (ref 3.5–5.3)
POTASSIUM SERPL-SCNC: 4.1 MMOL/L — SIGNIFICANT CHANGE UP (ref 3.5–5.3)
PROT SERPL-MCNC: 6.6 G/DL — SIGNIFICANT CHANGE UP (ref 6–8.3)
PROT UR STRIP-MCNC: NORMAL
PROTHROM AB SERPL-ACNC: 11.2 SEC — SIGNIFICANT CHANGE UP (ref 10.6–13.6)
RAPID RVP RESULT: SIGNIFICANT CHANGE UP
RBC # BLD: 4.13 M/UL — SIGNIFICANT CHANGE UP (ref 3.8–5.2)
RBC # BLD: 4.32 M/UL — SIGNIFICANT CHANGE UP (ref 3.8–5.2)
RBC # FLD: 13 % — SIGNIFICANT CHANGE UP (ref 10.3–14.5)
RBC # FLD: 13.1 % — SIGNIFICANT CHANGE UP (ref 10.3–14.5)
SARS-COV-2 RNA SPEC QL NAA+PROBE: SIGNIFICANT CHANGE UP
SODIUM SERPL-SCNC: 138 MMOL/L — SIGNIFICANT CHANGE UP (ref 135–145)
SP GR UR STRIP: 1.02
WBC # BLD: 10.3 K/UL — SIGNIFICANT CHANGE UP (ref 3.8–10.5)
WBC # BLD: 9.99 K/UL — SIGNIFICANT CHANGE UP (ref 3.8–10.5)
WBC # FLD AUTO: 10.3 K/UL — SIGNIFICANT CHANGE UP (ref 3.8–10.5)
WBC # FLD AUTO: 9.99 K/UL — SIGNIFICANT CHANGE UP (ref 3.8–10.5)

## 2021-08-31 PROCEDURE — 76705 ECHO EXAM OF ABDOMEN: CPT | Mod: 26

## 2021-08-31 PROCEDURE — 76818 FETAL BIOPHYS PROFILE W/NST: CPT

## 2021-08-31 PROCEDURE — 81003 URINALYSIS AUTO W/O SCOPE: CPT

## 2021-08-31 PROCEDURE — 99213 OFFICE O/P EST LOW 20 MIN: CPT | Mod: GC,25

## 2021-08-31 PROCEDURE — G0463: CPT

## 2021-08-31 PROCEDURE — 76818 FETAL BIOPHYS PROFILE W/NST: CPT | Mod: 26

## 2021-08-31 RX ORDER — OXYCODONE HYDROCHLORIDE 5 MG/1
10 TABLET ORAL
Refills: 0 | Status: DISCONTINUED | OUTPATIENT
Start: 2021-09-01 | End: 2021-09-01

## 2021-08-31 RX ORDER — CITRIC ACID/SODIUM CITRATE 300-500 MG
30 SOLUTION, ORAL ORAL ONCE
Refills: 0 | Status: DISCONTINUED | OUTPATIENT
Start: 2021-08-31 | End: 2021-09-01

## 2021-08-31 RX ORDER — SODIUM CHLORIDE 9 MG/ML
1000 INJECTION, SOLUTION INTRAVENOUS ONCE
Refills: 0 | Status: DISCONTINUED | OUTPATIENT
Start: 2021-08-31 | End: 2021-09-01

## 2021-08-31 RX ORDER — OXYCODONE HYDROCHLORIDE 5 MG/1
5 TABLET ORAL
Refills: 0 | Status: DISCONTINUED | OUTPATIENT
Start: 2021-09-01 | End: 2021-09-01

## 2021-08-31 RX ORDER — NALOXONE HYDROCHLORIDE 4 MG/.1ML
0.1 SPRAY NASAL
Refills: 0 | Status: DISCONTINUED | OUTPATIENT
Start: 2021-09-01 | End: 2021-09-03

## 2021-08-31 RX ORDER — FAMOTIDINE 10 MG/ML
20 INJECTION INTRAVENOUS ONCE
Refills: 0 | Status: DISCONTINUED | OUTPATIENT
Start: 2021-08-31 | End: 2021-09-01

## 2021-08-31 RX ORDER — ONDANSETRON 8 MG/1
4 TABLET, FILM COATED ORAL EVERY 6 HOURS
Refills: 0 | Status: DISCONTINUED | OUTPATIENT
Start: 2021-09-01 | End: 2021-09-03

## 2021-08-31 RX ORDER — SODIUM CHLORIDE 9 MG/ML
1000 INJECTION, SOLUTION INTRAVENOUS
Refills: 0 | Status: DISCONTINUED | OUTPATIENT
Start: 2021-08-31 | End: 2021-09-01

## 2021-08-31 RX ORDER — SODIUM CHLORIDE 9 MG/ML
1000 INJECTION INTRAMUSCULAR; INTRAVENOUS; SUBCUTANEOUS
Refills: 0 | Status: DISCONTINUED | OUTPATIENT
Start: 2021-08-31 | End: 2021-09-03

## 2021-08-31 RX ORDER — OXYTOCIN 10 UNIT/ML
333.33 VIAL (ML) INJECTION
Qty: 20 | Refills: 0 | Status: DISCONTINUED | OUTPATIENT
Start: 2021-08-31 | End: 2021-09-03

## 2021-08-31 RX ORDER — MORPHINE SULFATE 50 MG/1
0.1 CAPSULE, EXTENDED RELEASE ORAL ONCE
Refills: 0 | Status: DISCONTINUED | OUTPATIENT
Start: 2021-09-01 | End: 2021-09-02

## 2021-08-31 RX ORDER — METFORMIN HYDROCHLORIDE 850 MG/1
1000 TABLET ORAL ONCE
Refills: 0 | Status: DISCONTINUED | OUTPATIENT
Start: 2021-08-31 | End: 2021-09-01

## 2021-08-31 RX ORDER — DEXAMETHASONE 0.5 MG/5ML
4 ELIXIR ORAL EVERY 6 HOURS
Refills: 0 | Status: DISCONTINUED | OUTPATIENT
Start: 2021-09-01 | End: 2021-09-03

## 2021-08-31 NOTE — OB PROVIDER H&P - HISTORY OF PRESENT ILLNESS
36 yo  at 38.0 wks gestational age referred to OB triage from [facility] appointment today due to 4 days of persistent nausea, vomiting, and abdominal pain w/ minimal PO intake.  Patient reports 2 episodes of nonbloody nonbilious emesis today.  Last PO intake at 1230 pm which patient kept down.  Reports currently feeling nauseous.  Patient also endorses lightheadedness.  Patient states has had less fetal movement since onset of symptoms.  Denies VB, LOF, regular ctx.    PNC: DM2 referred to [facility].  On NPH 27u QHS, metformin 1000 BID w/ poor glycemic control, FS reviewed in clinic w/ almost all elevated.  Fetal echo wnl.  Maternal echo wnl  w/ EF of 65%    ATU sonos:   BPP 8/8, DEQUAN 12.5, vertex   vertex, posterior placenta, DEQUAN 10.3, EFW 2571g    Obhx: pLTCS NRFHT 2016 does not know weight but reports large, SAB x1  Gynhx: denies   PMH: DM2 dx 2 years ago  PSH: pLTAUBRIE  All: NKDA  Meds: NPH 27u QHS, metformin 1000 BID  Social: denies tobacco, alcohol, recreational drug use

## 2021-08-31 NOTE — OB PROVIDER H&P - ASSESSMENT
37 yo  at 38 wks presenting w/ nausea, vomiting, and abdominal pain.  Lab work wnl.  Abdominal ultrasound w/ normal liver, gallbladder pancreas, appendix could not be visualized.  On repeat exam following ultrasound patient's abdomen found to be acutely tender w/ greatest discomfort at the area of the prior incision concerning for uterine rupture.  Plan for MR abdomen to r/o appendicitis deferred due to greater concern of uterine rupture despite reasurring fetal status.  Ultrasound appears c/w thin layer of myometrium, likely window present.  Patient to go for repeat CS.    -admit to labor and delivery  -routine admission labs  -IV and fluids  -NPO  -consents obtained, including consent for hysterectomy  -2u PRBC on hold  -nexplanon postpartum for contraception  -OR being prepared  -will call surgery intraop consult if continued concerns for appendicitis    d/w Dr. Anthony Elizabeth PGY3  37 yo  at 38 wks presenting w/ nausea, vomiting, and abdominal pain.  Lab work wnl.  Abdominal ultrasound w/ normal liver, gallbladder pancreas, appendix could not be visualized.  On repeat exam following ultrasound patient's abdomen found to be acutely tender w/ greatest discomfort at the area of the prior incision concerning for uterine rupture.  Plan for MR abdomen to r/o appendicitis deferred due to greater concern of uterine rupture despite reassuring fetal status.  Ultrasound appears c/w thin layer of myometrium, likely window present.  Patient to go for repeat CS.    -admit to labor and delivery  -routine admission labs  -IV and fluids  -NPO  -consents obtained, including consent for hysterectomy  -2u PRBC on hold  -nexplanon postpartum for contraception  -OR being prepared  -will call surgery intraop consult if continued concerns for appendicitis    d/w Dr. Anthony Elizabeth PGY3

## 2021-08-31 NOTE — OB PROVIDER TRIAGE NOTE - NS_CHIEFCOMPLAINT_OBGYN_ALL_OB
Patient states Truilicity is too expensive, would like Bydureon to be prescribed. Please advise.    Nausea and/or Vomiting

## 2021-08-31 NOTE — OB PROVIDER TRIAGE NOTE - HISTORY OF PRESENT ILLNESS
34 yo  at 38.0 wks gestational age referred to OB triage from [facility] appointment today due to 4 days of persistent nausea, vomiting, and abdominal pain w/ minimal PO intake.  Patient reports 2 episodes of nonbloody nonbilious emesis today.  Last PO intake at 1230 pm which patient kept down.  Reports currently feeling nauseous.  Patient also endorses lightheadedness.  Patient states has had less fetal movement since onset of symptoms.  Denies VB, LOF, regular ctx.    PNC: DM2 referred to [facility].  On NPH 27u QHS, metformin 1000 BID w/ poor glycemic control, FS reviewed in clinic w/ almost all elevated.  Fetal echo wnl.  Maternal echo wnl  w/ EF of 65%    ATU sonos:   BPP 8/8, DEQUAN 12.5, vertex   vertex, posterior placenta, DEQUAN 10.3, EFW 2571g    Obhx: pLTCS NRFHT 2016 does not know weight but reports large, SAB x1  Gynhx: denies   PMH: DM2 dx 2 years ago  PSH: pLTAUBRIE  All: NKDA  Meds: NPH 27u QHS, metformin 1000 BID  Social: denies tobacco, alcohol, recreational drug use

## 2021-08-31 NOTE — OB PROVIDER TRIAGE NOTE - NSHPPHYSICALEXAM_GEN_ALL_CORE
Vital Signs Last 24 Hrs  T(C): --  T(F): --  HR: 69 (31 Aug 2021 19:56) (65 - 85)  BP: 131/98 (31 Aug 2021 18:49) (131/78 - 131/98)  BP(mean): --  RR: 18 (31 Aug 2021 18:49) (18 - 18)  SpO2: 99% (31 Aug 2021 19:56) (98% - 100%)    Gen: NAD  CV: RRR  Pulm: nonlabored breathing on room air  Abd: soft, tenderness to palpation throughout the abdomen w/ guarding  : deferred VE    FHT: baseline 130, mod awilda, +accels, no decels  [place]: no ctx

## 2021-08-31 NOTE — OB PROVIDER H&P - NSHPPHYSICALEXAM_GEN_ALL_CORE
Vital Signs Last 24 Hrs  T(C): --  T(F): --  HR: 69 (31 Aug 2021 19:56) (65 - 85)  BP: 131/98 (31 Aug 2021 18:49) (131/78 - 131/98)  BP(mean): --  RR: 18 (31 Aug 2021 18:49) (18 - 18)  SpO2: 99% (31 Aug 2021 19:56) (98% - 100%)    Gen: NAD  CV: RRR  Pulm: nonlabored breathing on room air  Abd: soft, tenderness to palpation throughout the abdomen w/ guarding, peritoneal signs  : deferred VE    FHT: baseline 130, mod awilda, +accels, no decels  [place]: no ctx Vital Signs Last 24 Hrs  T(C): --  T(F): --  HR: 69 (31 Aug 2021 19:56) (65 - 85)  BP: 131/98 (31 Aug 2021 18:49) (131/78 - 131/98)  BP(mean): --  RR: 18 (31 Aug 2021 18:49) (18 - 18)  SpO2: 99% (31 Aug 2021 19:56) (98% - 100%)    Gen: NAD  CV: RRR  Pulm: nonlabored breathing on room air  Abd: soft, tenderness to palpation throughout the abdomen w/ guarding, peritoneal signs  : closed cervix     FHT: baseline 130, mod awilda, +accels, no decels  [place]: no ctx

## 2021-08-31 NOTE — OB PROVIDER H&P - ATTENDING COMMENTS
Patient presents with acute complaints of abdominal pain, nausea and vomiting x 4 days. Reports generalized abdominal pain but worsened in RLQ with pressure. Denies fevers, chills or dysuria. Tolerating PO intake and voiding spontaneously.   Also, on presentation patient reported decreased fetal movement but denied vaginal bleeding or leakage of fluid.   Past medical and surgical history reviewed. Denies medication allergies.  Medications reviewed and doses confirmed with patient   Vitals reviewed   Gen: no acute distress   Abd: ++ tenderness noted on lower abdomen at level of CD scar ++ fundal tenderness noted; no rebound or guarding noted   SVE: closed as per resident   EFM: reactive   [place]: no contractions noted   Abdominal sonogram reviewed   GBS positive  A/P: IUP at 38wks, previous CD x1, + tenderness over CD scar, concern for uterine defect   -for repeat abdominal delivery   -consents for blood transfusion   -T&C for 2u pRBC   -continue EFM and toco   -labs and IVF   -COVID testing   -anesthesia and peds informed   -Nexplanon for contraception     patient spoken to using  #[id number]     TAMY Cruz

## 2021-08-31 NOTE — OB PROVIDER TRIAGE NOTE - NSOBPROVIDERNOTE_OBGYN_ALL_OB_FT
34 yo  at 38 wks presenting w/ nausea, vomiting, and abdominal pain and reporting decreased fetal movement.  Differential dx includes DKA, cholecystitis, appendicitis, pancreatitis, uterine rupture.  DKA less likely in the setting of FS of 80 on arrival.    -NST reactive, BPP earlier today at [facility]U  reassuring of fetal status, patient reports feeling fetal movement while at [facility]  -CBC, CMP, amylase, lipase, betahydroxybutyrate pending  -abdominal ultrasound to assess appendix, gallbladder, pancreas  -IV hydration w/ normal saline  -NPO   -finger stick q6h while NPO, rotate fluids per protocol due to DM  -home metformin 1000  -continuous fetal monitoring while on L&D    d/w Dr. Anthony Elizabeth PGY3

## 2021-09-01 LAB
APPEARANCE UR: CLEAR — SIGNIFICANT CHANGE UP
BACTERIA # UR AUTO: NEGATIVE — SIGNIFICANT CHANGE UP
BILIRUB UR-MCNC: NEGATIVE — SIGNIFICANT CHANGE UP
BLD GP AB SCN SERPL QL: NEGATIVE — SIGNIFICANT CHANGE UP
COLOR SPEC: SIGNIFICANT CHANGE UP
COVID-19 SPIKE DOMAIN AB INTERP: POSITIVE
COVID-19 SPIKE DOMAIN ANTIBODY RESULT: 91.3 U/ML — HIGH
DIFF PNL FLD: ABNORMAL
EPI CELLS # UR: 6 /HPF — HIGH
GLUCOSE BLDC GLUCOMTR-MCNC: 104 MG/DL — HIGH (ref 70–99)
GLUCOSE BLDC GLUCOMTR-MCNC: 112 MG/DL — HIGH (ref 70–99)
GLUCOSE BLDC GLUCOMTR-MCNC: 131 MG/DL — HIGH (ref 70–99)
GLUCOSE BLDC GLUCOMTR-MCNC: 93 MG/DL — SIGNIFICANT CHANGE UP (ref 70–99)
GLUCOSE BLDC GLUCOMTR-MCNC: 98 MG/DL — SIGNIFICANT CHANGE UP (ref 70–99)
GLUCOSE UR QL: NEGATIVE — SIGNIFICANT CHANGE UP
HYALINE CASTS # UR AUTO: 12 /LPF — HIGH (ref 0–2)
KETONES UR-MCNC: ABNORMAL
LEUKOCYTE ESTERASE UR-ACNC: NEGATIVE — SIGNIFICANT CHANGE UP
NITRITE UR-MCNC: NEGATIVE — SIGNIFICANT CHANGE UP
PH UR: 6.5 — SIGNIFICANT CHANGE UP (ref 5–8)
PROT UR-MCNC: ABNORMAL
RBC CASTS # UR COMP ASSIST: 53 /HPF — HIGH (ref 0–4)
RH IG SCN BLD-IMP: POSITIVE — SIGNIFICANT CHANGE UP
SARS-COV-2 IGG+IGM SERPL QL IA: 91.3 U/ML — HIGH
SARS-COV-2 IGG+IGM SERPL QL IA: POSITIVE
SARS-COV-2 RNA SPEC QL NAA+PROBE: SIGNIFICANT CHANGE UP
SP GR SPEC: 1.02 — SIGNIFICANT CHANGE UP (ref 1.01–1.02)
T PALLIDUM AB TITR SER: NEGATIVE — SIGNIFICANT CHANGE UP
UROBILINOGEN FLD QL: NEGATIVE — SIGNIFICANT CHANGE UP
WBC UR QL: 4 /HPF — SIGNIFICANT CHANGE UP (ref 0–5)

## 2021-09-01 PROCEDURE — 59514 CESAREAN DELIVERY ONLY: CPT | Mod: U9

## 2021-09-01 PROCEDURE — 88307 TISSUE EXAM BY PATHOLOGIST: CPT | Mod: 26

## 2021-09-01 RX ORDER — GLUCAGON INJECTION, SOLUTION 0.5 MG/.1ML
1 INJECTION, SOLUTION SUBCUTANEOUS ONCE
Refills: 0 | Status: DISCONTINUED | OUTPATIENT
Start: 2021-09-01 | End: 2021-09-03

## 2021-09-01 RX ORDER — OXYCODONE HYDROCHLORIDE 5 MG/1
5 TABLET ORAL
Refills: 0 | Status: DISCONTINUED | OUTPATIENT
Start: 2021-09-01 | End: 2021-09-03

## 2021-09-01 RX ORDER — KETOROLAC TROMETHAMINE 30 MG/ML
30 SYRINGE (ML) INJECTION EVERY 6 HOURS
Refills: 0 | Status: DISCONTINUED | OUTPATIENT
Start: 2021-09-01 | End: 2021-09-01

## 2021-09-01 RX ORDER — DEXTROSE 50 % IN WATER 50 %
25 SYRINGE (ML) INTRAVENOUS ONCE
Refills: 0 | Status: DISCONTINUED | OUTPATIENT
Start: 2021-09-01 | End: 2021-09-03

## 2021-09-01 RX ORDER — LANOLIN
1 OINTMENT (GRAM) TOPICAL EVERY 6 HOURS
Refills: 0 | Status: DISCONTINUED | OUTPATIENT
Start: 2021-09-01 | End: 2021-09-03

## 2021-09-01 RX ORDER — TETANUS TOXOID, REDUCED DIPHTHERIA TOXOID AND ACELLULAR PERTUSSIS VACCINE, ADSORBED 5; 2.5; 8; 8; 2.5 [IU]/.5ML; [IU]/.5ML; UG/.5ML; UG/.5ML; UG/.5ML
0.5 SUSPENSION INTRAMUSCULAR ONCE
Refills: 0 | Status: DISCONTINUED | OUTPATIENT
Start: 2021-09-01 | End: 2021-09-03

## 2021-09-01 RX ORDER — OXYCODONE HYDROCHLORIDE 5 MG/1
5 TABLET ORAL ONCE
Refills: 0 | Status: DISCONTINUED | OUTPATIENT
Start: 2021-09-01 | End: 2021-09-03

## 2021-09-01 RX ORDER — SODIUM CHLORIDE 9 MG/ML
1000 INJECTION, SOLUTION INTRAVENOUS
Refills: 0 | Status: DISCONTINUED | OUTPATIENT
Start: 2021-09-01 | End: 2021-09-03

## 2021-09-01 RX ORDER — MAGNESIUM HYDROXIDE 400 MG/1
30 TABLET, CHEWABLE ORAL
Refills: 0 | Status: DISCONTINUED | OUTPATIENT
Start: 2021-09-01 | End: 2021-09-03

## 2021-09-01 RX ORDER — DIPHENHYDRAMINE HCL 50 MG
25 CAPSULE ORAL EVERY 6 HOURS
Refills: 0 | Status: DISCONTINUED | OUTPATIENT
Start: 2021-09-01 | End: 2021-09-03

## 2021-09-01 RX ORDER — ACETAMINOPHEN 500 MG
975 TABLET ORAL
Refills: 0 | Status: DISCONTINUED | OUTPATIENT
Start: 2021-09-01 | End: 2021-09-03

## 2021-09-01 RX ORDER — DEXTROSE 50 % IN WATER 50 %
12.5 SYRINGE (ML) INTRAVENOUS ONCE
Refills: 0 | Status: DISCONTINUED | OUTPATIENT
Start: 2021-09-01 | End: 2021-09-03

## 2021-09-01 RX ORDER — IBUPROFEN 200 MG
600 TABLET ORAL EVERY 6 HOURS
Refills: 0 | Status: COMPLETED | OUTPATIENT
Start: 2021-09-01 | End: 2022-07-31

## 2021-09-01 RX ORDER — SIMETHICONE 80 MG/1
80 TABLET, CHEWABLE ORAL EVERY 4 HOURS
Refills: 0 | Status: DISCONTINUED | OUTPATIENT
Start: 2021-09-01 | End: 2021-09-03

## 2021-09-01 RX ORDER — INSULIN LISPRO 100/ML
VIAL (ML) SUBCUTANEOUS
Refills: 0 | Status: DISCONTINUED | OUTPATIENT
Start: 2021-09-01 | End: 2021-09-03

## 2021-09-01 RX ORDER — GENTAMICIN SULFATE 40 MG/ML
315 VIAL (ML) INJECTION ONCE
Refills: 0 | Status: COMPLETED | OUTPATIENT
Start: 2021-09-01 | End: 2021-09-01

## 2021-09-01 RX ORDER — DEXTROSE 50 % IN WATER 50 %
15 SYRINGE (ML) INTRAVENOUS ONCE
Refills: 0 | Status: DISCONTINUED | OUTPATIENT
Start: 2021-09-01 | End: 2021-09-03

## 2021-09-01 RX ORDER — OXYCODONE HYDROCHLORIDE 5 MG/1
5 TABLET ORAL
Refills: 0 | Status: COMPLETED | OUTPATIENT
Start: 2021-09-01 | End: 2021-09-08

## 2021-09-01 RX ADMIN — Medication 30 MILLIGRAM(S): at 12:25

## 2021-09-01 RX ADMIN — Medication 30 MILLIGRAM(S): at 13:00

## 2021-09-01 RX ADMIN — Medication 975 MILLIGRAM(S): at 16:23

## 2021-09-01 RX ADMIN — Medication 100 MILLIGRAM(S): at 02:03

## 2021-09-01 RX ADMIN — Medication 975 MILLIGRAM(S): at 09:06

## 2021-09-01 RX ADMIN — Medication 100 MILLIGRAM(S): at 15:16

## 2021-09-01 RX ADMIN — Medication 100 MILLIGRAM(S): at 21:43

## 2021-09-01 RX ADMIN — Medication 30 MILLIGRAM(S): at 18:38

## 2021-09-01 RX ADMIN — Medication 975 MILLIGRAM(S): at 20:34

## 2021-09-01 RX ADMIN — Medication 975 MILLIGRAM(S): at 17:22

## 2021-09-01 RX ADMIN — Medication 975 MILLIGRAM(S): at 20:20

## 2021-09-01 RX ADMIN — Medication 100 MILLIGRAM(S): at 01:37

## 2021-09-01 RX ADMIN — Medication 30 MILLIGRAM(S): at 19:27

## 2021-09-01 RX ADMIN — Medication 30 MILLIGRAM(S): at 05:43

## 2021-09-01 RX ADMIN — Medication 30 MILLIGRAM(S): at 23:18

## 2021-09-01 RX ADMIN — Medication 30 MILLIGRAM(S): at 06:40

## 2021-09-01 RX ADMIN — Medication 30 MILLIGRAM(S): at 23:28

## 2021-09-01 RX ADMIN — Medication 975 MILLIGRAM(S): at 04:50

## 2021-09-01 RX ADMIN — Medication 975 MILLIGRAM(S): at 10:00

## 2021-09-01 NOTE — PROGRESS NOTE ADULT - SUBJECTIVE AND OBJECTIVE BOX
Day 1 of Anesthesia Pain Management Service    SUBJECTIVE: Doing ok  Pain Scale Score:          [X] Refer to charted pain scores    THERAPY:    s/p   100  mcg PF morphine on 8\31\2021      MEDICATIONS  (STANDING):  acetaminophen   Tablet .. 975 milliGRAM(s) Oral <User Schedule>  clindamycin IVPB 900 milliGRAM(s) IV Intermittent every 8 hours  dextrose 40% Gel 15 Gram(s) Oral once  dextrose 5%. 1000 milliLiter(s) (50 mL/Hr) IV Continuous <Continuous>  dextrose 5%. 1000 milliLiter(s) (100 mL/Hr) IV Continuous <Continuous>  dextrose 50% Injectable 25 Gram(s) IV Push once  dextrose 50% Injectable 12.5 Gram(s) IV Push once  dextrose 50% Injectable 25 Gram(s) IV Push once  diphtheria/tetanus/pertussis (acellular) Vaccine (ADAcel) 0.5 milliLiter(s) IntraMuscular once  glucagon  Injectable 1 milliGRAM(s) IntraMuscular once  ibuprofen  Tablet. 600 milliGRAM(s) Oral every 6 hours  insulin lispro (ADMELOG) corrective regimen sliding scale   SubCutaneous three times a day before meals  ketorolac   Injectable 30 milliGRAM(s) IV Push every 6 hours  lactated ringers. 1000 milliLiter(s) (125 mL/Hr) IV Continuous <Continuous>  morphine PF Spinal 0.1 milliGRAM(s) IntraThecal. once  oxytocin Infusion 333.333 milliUNIT(s)/Min (1000 mL/Hr) IV Continuous <Continuous>  sodium chloride 0.9%. 1000 milliLiter(s) (125 mL/Hr) IV Continuous <Continuous>    MEDICATIONS  (PRN):  dexAMETHasone  Injectable 4 milliGRAM(s) IV Push every 6 hours PRN Nausea  diphenhydrAMINE 25 milliGRAM(s) Oral every 6 hours PRN Pruritus  lanolin Ointment 1 Application(s) Topical every 6 hours PRN Sore Nipples  magnesium hydroxide Suspension 30 milliLiter(s) Oral two times a day PRN Constipation  naloxone Injectable 0.1 milliGRAM(s) IV Push every 3 minutes PRN For ANY of the following changes in patient status:  A. Breaths Per Minute LESS THAN 10, B. Oxygen saturation LESS THAN 90%, C. Sedation score of 6 for Stop After: 4 Times  ondansetron Injectable 4 milliGRAM(s) IV Push every 6 hours PRN Nausea  oxyCODONE    IR 5 milliGRAM(s) Oral every 3 hours PRN Mild Pain (1 - 3)  oxyCODONE    IR 10 milliGRAM(s) Oral every 3 hours PRN Moderate Pain (4 - 6)  oxyCODONE    IR 5 milliGRAM(s) Oral every 3 hours PRN Moderate to Severe Pain (4-10)  oxyCODONE    IR 5 milliGRAM(s) Oral once PRN Moderate to Severe Pain (4-10)  simethicone 80 milliGRAM(s) Chew every 4 hours PRN Gas      OBJECTIVE:    Sedation:        	[X] Alert	 [ ] Drowsy	[ ] Arousable      [ ] Asleep       [ ] Unresponsive    Side Effects:	[ ] None 	[ ] Nausea	[ ] Vomiting         [ ] Pruritus  		[ ] Weakness            [X ] Numbness: Rt lower extremity- good motor strength	          [ ] Other:    Vital Signs Last 24 Hrs  T(C): 36.9 (01 Sep 2021 09:28), Max: 37.2 (31 Aug 2021 23:35)  T(F): 98.5 (01 Sep 2021 09:28), Max: 98.96 (31 Aug 2021 23:35)  HR: 80 (01 Sep 2021 09:28) (56 - 91)  BP: 105/67 (01 Sep 2021 09:28) (103/59 - 131/98)  BP(mean): 79 (01 Sep 2021 04:23) (75 - 85)  RR: 17 (01 Sep 2021 09:28) (16 - 18)  SpO2: 98% (01 Sep 2021 09:28) (92% - 100%)    ASSESSMENT/ PLAN  [X] Patient transitioned to prn analgesics  [X] Pain management per primary service, pain service to sign off   [X]Documentation and Verification of current medications

## 2021-09-01 NOTE — OB PROVIDER DELIVERY SUMMARY - NSPROVIDERDELIVERYNOTE_OBGYN_ALL_OB_FT
Patient admitted due to nausea, vomiting, and abdominal pain.  Taken for rLTCS due to concern of uterine rupture. Live female  delivery via lower uterine segment incision.  Uterus thin but intact without window.  Apgars 8/9. Weight= 2968g, cephalic presentation. Cord clamped and cut. Cord gas sent. Placenta delivered intact. Hysterotomy closed with vicryl in 1 layer w/ a second imbricating layer of caprosyn. Appendix visualized appeared normal.  Liver and diaphragm palpated w/o studding. Peritoneum closed w/ vicryl. Fascia closed with 0-vicryl. Skin closed with monocryl. QBL 326cc, IVF 2200 mL, UOP 400cc. Patient admitted due to nausea, vomiting, and abdominal pain.    Taken for rLTCS due to concern of uterine rupture. Live female  delivery via lower uterine segment incision.  Lower uterine segment thin but intact without window.  Apgars 8/9. Weight= 2968g, cephalic presentation. Cord clamped and cut. Cord gas sent. Placenta delivered intact. Hysterotomy closed with vicryl in 1 layer w/ a second imbricating layer of caprosyn. Appendix visualized appeared normal.  Liver and diaphragm palpated w/o studding. Peritoneum closed w/ vicryl. Fascia closed with 0-vicryl. Skin closed with monocryl. QBL 326cc, IVF 2200 mL, UOP 400cc. Patient admitted due to nausea, vomiting, and abdominal pain.    Taken for rLTCS due to concern of uterine rupture. Live female  delivery via lower uterine segment incision.  Lower uterine segment thin but intact without window. Upon hysterotomy, foul smelling fluid noted.   Apgars 8/9. Weight= 2968g, cephalic presentation. Cord clamped and cut. Cord gas sent. Placenta delivered intact. Hysterotomy closed with vicryl in 1 layer w/ a second imbricating layer of caprosyn. Appendix visualized appeared normal.  Liver and diaphragm palpated w/o studding. Peritoneum closed w/ vicryl. Fascia closed with 0-vicryl. Skin closed with monocryl. QBL 326cc, IVF 2200 mL, UOP 400cc.

## 2021-09-01 NOTE — PROGRESS NOTE ADULT - SUBJECTIVE AND OBJECTIVE BOX
OB Postpartum Note:  Delivery    S: 35yo now POD #1 s/p LTCS. Her pain is well controlled. She is tolerating a regular diet but has not yet passed flatus. Voiding spontaneously and ambulating without difficulty. Denies N/V. Denies CP/SOB/lightheadedness/dizziness.     O:   Vitals:  Vital Signs Last 24 Hrs  T(C): 36.5 (01 Sep 2021 05:45), Max: 37.2 (31 Aug 2021 23:35)  T(F): 97.7 (01 Sep 2021 05:45), Max: 98.96 (31 Aug 2021 23:35)  HR: 58 (01 Sep 2021 05:45) (56 - 91)  BP: 104/67 (01 Sep 2021 05:45) (103/59 - 131/98)  BP(mean): 79 (01 Sep 2021 04:23) (75 - 85)  RR: 17 (01 Sep 2021 05:45) (16 - 18)  SpO2: 97% (01 Sep 2021 05:45) (92% - 100%)    MEDICATIONS  (STANDING):  acetaminophen   Tablet .. 975 milliGRAM(s) Oral <User Schedule>  clindamycin IVPB 900 milliGRAM(s) IV Intermittent every 8 hours  dextrose 40% Gel 15 Gram(s) Oral once  dextrose 5%. 1000 milliLiter(s) (50 mL/Hr) IV Continuous <Continuous>  dextrose 5%. 1000 milliLiter(s) (100 mL/Hr) IV Continuous <Continuous>  dextrose 50% Injectable 25 Gram(s) IV Push once  dextrose 50% Injectable 12.5 Gram(s) IV Push once  dextrose 50% Injectable 25 Gram(s) IV Push once  diphtheria/tetanus/pertussis (acellular) Vaccine (ADAcel) 0.5 milliLiter(s) IntraMuscular once  glucagon  Injectable 1 milliGRAM(s) IntraMuscular once  ibuprofen  Tablet. 600 milliGRAM(s) Oral every 6 hours  insulin lispro (ADMELOG) corrective regimen sliding scale   SubCutaneous three times a day before meals  ketorolac   Injectable 30 milliGRAM(s) IV Push every 6 hours  lactated ringers. 1000 milliLiter(s) (125 mL/Hr) IV Continuous <Continuous>  morphine PF Spinal 0.1 milliGRAM(s) IntraThecal. once  oxytocin Infusion 333.333 milliUNIT(s)/Min (1000 mL/Hr) IV Continuous <Continuous>  sodium chloride 0.9%. 1000 milliLiter(s) (125 mL/Hr) IV Continuous <Continuous>    MEDICATIONS  (PRN):  dexAMETHasone  Injectable 4 milliGRAM(s) IV Push every 6 hours PRN Nausea  diphenhydrAMINE 25 milliGRAM(s) Oral every 6 hours PRN Pruritus  lanolin Ointment 1 Application(s) Topical every 6 hours PRN Sore Nipples  magnesium hydroxide Suspension 30 milliLiter(s) Oral two times a day PRN Constipation  naloxone Injectable 0.1 milliGRAM(s) IV Push every 3 minutes PRN For ANY of the following changes in patient status:  A. Breaths Per Minute LESS THAN 10, B. Oxygen saturation LESS THAN 90%, C. Sedation score of 6 for Stop After: 4 Times  ondansetron Injectable 4 milliGRAM(s) IV Push every 6 hours PRN Nausea  oxyCODONE    IR 5 milliGRAM(s) Oral every 3 hours PRN Mild Pain (1 - 3)  oxyCODONE    IR 10 milliGRAM(s) Oral every 3 hours PRN Moderate Pain (4 - 6)  oxyCODONE    IR 5 milliGRAM(s) Oral every 3 hours PRN Moderate to Severe Pain (4-10)  oxyCODONE    IR 5 milliGRAM(s) Oral once PRN Moderate to Severe Pain (4-10)  simethicone 80 milliGRAM(s) Chew every 4 hours PRN Gas      Labs:  Blood type: O Positive  Rubella IgG: RPR: Negative                          11.5   9.99 >-----------< 223    (  @ 23:17 )             35.4                        11.6   10.30 >-----------< 228    (  @ 21:10 )             36.5    21 @ 21:10      138  |  103  |  7   ----------------------------<  71  4.1   |  20<L>  |  0.54        Ca    8.9      31 Aug 2021 21:10    TPro  6.6  /  Alb  3.6  /  TBili  0.3  /  DBili  x   /  AST  14  /  ALT  <5<L>  /  AlkPhos  185<H>  21 @ 21:10          PE:  General: NAD, patient resting comfortably in bed  Abdomen: Mildly distended, appropriately tender  Incision: Clean, dry, intact.  Extremities: SCDs in place, no erythema

## 2021-09-01 NOTE — OB RN DELIVERY SUMMARY - NS_GROUP_OBGYN_ALL_OB_FT
Patient called to inform she is unable to press option 1 to speak to Aries Perez . She is asking for a returned call. She reports that she is unable to sleep and wants to discuss medications.
HRC

## 2021-09-01 NOTE — OB RN DELIVERY SUMMARY - NSSELHIDDEN_OBGYN_ALL_OB_FT
[NS_DeliveryAttending1_OBGYN_ALL_OB_FT:[id number]=],[NS_DeliveryAssist1_OBGYN_ALL_OB_FT:[id number]=],[NS_DeliveryRN_OBGYN_ALL_OB_FT:[id number]=]

## 2021-09-01 NOTE — PROVIDER CONTACT NOTE (MEDICATION) - ASSESSMENT
patient A&Ox4 laying comfortably in bed s/p repeat c section with  in L&D PACU. patient reports she did not take her evening dose of PO Metformin, pt reports last taking it at 10AM 8/31. MD Song notified.

## 2021-09-01 NOTE — PROGRESS NOTE ADULT - SUBJECTIVE AND OBJECTIVE BOX
Day 1 of Anesthesia Pain Management Service    SUBJECTIVE:  Pain Scale Score:          [X] Refer to charted pain scores    THERAPY:    s/p 0.1mg PF morphine on 8/31/21      MEDICATIONS  (STANDING):  acetaminophen   Tablet .. 975 milliGRAM(s) Oral <User Schedule>  clindamycin IVPB 900 milliGRAM(s) IV Intermittent every 8 hours  dextrose 40% Gel 15 Gram(s) Oral once  dextrose 5%. 1000 milliLiter(s) (50 mL/Hr) IV Continuous <Continuous>  dextrose 5%. 1000 milliLiter(s) (100 mL/Hr) IV Continuous <Continuous>  dextrose 50% Injectable 25 Gram(s) IV Push once  dextrose 50% Injectable 12.5 Gram(s) IV Push once  dextrose 50% Injectable 25 Gram(s) IV Push once  diphtheria/tetanus/pertussis (acellular) Vaccine (ADAcel) 0.5 milliLiter(s) IntraMuscular once  glucagon  Injectable 1 milliGRAM(s) IntraMuscular once  ibuprofen  Tablet. 600 milliGRAM(s) Oral every 6 hours  insulin lispro (ADMELOG) corrective regimen sliding scale   SubCutaneous three times a day before meals  ketorolac   Injectable 30 milliGRAM(s) IV Push every 6 hours  lactated ringers. 1000 milliLiter(s) (125 mL/Hr) IV Continuous <Continuous>  morphine PF Spinal 0.1 milliGRAM(s) IntraThecal. once  oxytocin Infusion 333.333 milliUNIT(s)/Min (1000 mL/Hr) IV Continuous <Continuous>  sodium chloride 0.9%. 1000 milliLiter(s) (125 mL/Hr) IV Continuous <Continuous>    MEDICATIONS  (PRN):  dexAMETHasone  Injectable 4 milliGRAM(s) IV Push every 6 hours PRN Nausea  diphenhydrAMINE 25 milliGRAM(s) Oral every 6 hours PRN Pruritus  lanolin Ointment 1 Application(s) Topical every 6 hours PRN Sore Nipples  magnesium hydroxide Suspension 30 milliLiter(s) Oral two times a day PRN Constipation  naloxone Injectable 0.1 milliGRAM(s) IV Push every 3 minutes PRN For ANY of the following changes in patient status:  A. Breaths Per Minute LESS THAN 10, B. Oxygen saturation LESS THAN 90%, C. Sedation score of 6 for Stop After: 4 Times  ondansetron Injectable 4 milliGRAM(s) IV Push every 6 hours PRN Nausea  oxyCODONE    IR 5 milliGRAM(s) Oral every 3 hours PRN Mild Pain (1 - 3)  oxyCODONE    IR 10 milliGRAM(s) Oral every 3 hours PRN Moderate Pain (4 - 6)  oxyCODONE    IR 5 milliGRAM(s) Oral every 3 hours PRN Moderate to Severe Pain (4-10)  oxyCODONE    IR 5 milliGRAM(s) Oral once PRN Moderate to Severe Pain (4-10)  simethicone 80 milliGRAM(s) Chew every 4 hours PRN Gas      OBJECTIVE:    Sedation:        	[X] Alert	[ ] Drowsy	[ ] Arousable      [ ] Asleep       [ ] Unresponsive    Side Effects:	[X] None	[ ] Nausea	[ ] Vomiting         [ ] Pruritus  		[ ] Weakness            [ ] Numbness	          [ ] Other:    Vital Signs Last 24 Hrs  T(C): 36.9 (01 Sep 2021 09:28), Max: 37.2 (31 Aug 2021 23:35)  T(F): 98.5 (01 Sep 2021 09:28), Max: 98.96 (31 Aug 2021 23:35)  HR: 80 (01 Sep 2021 09:28) (56 - 91)  BP: 105/67 (01 Sep 2021 09:28) (103/59 - 131/98)  BP(mean): 79 (01 Sep 2021 04:23) (75 - 85)  RR: 17 (01 Sep 2021 09:28) (16 - 18)  SpO2: 98% (01 Sep 2021 09:28) (92% - 100%)    ASSESSMENT/ PLAN  [X] Patient transitioned to prn analgesics  [X] Pain management per primary service, pain service to sign off   [X]Documentation and Verification of current medications

## 2021-09-01 NOTE — OB RN INTRAOPERATIVE NOTE - NSSELHIDDEN_OBGYN_ALL_OB_FT
[NS_DeliveryAttending1_OBGYN_ALL_OB_FT:[id number]=],[NS_DeliveryAssist1_OBGYN_ALL_OB_FT:[id number]=],[NS_DeliveryRN_OBGYN_ALL_OB_FT:[id number]=]

## 2021-09-01 NOTE — OB NEONATOLOGY/PEDIATRICIAN DELIVERY SUMMARY - NSPEDSNEONOTESA_OBGYN_ALL_OB_FT
Baby is a 38 wk GA gilr born to a 35 y/o  mother via C/S. Mom presented to OB clinic today with nausea/vomiting/abdominal pain. Concern for uterine rupture despite reassuring fetal status. Uterine window present.  Maternal history uncontrolled T2DM on insulin and metformin. Prenatal history uncomplicated. Maternal BT O+. PNL neg, NR, and immune. GBS positive on 8/15, no labor or rupture.  AROM at delivery, clear, foul smelling fluid. Baby born vigorous and crying spontaneously. WDSS. Apgars 8/9. EOS not applicable (no rupture or labor) . Mom plans to breastfeed, would like hepB. COVID negative.

## 2021-09-01 NOTE — PROVIDER CONTACT NOTE (MEDICATION) - ACTION/TREATMENT ORDERED:
as per MD Duncan HUGHES Metformin order to be held and discontinued, pt FS to be rechecked before breakfast, no further interventions at this time, continuing to monitor.

## 2021-09-01 NOTE — ANESTHESIA FOLLOW-UP NOTE - NSRECOMMENDFT_GEN_ALL_CORE
will follow patient for resolution. If symptoms worsen, or patient develops new symptoms including weakness patient will need urgent imaging and Neurology evaluation.

## 2021-09-01 NOTE — ANESTHESIA FOLLOW-UP NOTE - NSEVALATIONFT_GEN_ALL_CORE
Patient POD 1 s/p  with spinal.   Patient complaining of RLE numbness from hip down. No numbness In left lower extremity.   Patient denies back pain, or bladder/bowel dysfunction.   Pain with decreased sensation to light touch, however +sensation to painful. Strength intact in RLE.

## 2021-09-01 NOTE — OB PROVIDER DELIVERY SUMMARY - NSSELHIDDEN_OBGYN_ALL_OB_FT
[NS_DeliveryAttending1_OBGYN_ALL_OB_FT:[id number]=],[NS_DeliveryAssist1_OBGYN_ALL_OB_FT:[id number]=],[NS_DeliveryRN_OBGYN_ALL_OB_FT:[id number]=]

## 2021-09-02 LAB
BASOPHILS # BLD AUTO: 0.01 K/UL — SIGNIFICANT CHANGE UP (ref 0–0.2)
BASOPHILS NFR BLD AUTO: 0.1 % — SIGNIFICANT CHANGE UP (ref 0–2)
EOSINOPHIL # BLD AUTO: 0.04 K/UL — SIGNIFICANT CHANGE UP (ref 0–0.5)
EOSINOPHIL NFR BLD AUTO: 0.5 % — SIGNIFICANT CHANGE UP (ref 0–6)
GLUCOSE BLDC GLUCOMTR-MCNC: 112 MG/DL — HIGH (ref 70–99)
GLUCOSE BLDC GLUCOMTR-MCNC: 119 MG/DL — HIGH (ref 70–99)
GLUCOSE BLDC GLUCOMTR-MCNC: 126 MG/DL — HIGH (ref 70–99)
HCT VFR BLD CALC: 28 % — LOW (ref 34.5–45)
HGB BLD-MCNC: 9.1 G/DL — LOW (ref 11.5–15.5)
IMM GRANULOCYTES NFR BLD AUTO: 0.7 % — SIGNIFICANT CHANGE UP (ref 0–1.5)
LYMPHOCYTES # BLD AUTO: 1.98 K/UL — SIGNIFICANT CHANGE UP (ref 1–3.3)
LYMPHOCYTES # BLD AUTO: 27.2 % — SIGNIFICANT CHANGE UP (ref 13–44)
MCHC RBC-ENTMCNC: 27.7 PG — SIGNIFICANT CHANGE UP (ref 27–34)
MCHC RBC-ENTMCNC: 32.5 GM/DL — SIGNIFICANT CHANGE UP (ref 32–36)
MCV RBC AUTO: 85.4 FL — SIGNIFICANT CHANGE UP (ref 80–100)
MONOCYTES # BLD AUTO: 0.54 K/UL — SIGNIFICANT CHANGE UP (ref 0–0.9)
MONOCYTES NFR BLD AUTO: 7.4 % — SIGNIFICANT CHANGE UP (ref 2–14)
NEUTROPHILS # BLD AUTO: 4.66 K/UL — SIGNIFICANT CHANGE UP (ref 1.8–7.4)
NEUTROPHILS NFR BLD AUTO: 64.1 % — SIGNIFICANT CHANGE UP (ref 43–77)
NRBC # BLD: 0 /100 WBCS — SIGNIFICANT CHANGE UP (ref 0–0)
PLATELET # BLD AUTO: 184 K/UL — SIGNIFICANT CHANGE UP (ref 150–400)
RBC # BLD: 3.28 M/UL — LOW (ref 3.8–5.2)
RBC # FLD: 13.4 % — SIGNIFICANT CHANGE UP (ref 10.3–14.5)
WBC # BLD: 7.28 K/UL — SIGNIFICANT CHANGE UP (ref 3.8–10.5)
WBC # FLD AUTO: 7.28 K/UL — SIGNIFICANT CHANGE UP (ref 3.8–10.5)

## 2021-09-02 RX ORDER — HEPARIN SODIUM 5000 [USP'U]/ML
5000 INJECTION INTRAVENOUS; SUBCUTANEOUS EVERY 12 HOURS
Refills: 0 | Status: DISCONTINUED | OUTPATIENT
Start: 2021-09-02 | End: 2021-09-03

## 2021-09-02 RX ORDER — IBUPROFEN 200 MG
600 TABLET ORAL EVERY 6 HOURS
Refills: 0 | Status: DISCONTINUED | OUTPATIENT
Start: 2021-09-02 | End: 2021-09-03

## 2021-09-02 RX ADMIN — SIMETHICONE 80 MILLIGRAM(S): 80 TABLET, CHEWABLE ORAL at 21:12

## 2021-09-02 RX ADMIN — Medication 600 MILLIGRAM(S): at 12:42

## 2021-09-02 RX ADMIN — Medication 975 MILLIGRAM(S): at 03:10

## 2021-09-02 RX ADMIN — Medication 975 MILLIGRAM(S): at 10:45

## 2021-09-02 RX ADMIN — Medication 975 MILLIGRAM(S): at 18:23

## 2021-09-02 RX ADMIN — Medication 975 MILLIGRAM(S): at 18:54

## 2021-09-02 RX ADMIN — Medication 600 MILLIGRAM(S): at 18:23

## 2021-09-02 RX ADMIN — Medication 600 MILLIGRAM(S): at 06:15

## 2021-09-02 RX ADMIN — Medication 600 MILLIGRAM(S): at 23:58

## 2021-09-02 RX ADMIN — HEPARIN SODIUM 5000 UNIT(S): 5000 INJECTION INTRAVENOUS; SUBCUTANEOUS at 18:24

## 2021-09-02 RX ADMIN — Medication 975 MILLIGRAM(S): at 10:05

## 2021-09-02 RX ADMIN — Medication 975 MILLIGRAM(S): at 23:57

## 2021-09-02 RX ADMIN — HEPARIN SODIUM 5000 UNIT(S): 5000 INJECTION INTRAVENOUS; SUBCUTANEOUS at 05:55

## 2021-09-02 RX ADMIN — Medication 600 MILLIGRAM(S): at 13:30

## 2021-09-02 RX ADMIN — Medication 975 MILLIGRAM(S): at 01:53

## 2021-09-02 RX ADMIN — Medication 600 MILLIGRAM(S): at 18:54

## 2021-09-02 NOTE — PROGRESS NOTE ADULT - SUBJECTIVE AND OBJECTIVE BOX
Day 1 of Anesthesia Pain Management Service    SUBJECTIVE:  Pain Scale Score:          [X] Refer to charted pain scores    THERAPY: Received PF spinal morphine as above    OBJECTIVE:    Sedation:        	[X] Alert	[ ] Drowsy	[ ] Arousable      [ ] Asleep       [ ] Unresponsive    Side Effects:	[X] None	[ ] Nausea	[ ] Vomiting         [ ] Pruritus  		[ ] Weakness            [X ] Numbness - improved, sensory in RLE without motor symptoms, no urinary incontinence/retention	          [ ] Other:    ASSESSMENT/ PLAN  [X] Patient transitioned to prn analgesics  [X] Pain management per primary service, pain service to sign off   [X]Documentation and Verification of current medications

## 2021-09-02 NOTE — PROGRESS NOTE ADULT - SUBJECTIVE AND OBJECTIVE BOX
OB Postpartum Note:  Delivery    S: 35yo GDMA2 now POD #1 s/p LTCS. Her pain is well controlled. She is tolerating a regular diet but has not yet passed flatus. Voiding spontaneously and ambulating without difficulty. Denies N/V. Denies CP/SOB/lightheadedness/dizziness.   Patient desires Nexplanon.    O:   Vitals:  Vital Signs Last 24 Hrs  T(C): 36.7 (02 Sep 2021 05:15), Max: 37 (01 Sep 2021 13:15)  T(F): 98.1 (02 Sep 2021 05:15), Max: 98.6 (01 Sep 2021 13:15)  HR: 77 (02 Sep 2021 05:15) (74 - 80)  BP: 97/62 (02 Sep 2021 05:15) (95/60 - 105/67)  BP(mean): --  RR: 18 (02 Sep 2021 05:15) (16 - 18)  SpO2: 97% (02 Sep 2021 05:15) (97% - 98%)    MEDICATIONS  (STANDING):  acetaminophen   Tablet .. 975 milliGRAM(s) Oral <User Schedule>  dextrose 40% Gel 15 Gram(s) Oral once  dextrose 5%. 1000 milliLiter(s) (50 mL/Hr) IV Continuous <Continuous>  dextrose 5%. 1000 milliLiter(s) (100 mL/Hr) IV Continuous <Continuous>  dextrose 50% Injectable 25 Gram(s) IV Push once  dextrose 50% Injectable 12.5 Gram(s) IV Push once  dextrose 50% Injectable 25 Gram(s) IV Push once  diphtheria/tetanus/pertussis (acellular) Vaccine (ADAcel) 0.5 milliLiter(s) IntraMuscular once  glucagon  Injectable 1 milliGRAM(s) IntraMuscular once  heparin   Injectable 5000 Unit(s) SubCutaneous every 12 hours  ibuprofen  Tablet. 600 milliGRAM(s) Oral every 6 hours  insulin lispro (ADMELOG) corrective regimen sliding scale   SubCutaneous three times a day before meals  lactated ringers. 1000 milliLiter(s) (125 mL/Hr) IV Continuous <Continuous>  oxytocin Infusion 333.333 milliUNIT(s)/Min (1000 mL/Hr) IV Continuous <Continuous>  sodium chloride 0.9%. 1000 milliLiter(s) (125 mL/Hr) IV Continuous <Continuous>    MEDICATIONS  (PRN):  dexAMETHasone  Injectable 4 milliGRAM(s) IV Push every 6 hours PRN Nausea  diphenhydrAMINE 25 milliGRAM(s) Oral every 6 hours PRN Pruritus  lanolin Ointment 1 Application(s) Topical every 6 hours PRN Sore Nipples  magnesium hydroxide Suspension 30 milliLiter(s) Oral two times a day PRN Constipation  naloxone Injectable 0.1 milliGRAM(s) IV Push every 3 minutes PRN For ANY of the following changes in patient status:  A. Breaths Per Minute LESS THAN 10, B. Oxygen saturation LESS THAN 90%, C. Sedation score of 6 for Stop After: 4 Times  ondansetron Injectable 4 milliGRAM(s) IV Push every 6 hours PRN Nausea  oxyCODONE    IR 5 milliGRAM(s) Oral once PRN Moderate to Severe Pain (4-10)  oxyCODONE    IR 5 milliGRAM(s) Oral every 3 hours PRN Moderate to Severe Pain (4-10)  simethicone 80 milliGRAM(s) Chew every 4 hours PRN Gas      Labs:  Blood type: O Positive  Rubella IgG: RPR: Negative                          9.1<L>   7.28 >-----------< 184    (  @ 07:07 )             28.0<L>                        11.5   9.99 >-----------< 223    (  @ 23:17 )             35.4                        11.6   10.30 >-----------< 228    (  @ 21:10 )             36.5    21 @ 21:10      138  |  103  |  7   ----------------------------<  71  4.1   |  20<L>  |  0.54        Ca    8.9      31 Aug 2021 21:10    TPro  6.6  /  Alb  3.6  /  TBili  0.3  /  DBili  x   /  AST  14  /  ALT  <5<L>  /  AlkPhos  185<H>  21 @ 21:10          PE:  General: NAD, patient resting comfortably in bed  Abdomen: Mildly distended, appropriately tender  Incision: Clean, dry, intact.  Extremities: SCDs in place, no erythema

## 2021-09-02 NOTE — PROGRESS NOTE ADULT - SUBJECTIVE AND OBJECTIVE BOX
Day 2 of Anesthesia Pain Management Service    SUBJECTIVE: poco dolor  Pain Scale Score:          [X] Refer to charted pain scores    THERAPY:    s/p    100 mcg PF morphine on 8/31/2021       MEDICATIONS  (STANDING):  acetaminophen   Tablet .. 975 milliGRAM(s) Oral <User Schedule>  dextrose 40% Gel 15 Gram(s) Oral once  dextrose 5%. 1000 milliLiter(s) (50 mL/Hr) IV Continuous <Continuous>  dextrose 5%. 1000 milliLiter(s) (100 mL/Hr) IV Continuous <Continuous>  dextrose 50% Injectable 25 Gram(s) IV Push once  dextrose 50% Injectable 12.5 Gram(s) IV Push once  dextrose 50% Injectable 25 Gram(s) IV Push once  diphtheria/tetanus/pertussis (acellular) Vaccine (ADAcel) 0.5 milliLiter(s) IntraMuscular once  glucagon  Injectable 1 milliGRAM(s) IntraMuscular once  heparin   Injectable 5000 Unit(s) SubCutaneous every 12 hours  ibuprofen  Tablet. 600 milliGRAM(s) Oral every 6 hours  insulin lispro (ADMELOG) corrective regimen sliding scale   SubCutaneous three times a day before meals  lactated ringers. 1000 milliLiter(s) (125 mL/Hr) IV Continuous <Continuous>  oxytocin Infusion 333.333 milliUNIT(s)/Min (1000 mL/Hr) IV Continuous <Continuous>  sodium chloride 0.9%. 1000 milliLiter(s) (125 mL/Hr) IV Continuous <Continuous>    MEDICATIONS  (PRN):  dexAMETHasone  Injectable 4 milliGRAM(s) IV Push every 6 hours PRN Nausea  diphenhydrAMINE 25 milliGRAM(s) Oral every 6 hours PRN Pruritus  lanolin Ointment 1 Application(s) Topical every 6 hours PRN Sore Nipples  magnesium hydroxide Suspension 30 milliLiter(s) Oral two times a day PRN Constipation  naloxone Injectable 0.1 milliGRAM(s) IV Push every 3 minutes PRN For ANY of the following changes in patient status:  A. Breaths Per Minute LESS THAN 10, B. Oxygen saturation LESS THAN 90%, C. Sedation score of 6 for Stop After: 4 Times  ondansetron Injectable 4 milliGRAM(s) IV Push every 6 hours PRN Nausea  oxyCODONE    IR 5 milliGRAM(s) Oral once PRN Moderate to Severe Pain (4-10)  oxyCODONE    IR 5 milliGRAM(s) Oral every 3 hours PRN Moderate to Severe Pain (4-10)  simethicone 80 milliGRAM(s) Chew every 4 hours PRN Gas      OBJECTIVE:    Sedation:        	[X] Alert	 [ ] Drowsy	[ ] Arousable      [ ] Asleep       [ ] Unresponsive    Side Effects:	[X] None 	[ ] Nausea	[ ] Vomiting         [ ] Pruritus  		[ ] Weakness            [ ] Numbness	          [ ] Other:    Vital Signs Last 24 Hrs  T(C): 36.7 (02 Sep 2021 05:15), Max: 37 (01 Sep 2021 13:15)  T(F): 98.1 (02 Sep 2021 05:15), Max: 98.6 (01 Sep 2021 13:15)  HR: 77 (02 Sep 2021 05:15) (74 - 80)  BP: 97/62 (02 Sep 2021 05:15) (95/60 - 105/67)  BP(mean): --  RR: 18 (02 Sep 2021 05:15) (16 - 18)  SpO2: 97% (02 Sep 2021 05:15) (97% - 98%)    ASSESSMENT/ PLAN: RLE numbness resolved. No motor blockade, ambulating to BR.   [X] Patient transitioned to prn analgesics  [X] Pain management per primary service, pain service to sign off   [X]Documentation and Verification of current medications

## 2021-09-03 ENCOUNTER — TRANSCRIPTION ENCOUNTER (OUTPATIENT)
Age: 36
End: 2021-09-03

## 2021-09-03 ENCOUNTER — APPOINTMENT (OUTPATIENT)
Dept: MATERNAL FETAL MEDICINE | Facility: CLINIC | Age: 36
End: 2021-09-03

## 2021-09-03 ENCOUNTER — APPOINTMENT (OUTPATIENT)
Dept: ANTEPARTUM | Facility: CLINIC | Age: 36
End: 2021-09-03

## 2021-09-03 VITALS
OXYGEN SATURATION: 98 % | RESPIRATION RATE: 18 BRPM | TEMPERATURE: 98 F | DIASTOLIC BLOOD PRESSURE: 75 MMHG | HEART RATE: 80 BPM | SYSTOLIC BLOOD PRESSURE: 110 MMHG

## 2021-09-03 LAB
GLUCOSE BLDC GLUCOMTR-MCNC: 109 MG/DL — HIGH (ref 70–99)
GLUCOSE BLDC GLUCOMTR-MCNC: 116 MG/DL — HIGH (ref 70–99)

## 2021-09-03 PROCEDURE — 86769 SARS-COV-2 COVID-19 ANTIBODY: CPT

## 2021-09-03 PROCEDURE — 86780 TREPONEMA PALLIDUM: CPT

## 2021-09-03 PROCEDURE — 87070 CULTURE OTHR SPECIMN AEROBIC: CPT

## 2021-09-03 PROCEDURE — 59050 FETAL MONITOR W/REPORT: CPT

## 2021-09-03 PROCEDURE — 81001 URINALYSIS AUTO W/SCOPE: CPT

## 2021-09-03 PROCEDURE — 85025 COMPLETE CBC W/AUTO DIFF WBC: CPT

## 2021-09-03 PROCEDURE — 82150 ASSAY OF AMYLASE: CPT

## 2021-09-03 PROCEDURE — 86850 RBC ANTIBODY SCREEN: CPT

## 2021-09-03 PROCEDURE — 82962 GLUCOSE BLOOD TEST: CPT

## 2021-09-03 PROCEDURE — 85610 PROTHROMBIN TIME: CPT

## 2021-09-03 PROCEDURE — 59025 FETAL NON-STRESS TEST: CPT

## 2021-09-03 PROCEDURE — 88307 TISSUE EXAM BY PATHOLOGIST: CPT

## 2021-09-03 PROCEDURE — 87075 CULTR BACTERIA EXCEPT BLOOD: CPT

## 2021-09-03 PROCEDURE — 83690 ASSAY OF LIPASE: CPT

## 2021-09-03 PROCEDURE — 86901 BLOOD TYPING SEROLOGIC RH(D): CPT

## 2021-09-03 PROCEDURE — 87635 SARS-COV-2 COVID-19 AMP PRB: CPT

## 2021-09-03 PROCEDURE — 0225U NFCT DS DNA&RNA 21 SARSCOV2: CPT

## 2021-09-03 PROCEDURE — G0463: CPT

## 2021-09-03 PROCEDURE — 85730 THROMBOPLASTIN TIME PARTIAL: CPT

## 2021-09-03 PROCEDURE — 86900 BLOOD TYPING SEROLOGIC ABO: CPT

## 2021-09-03 PROCEDURE — 80053 COMPREHEN METABOLIC PANEL: CPT

## 2021-09-03 PROCEDURE — 85384 FIBRINOGEN ACTIVITY: CPT

## 2021-09-03 PROCEDURE — 76705 ECHO EXAM OF ABDOMEN: CPT

## 2021-09-03 PROCEDURE — 82010 KETONE BODYS QUAN: CPT

## 2021-09-03 RX ORDER — MEDROXYPROGESTERONE ACETATE 150 MG/ML
150 INJECTION, SUSPENSION, EXTENDED RELEASE INTRAMUSCULAR ONCE
Refills: 0 | Status: COMPLETED | OUTPATIENT
Start: 2021-09-03 | End: 2021-09-03

## 2021-09-03 RX ORDER — METFORMIN HYDROCHLORIDE 850 MG/1
1 TABLET ORAL
Qty: 0 | Refills: 0 | DISCHARGE

## 2021-09-03 RX ORDER — IBUPROFEN 200 MG
1 TABLET ORAL
Qty: 0 | Refills: 0 | DISCHARGE
Start: 2021-09-03

## 2021-09-03 RX ORDER — LABETALOL HCL 100 MG
1 TABLET ORAL
Qty: 0 | Refills: 0 | DISCHARGE

## 2021-09-03 RX ORDER — POLYETHYLENE GLYCOL 3350 17 G/17G
17 POWDER, FOR SOLUTION ORAL ONCE
Refills: 0 | Status: COMPLETED | OUTPATIENT
Start: 2021-09-03 | End: 2021-09-03

## 2021-09-03 RX ORDER — ACETAMINOPHEN 500 MG
3 TABLET ORAL
Qty: 0 | Refills: 0 | DISCHARGE
Start: 2021-09-03

## 2021-09-03 RX ADMIN — Medication 600 MILLIGRAM(S): at 12:44

## 2021-09-03 RX ADMIN — Medication 975 MILLIGRAM(S): at 12:44

## 2021-09-03 RX ADMIN — HEPARIN SODIUM 5000 UNIT(S): 5000 INJECTION INTRAVENOUS; SUBCUTANEOUS at 05:23

## 2021-09-03 RX ADMIN — Medication 600 MILLIGRAM(S): at 05:23

## 2021-09-03 RX ADMIN — Medication 975 MILLIGRAM(S): at 05:22

## 2021-09-03 RX ADMIN — SIMETHICONE 80 MILLIGRAM(S): 80 TABLET, CHEWABLE ORAL at 10:11

## 2021-09-03 RX ADMIN — POLYETHYLENE GLYCOL 3350 17 GRAM(S): 17 POWDER, FOR SOLUTION ORAL at 12:44

## 2021-09-03 RX ADMIN — Medication 600 MILLIGRAM(S): at 13:45

## 2021-09-03 RX ADMIN — MEDROXYPROGESTERONE ACETATE 150 MILLIGRAM(S): 150 INJECTION, SUSPENSION, EXTENDED RELEASE INTRAMUSCULAR at 12:44

## 2021-09-03 RX ADMIN — Medication 975 MILLIGRAM(S): at 13:45

## 2021-09-03 NOTE — DISCHARGE NOTE OB - CARE PROVIDER_API CALL
[facility],   [address] [place] [address] [address], [place] [address] [phone]  Phone: (   )    -  Fax: (   )    -  Follow Up Time:

## 2021-09-03 NOTE — PROGRESS NOTE ADULT - ATTENDING COMMENTS
See above
Pt seen and examined.  agree with note above.  pt doing well  routine PP care.
I have personally seen, examined, and participated in the care of this patient. I have reviewed all pertinent clinical information, including history, physical exam, plan, and the Resident 's note and agree except as noted.       Explained Nexplanon vs DepoProvera shot using the video interpeter.    R/B of each explained.  Patient elects the DepoProvera.  D/C planning.

## 2021-09-03 NOTE — PROGRESS NOTE ADULT - ASSESSMENT
A/P: 35yo POD #1 s/p LTCS.  Patient is stable and doing well post-operatively.    - Left pressure dressing over incision, to be removed in the afternoon.  - Continue regular diet.  - Increase ambulation.  - d/c PCA and transition to PO pain medication with Tylenol, Motrin and Oxycodone PRN for pain control.    - POD #1 CBC this morning.   - DVT prophylaxis with Heparin 5000u BID    Amyeo Jereen PGY-1  
  A/P: 37yo T2DM  POD #2 s/p LTCS.  Patient is stable and doing well post-operatively.    - Continue regular diet.  - Endocrinology to be consulted for blood sugar control.  - Increase ambulation as tolerated.   - Standing Ibuprofen and Acetaminophen for pain, Oxycodone available PRN for severe pain.  - DVT prophylaxis with Heparin 5000u BID    Amyeo Jereen PGY-1
  A/P: 35yo GDMA2 POD #1 s/p LTCS.  Patient is stable and doing well post-operatively.    - Continue regular diet.  - Increase ambulation.  - d/c PCA and transition to PO pain medication with Tylenol, Motrin and Oxycodone PRN for pain control.    - Place Nexplanon on left arm.  - DVT prophylaxis with Heparin 5000u BID    Amyeo Jereen PGY-1

## 2021-09-03 NOTE — DISCHARGE NOTE OB - CARE PLAN
Principal Discharge DX:	 delivery delivered  Assessment and plan of treatment:	post-partum recovery   1

## 2021-09-03 NOTE — PROGRESS NOTE ADULT - SUBJECTIVE AND OBJECTIVE BOX
OB Postpartum Note:  Delivery    S: 35yo T2DM now POD #2 s/p LTCS. Her pain is well controlled. She is tolerating a regular diet and is passing flatus. Voiding spontaneously and ambulating without difficulty. Denies N/V. Denies CP/SOB/lightheadedness/dizziness.   Plan for nexplanon insertion today.    O:   Vitals:  Vital Signs Last 24 Hrs  T(C): 36.8 (03 Sep 2021 05:35), Max: 36.8 (02 Sep 2021 13:40)  T(F): 98.2 (03 Sep 2021 05:35), Max: 98.3 (02 Sep 2021 13:40)  HR: 73 (03 Sep 2021 05:35) (62 - 79)  BP: 112/77 (03 Sep 2021 05:35) (100/69 - 112/77)  BP(mean): --  RR: 17 (03 Sep 2021 05:35) (17 - 18)  SpO2: 96% (03 Sep 2021 05:35) (96% - 98%)    MEDICATIONS  (STANDING):  acetaminophen   Tablet .. 975 milliGRAM(s) Oral <User Schedule>  dextrose 40% Gel 15 Gram(s) Oral once  dextrose 5%. 1000 milliLiter(s) (50 mL/Hr) IV Continuous <Continuous>  dextrose 5%. 1000 milliLiter(s) (100 mL/Hr) IV Continuous <Continuous>  dextrose 50% Injectable 25 Gram(s) IV Push once  dextrose 50% Injectable 12.5 Gram(s) IV Push once  dextrose 50% Injectable 25 Gram(s) IV Push once  diphtheria/tetanus/pertussis (acellular) Vaccine (ADAcel) 0.5 milliLiter(s) IntraMuscular once  glucagon  Injectable 1 milliGRAM(s) IntraMuscular once  heparin   Injectable 5000 Unit(s) SubCutaneous every 12 hours  ibuprofen  Tablet. 600 milliGRAM(s) Oral every 6 hours  insulin lispro (ADMELOG) corrective regimen sliding scale   SubCutaneous three times a day before meals  lactated ringers. 1000 milliLiter(s) (125 mL/Hr) IV Continuous <Continuous>  oxytocin Infusion 333.333 milliUNIT(s)/Min (1000 mL/Hr) IV Continuous <Continuous>  sodium chloride 0.9%. 1000 milliLiter(s) (125 mL/Hr) IV Continuous <Continuous>    MEDICATIONS  (PRN):  dexAMETHasone  Injectable 4 milliGRAM(s) IV Push every 6 hours PRN Nausea  diphenhydrAMINE 25 milliGRAM(s) Oral every 6 hours PRN Pruritus  lanolin Ointment 1 Application(s) Topical every 6 hours PRN Sore Nipples  magnesium hydroxide Suspension 30 milliLiter(s) Oral two times a day PRN Constipation  naloxone Injectable 0.1 milliGRAM(s) IV Push every 3 minutes PRN For ANY of the following changes in patient status:  A. Breaths Per Minute LESS THAN 10, B. Oxygen saturation LESS THAN 90%, C. Sedation score of 6 for Stop After: 4 Times  ondansetron Injectable 4 milliGRAM(s) IV Push every 6 hours PRN Nausea  oxyCODONE    IR 5 milliGRAM(s) Oral once PRN Moderate to Severe Pain (4-10)  oxyCODONE    IR 5 milliGRAM(s) Oral every 3 hours PRN Moderate to Severe Pain (4-10)  simethicone 80 milliGRAM(s) Chew every 4 hours PRN Gas      Labs:  Blood type: O Positive  Rubella IgG: RPR: Negative                          9.1<L>   7.28 >-----------< 184    (  @ 07:07 )             28.0<L>                        11.5   9.99 >-----------< 223    (  @ 23:17 )             35.4                        11.6   10.30 >-----------< 228    (  @ 21:10 )             36.5    21 @ 21:10      138  |  103  |  7   ----------------------------<  71  4.1   |  20<L>  |  0.54        Ca    8.9      31 Aug 2021 21:10    TPro  6.6  /  Alb  3.6  /  TBili  0.3  /  DBili  x   /  AST  14  /  ALT  <5<L>  /  AlkPhos  185<H>  21 @ 21:10          PE:  General: NAD, patient resting comfortably in bed  Abdomen: Mildly distended, appropriately tender. No rebound tenderness or guarding. Incision c/d/i.  Extremities: SCDs in place, no erythema   OB Postpartum Note:  Delivery    S: 37yo T2DM now POD #2 s/p LTCS. Her pain is well controlled. She is tolerating a regular diet and was passing flatus, not notes that she has stop passing and is requesting stool softeners Voiding spontaneously and ambulating without difficulty. Denies N/V. Denies CP/SOB/lightheadedness/dizziness.   Plan for nexplanon insertion today.    O:   Vitals:  Vital Signs Last 24 Hrs  T(C): 36.8 (03 Sep 2021 05:35), Max: 36.8 (02 Sep 2021 13:40)  T(F): 98.2 (03 Sep 2021 05:35), Max: 98.3 (02 Sep 2021 13:40)  HR: 73 (03 Sep 2021 05:35) (62 - 79)  BP: 112/77 (03 Sep 2021 05:35) (100/69 - 112/77)  BP(mean): --  RR: 17 (03 Sep 2021 05:35) (17 - 18)  SpO2: 96% (03 Sep 2021 05:35) (96% - 98%)    MEDICATIONS  (STANDING):  acetaminophen   Tablet .. 975 milliGRAM(s) Oral <User Schedule>  dextrose 40% Gel 15 Gram(s) Oral once  dextrose 5%. 1000 milliLiter(s) (50 mL/Hr) IV Continuous <Continuous>  dextrose 5%. 1000 milliLiter(s) (100 mL/Hr) IV Continuous <Continuous>  dextrose 50% Injectable 25 Gram(s) IV Push once  dextrose 50% Injectable 12.5 Gram(s) IV Push once  dextrose 50% Injectable 25 Gram(s) IV Push once  diphtheria/tetanus/pertussis (acellular) Vaccine (ADAcel) 0.5 milliLiter(s) IntraMuscular once  glucagon  Injectable 1 milliGRAM(s) IntraMuscular once  heparin   Injectable 5000 Unit(s) SubCutaneous every 12 hours  ibuprofen  Tablet. 600 milliGRAM(s) Oral every 6 hours  insulin lispro (ADMELOG) corrective regimen sliding scale   SubCutaneous three times a day before meals  lactated ringers. 1000 milliLiter(s) (125 mL/Hr) IV Continuous <Continuous>  oxytocin Infusion 333.333 milliUNIT(s)/Min (1000 mL/Hr) IV Continuous <Continuous>  sodium chloride 0.9%. 1000 milliLiter(s) (125 mL/Hr) IV Continuous <Continuous>    MEDICATIONS  (PRN):  dexAMETHasone  Injectable 4 milliGRAM(s) IV Push every 6 hours PRN Nausea  diphenhydrAMINE 25 milliGRAM(s) Oral every 6 hours PRN Pruritus  lanolin Ointment 1 Application(s) Topical every 6 hours PRN Sore Nipples  magnesium hydroxide Suspension 30 milliLiter(s) Oral two times a day PRN Constipation  naloxone Injectable 0.1 milliGRAM(s) IV Push every 3 minutes PRN For ANY of the following changes in patient status:  A. Breaths Per Minute LESS THAN 10, B. Oxygen saturation LESS THAN 90%, C. Sedation score of 6 for Stop After: 4 Times  ondansetron Injectable 4 milliGRAM(s) IV Push every 6 hours PRN Nausea  oxyCODONE    IR 5 milliGRAM(s) Oral once PRN Moderate to Severe Pain (4-10)  oxyCODONE    IR 5 milliGRAM(s) Oral every 3 hours PRN Moderate to Severe Pain (4-10)  simethicone 80 milliGRAM(s) Chew every 4 hours PRN Gas      Labs:  Blood type: O Positive  Rubella IgG: RPR: Negative                          9.1<L>   7.28 >-----------< 184    (  @ 07:07 )             28.0<L>                        11.5   9.99 >-----------< 223    (  @ 23:17 )             35.4                        11.6   10.30 >-----------< 228    (  @ 21:10 )             36.5    21 @ 21:10      138  |  103  |  7   ----------------------------<  71  4.1   |  20<L>  |  0.54        Ca    8.9      31 Aug 2021 21:10    TPro  6.6  /  Alb  3.6  /  TBili  0.3  /  DBili  x   /  AST  14  /  ALT  <5<L>  /  AlkPhos  185<H>  21 @ 21:10          PE:  General: NAD, patient resting comfortably in bed  Abdomen: Mildly distended, appropriately tender. No rebound tenderness or guarding. Incision c/d/i.  Extremities: SCDs in place, no erythema

## 2021-09-03 NOTE — CHART NOTE - NSCHARTNOTEFT_GEN_A_CORE
Case discussed with OBGYN team. Recommend discharge off diabetes medication if FS<150. Recommend discharge on metformin 500mg BID if FS>150 persistently. Follow up within 6 weeks with PMD as outpt. Patient can follow up with Endocrine Office [address] [place] [address] [phone].    Jonna Alvarado DO

## 2021-09-03 NOTE — DISCHARGE NOTE OB - HOSPITAL COURSE
Patient had uncomplicated low transverse  section for __.  Please see operative note for details.  During postpartum course patient's vitals were stable, vaginal bleeding appropriate, and pain well controlled.  Post operation day one hematocrit was appropriate.  On day of discharge patient was ambulating, her pain controlled with oral medications, had adequate oral intake, and was voiding freely.  Discharge instructions and precautions were given.  Will return to clinic in 2 weeks for incision check.  Postpartum birth control plan is Nexplanon Lot # ___.   Patient had uncomplicated low transverse  section for __.  Please see operative note for details.  During postpartum course patient's vitals were stable, vaginal bleeding appropriate, and pain well controlled.  Post operation day one hematocrit was appropriate.  On day of discharge patient was ambulating, her pain controlled with oral medications, had adequate oral intake, and was voiding freely.  Discharge instructions and precautions were given.  Will return to clinic in 2 weeks for incision check.  Postpartum birth control plan is Depo-provera.   Patient had uncomplicated repeat low transverse  section.  Please see operative note for details.  During postpartum course patient's vitals were stable, vaginal bleeding appropriate, and pain well controlled. Patient blood sugars have been well control without any standing medication requirements. Endocrinology was consulted and assess patient's post-partum diabetes needs, to followup with PCP or [facility] out-patient within 6 weeks. Post operation day one hematocrit was appropriate.  On day of discharge patient was ambulating, her pain controlled with oral medications, had adequate oral intake, and was voiding freely.  Discharge instructions and precautions were given.  Will return to clinic in 2 weeks for incision check.  Postpartum birth control plan is Depo-provera.

## 2021-09-03 NOTE — DISCHARGE NOTE OB - ADDITIONAL INSTRUCTIONS
After discharge, please stay on pelvic rest for 6 weeks, meaning no sexual intercourse, no tampons and no douching.  No driving for 2 weeks as women can loose a lot of blood during delivery and there is a possibility of being lightheaded/fainting.  No lifting objects heavier than baby for two weeks.  Expect to have vaginal bleeding/spotting for up to six weeks.  The bleeding should get lighter and more white/light brown with time.  For bleeding soaking more than a pad an hour or passing clots greater than the size of your fist, come in to the emergency department.    Follow up in clinic in 2 weeks for incision check.  Call clinic for noticeable increase in redness or swelling at incision, discharge from incision, or opening of skin at incision site. After discharge, please stay on pelvic rest for 6 weeks, meaning no sexual intercourse, no tampons and no douching.  No driving for 2 weeks as women can loose a lot of blood during delivery and there is a possibility of being lightheaded/fainting.  No lifting objects heavier than baby for two weeks.  Expect to have vaginal bleeding/spotting for up to six weeks.  The bleeding should get lighter and more white/light brown with time.  For bleeding soaking more than a pad an hour or passing clots greater than the size of your fist, come in to the emergency department.    Follow up in clinic in 2 weeks for incision check.  Call clinic for noticeable increase in redness or swelling at incision, discharge from incision, or opening of skin at incision site.     No medications for blood sugar is necessary at this time. Please see primary care doctor or [facility], Office [address] [facility] [phone], for diabetes care within 6 weeks of delivery.    Please return for Depo-provera shots for every 3 months.

## 2021-09-03 NOTE — DISCHARGE NOTE OB - MEDICATION SUMMARY - MEDICATIONS TO TAKE
I will START or STAY ON the medications listed below when I get home from the hospital:    acetaminophen 325 mg oral tablet  -- 3 tab(s) by mouth   -- Indication: For pain    ibuprofen 600 mg oral tablet  -- 1 tab(s) by mouth every 6 hours  -- Indication: For pain    PNV Prenatal oral tablet  -- 1 tab(s) by mouth once a day   -- May discolor urine or feces.  Take with food or milk.    -- Indication: For post-partum health

## 2021-09-03 NOTE — DISCHARGE NOTE OB - PATIENT PORTAL LINK FT
You can access the FollowMyHealth Patient Portal offered by [facility] by registering at the following website: [email or web address]. By joining [employer]s FollowMyHealth portal, you will also be able to view your health information using other applications (apps) compatible with our system.

## 2021-09-03 NOTE — DISCHARGE NOTE OB - MEDICATION SUMMARY - MEDICATIONS TO STOP TAKING
I will STOP taking the medications listed below when I get home from the hospital:    metFORMIN 1000 mg oral tablet  -- 1 tab(s) by mouth 2 times a day    labetalol 100 mg oral tablet  -- 1 tab(s) by mouth 2 times a day
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
## 2021-09-03 NOTE — DISCHARGE NOTE OB - PROVIDER TOKENS
FREE:[LAST:[[facility]],PHONE:[(   )    -],FAX:[(   )    -],ADDRESS:[[address] [place] [address] [address], [place] [address] [phone]]]

## 2021-09-06 LAB
CULTURE RESULTS: NO GROWTH — SIGNIFICANT CHANGE UP
CULTURE RESULTS: NO GROWTH — SIGNIFICANT CHANGE UP
SPECIMEN SOURCE: SIGNIFICANT CHANGE UP
SPECIMEN SOURCE: SIGNIFICANT CHANGE UP

## 2021-09-07 ENCOUNTER — APPOINTMENT (OUTPATIENT)
Dept: ANTEPARTUM | Facility: CLINIC | Age: 36
End: 2021-09-07

## 2021-09-08 ENCOUNTER — APPOINTMENT (OUTPATIENT)
Dept: ANTEPARTUM | Facility: CLINIC | Age: 36
End: 2021-09-08

## 2021-09-12 ENCOUNTER — NON-APPOINTMENT (OUTPATIENT)
Age: 36
End: 2021-09-12

## 2021-09-14 ENCOUNTER — OUTPATIENT (OUTPATIENT)
Dept: OUTPATIENT SERVICES | Facility: HOSPITAL | Age: 36
LOS: 1 days | End: 2021-09-14
Payer: MEDICAID

## 2021-09-14 ENCOUNTER — APPOINTMENT (OUTPATIENT)
Dept: MATERNAL FETAL MEDICINE | Facility: CLINIC | Age: 36
End: 2021-09-14
Payer: MEDICAID

## 2021-09-14 ENCOUNTER — APPOINTMENT (OUTPATIENT)
Dept: MATERNAL FETAL MEDICINE | Facility: CLINIC | Age: 36
End: 2021-09-14

## 2021-09-14 VITALS
HEART RATE: 74 BPM | HEIGHT: 61 IN | DIASTOLIC BLOOD PRESSURE: 80 MMHG | OXYGEN SATURATION: 99 % | SYSTOLIC BLOOD PRESSURE: 122 MMHG | TEMPERATURE: 98.4 F

## 2021-09-14 PROCEDURE — 99212 OFFICE O/P EST SF 10 MIN: CPT

## 2021-09-14 PROCEDURE — G0463: CPT

## 2021-09-17 ENCOUNTER — APPOINTMENT (OUTPATIENT)
Dept: MATERNAL FETAL MEDICINE | Facility: CLINIC | Age: 36
End: 2021-09-17

## 2021-09-23 NOTE — HISTORY OF PRESENT ILLNESS
[Postpartum Follow Up] : postpartum follow up [Delivery Date: ___] : on [unfilled] [Repeat C/S] : delivered by  section (repeat) [Female] : Delivery History: baby girl [Wt. ___] : weighing [unfilled] [None] : No associated symptoms are reported [Clean/Dry/Intact] : clean, dry and intact [Healed] : healed [Mild] : mild vaginal bleeding [Doing Well] : is doing well [Breastfeeding] : currently nursing [Complications:___] : no complications [BF with Difficulty] : nursing without difficulty [Erythema] : not erythematous [Swelling] : not swollen [Dehiscence] : not dehisced [de-identified] : f/u in clinic in 4 wks for 6wk apt, 10 wks for next depo-provera injection

## 2021-10-12 ENCOUNTER — APPOINTMENT (OUTPATIENT)
Dept: MATERNAL FETAL MEDICINE | Facility: CLINIC | Age: 36
End: 2021-10-12

## 2021-10-12 ENCOUNTER — EMERGENCY (EMERGENCY)
Facility: HOSPITAL | Age: 36
LOS: 1 days | Discharge: HOME | End: 2021-10-12
Attending: EMERGENCY MEDICINE
Payer: MEDICAID

## 2021-10-12 ENCOUNTER — APPOINTMENT (OUTPATIENT)
Dept: MATERNAL FETAL MEDICINE | Facility: CLINIC | Age: 36
End: 2021-10-12
Payer: MEDICAID

## 2021-10-12 ENCOUNTER — OUTPATIENT (OUTPATIENT)
Dept: OUTPATIENT SERVICES | Facility: HOSPITAL | Age: 36
LOS: 1 days | End: 2021-10-12
Payer: MEDICAID

## 2021-10-12 VITALS
TEMPERATURE: 98 F | OXYGEN SATURATION: 100 % | WEIGHT: 156.09 LBS | HEART RATE: 81 BPM | DIASTOLIC BLOOD PRESSURE: 85 MMHG | HEIGHT: 63 IN | RESPIRATION RATE: 15 BRPM | SYSTOLIC BLOOD PRESSURE: 124 MMHG

## 2021-10-12 VITALS
HEIGHT: 61 IN | DIASTOLIC BLOOD PRESSURE: 72 MMHG | OXYGEN SATURATION: 99 % | TEMPERATURE: 98.3 F | HEART RATE: 70 BPM | SYSTOLIC BLOOD PRESSURE: 110 MMHG

## 2021-10-12 VITALS
TEMPERATURE: 99 F | RESPIRATION RATE: 18 BRPM | SYSTOLIC BLOOD PRESSURE: 127 MMHG | OXYGEN SATURATION: 100 % | HEART RATE: 75 BPM | DIASTOLIC BLOOD PRESSURE: 81 MMHG

## 2021-10-12 DIAGNOSIS — O09.899 SUPERVISION OF OTHER HIGH RISK PREGNANCIES, UNSPECIFIED TRIMESTER: ICD-10-CM

## 2021-10-12 DIAGNOSIS — O09.511 SUPERVISION OF ELDERLY PRIMIGRAVIDA, FIRST TRIMESTER: ICD-10-CM

## 2021-10-12 DIAGNOSIS — Z34.90 ENCOUNTER FOR SUPERVISION OF NORMAL PREGNANCY, UNSPECIFIED, UNSPECIFIED TRIMESTER: ICD-10-CM

## 2021-10-12 DIAGNOSIS — O24.112 PRE-EXISTING TYPE 2 DIABETES MELLITUS, IN PREGNANCY, SECOND TRIMESTER: ICD-10-CM

## 2021-10-12 LAB
ALBUMIN SERPL ELPH-MCNC: 4.8 G/DL — SIGNIFICANT CHANGE UP (ref 3.3–5)
ALP SERPL-CCNC: 118 U/L — SIGNIFICANT CHANGE UP (ref 40–120)
ALT FLD-CCNC: 46 U/L — HIGH (ref 10–45)
ANION GAP SERPL CALC-SCNC: 14 MMOL/L — SIGNIFICANT CHANGE UP (ref 5–17)
APPEARANCE UR: CLEAR — SIGNIFICANT CHANGE UP
AST SERPL-CCNC: 38 U/L — SIGNIFICANT CHANGE UP (ref 10–40)
BACTERIA # UR AUTO: NEGATIVE — SIGNIFICANT CHANGE UP
BASE EXCESS BLDV CALC-SCNC: -1.2 MMOL/L — SIGNIFICANT CHANGE UP (ref -2–2)
BASOPHILS # BLD AUTO: 0.03 K/UL — SIGNIFICANT CHANGE UP (ref 0–0.2)
BASOPHILS NFR BLD AUTO: 0.4 % — SIGNIFICANT CHANGE UP (ref 0–2)
BILIRUB SERPL-MCNC: 0.3 MG/DL — SIGNIFICANT CHANGE UP (ref 0.2–1.2)
BILIRUB UR-MCNC: NEGATIVE — SIGNIFICANT CHANGE UP
BUN SERPL-MCNC: 12 MG/DL — SIGNIFICANT CHANGE UP (ref 7–23)
CA-I SERPL-SCNC: 1.22 MMOL/L — SIGNIFICANT CHANGE UP (ref 1.15–1.33)
CALCIUM SERPL-MCNC: 9.7 MG/DL — SIGNIFICANT CHANGE UP (ref 8.4–10.5)
CHLORIDE BLDV-SCNC: 104 MMOL/L — SIGNIFICANT CHANGE UP (ref 96–108)
CHLORIDE SERPL-SCNC: 105 MMOL/L — SIGNIFICANT CHANGE UP (ref 96–108)
CO2 BLDV-SCNC: 26 MMOL/L — SIGNIFICANT CHANGE UP (ref 22–26)
CO2 SERPL-SCNC: 21 MMOL/L — LOW (ref 22–31)
COLOR SPEC: SIGNIFICANT CHANGE UP
CREAT SERPL-MCNC: 0.74 MG/DL — SIGNIFICANT CHANGE UP (ref 0.5–1.3)
DIFF PNL FLD: ABNORMAL
EOSINOPHIL # BLD AUTO: 0.15 K/UL — SIGNIFICANT CHANGE UP (ref 0–0.5)
EOSINOPHIL NFR BLD AUTO: 2 % — SIGNIFICANT CHANGE UP (ref 0–6)
EPI CELLS # UR: 1 /HPF — SIGNIFICANT CHANGE UP
GAS PNL BLDV: 137 MMOL/L — SIGNIFICANT CHANGE UP (ref 136–145)
GAS PNL BLDV: SIGNIFICANT CHANGE UP
GAS PNL BLDV: SIGNIFICANT CHANGE UP
GLUCOSE BLDV-MCNC: 137 MG/DL — HIGH (ref 70–99)
GLUCOSE SERPL-MCNC: 132 MG/DL — HIGH (ref 70–99)
GLUCOSE UR QL: NEGATIVE — SIGNIFICANT CHANGE UP
HCO3 BLDV-SCNC: 25 MMOL/L — SIGNIFICANT CHANGE UP (ref 22–29)
HCT VFR BLD CALC: 39.8 % — SIGNIFICANT CHANGE UP (ref 34.5–45)
HCT VFR BLDA CALC: 36 % — SIGNIFICANT CHANGE UP (ref 34.5–46.5)
HGB BLD CALC-MCNC: 11.9 G/DL — SIGNIFICANT CHANGE UP (ref 11.7–16.1)
HGB BLD-MCNC: 12.5 G/DL — SIGNIFICANT CHANGE UP (ref 11.5–15.5)
HYALINE CASTS # UR AUTO: 1 /LPF — SIGNIFICANT CHANGE UP (ref 0–2)
IMM GRANULOCYTES NFR BLD AUTO: 0.4 % — SIGNIFICANT CHANGE UP (ref 0–1.5)
KETONES UR-MCNC: NEGATIVE — SIGNIFICANT CHANGE UP
LACTATE BLDV-MCNC: 1.3 MMOL/L — SIGNIFICANT CHANGE UP (ref 0.7–2)
LEUKOCYTE ESTERASE UR-ACNC: NEGATIVE — SIGNIFICANT CHANGE UP
LYMPHOCYTES # BLD AUTO: 2.49 K/UL — SIGNIFICANT CHANGE UP (ref 1–3.3)
LYMPHOCYTES # BLD AUTO: 33.1 % — SIGNIFICANT CHANGE UP (ref 13–44)
MCHC RBC-ENTMCNC: 26.4 PG — LOW (ref 27–34)
MCHC RBC-ENTMCNC: 31.4 GM/DL — LOW (ref 32–36)
MCV RBC AUTO: 84 FL — SIGNIFICANT CHANGE UP (ref 80–100)
MONOCYTES # BLD AUTO: 0.44 K/UL — SIGNIFICANT CHANGE UP (ref 0–0.9)
MONOCYTES NFR BLD AUTO: 5.9 % — SIGNIFICANT CHANGE UP (ref 2–14)
NEUTROPHILS # BLD AUTO: 4.38 K/UL — SIGNIFICANT CHANGE UP (ref 1.8–7.4)
NEUTROPHILS NFR BLD AUTO: 58.2 % — SIGNIFICANT CHANGE UP (ref 43–77)
NITRITE UR-MCNC: NEGATIVE — SIGNIFICANT CHANGE UP
NRBC # BLD: 0 /100 WBCS — SIGNIFICANT CHANGE UP (ref 0–0)
PCO2 BLDV: 46 MMHG — HIGH (ref 39–42)
PH BLDV: 7.34 — SIGNIFICANT CHANGE UP (ref 7.32–7.43)
PH UR: 6 — SIGNIFICANT CHANGE UP (ref 5–8)
PLATELET # BLD AUTO: 291 K/UL — SIGNIFICANT CHANGE UP (ref 150–400)
PO2 BLDV: 32 MMHG — SIGNIFICANT CHANGE UP (ref 25–45)
POTASSIUM BLDV-SCNC: 4.2 MMOL/L — SIGNIFICANT CHANGE UP (ref 3.5–5.1)
POTASSIUM SERPL-MCNC: 4.5 MMOL/L — SIGNIFICANT CHANGE UP (ref 3.5–5.3)
POTASSIUM SERPL-SCNC: 4.5 MMOL/L — SIGNIFICANT CHANGE UP (ref 3.5–5.3)
PROT SERPL-MCNC: 7.4 G/DL — SIGNIFICANT CHANGE UP (ref 6–8.3)
PROT UR-MCNC: ABNORMAL
RBC # BLD: 4.74 M/UL — SIGNIFICANT CHANGE UP (ref 3.8–5.2)
RBC # FLD: 12.6 % — SIGNIFICANT CHANGE UP (ref 10.3–14.5)
RBC CASTS # UR COMP ASSIST: 705 /HPF — HIGH (ref 0–4)
SAO2 % BLDV: 62.5 % — LOW (ref 67–88)
SODIUM SERPL-SCNC: 140 MMOL/L — SIGNIFICANT CHANGE UP (ref 135–145)
SP GR SPEC: 1.02 — SIGNIFICANT CHANGE UP (ref 1.01–1.02)
UROBILINOGEN FLD QL: NEGATIVE — SIGNIFICANT CHANGE UP
WBC # BLD: 7.52 K/UL — SIGNIFICANT CHANGE UP (ref 3.8–10.5)
WBC # FLD AUTO: 7.52 K/UL — SIGNIFICANT CHANGE UP (ref 3.8–10.5)
WBC UR QL: 4 /HPF — SIGNIFICANT CHANGE UP (ref 0–5)

## 2021-10-12 PROCEDURE — 85014 HEMATOCRIT: CPT

## 2021-10-12 PROCEDURE — 99284 EMERGENCY DEPT VISIT MOD MDM: CPT | Mod: 25

## 2021-10-12 PROCEDURE — 84295 ASSAY OF SERUM SODIUM: CPT

## 2021-10-12 PROCEDURE — 82803 BLOOD GASES ANY COMBINATION: CPT

## 2021-10-12 PROCEDURE — 76830 TRANSVAGINAL US NON-OB: CPT

## 2021-10-12 PROCEDURE — 82435 ASSAY OF BLOOD CHLORIDE: CPT

## 2021-10-12 PROCEDURE — 99285 EMERGENCY DEPT VISIT HI MDM: CPT

## 2021-10-12 PROCEDURE — 93975 VASCULAR STUDY: CPT | Mod: 26

## 2021-10-12 PROCEDURE — 81001 URINALYSIS AUTO W/SCOPE: CPT

## 2021-10-12 PROCEDURE — 82330 ASSAY OF CALCIUM: CPT

## 2021-10-12 PROCEDURE — 84132 ASSAY OF SERUM POTASSIUM: CPT

## 2021-10-12 PROCEDURE — 96374 THER/PROPH/DIAG INJ IV PUSH: CPT

## 2021-10-12 PROCEDURE — 83605 ASSAY OF LACTIC ACID: CPT

## 2021-10-12 PROCEDURE — 99213 OFFICE O/P EST LOW 20 MIN: CPT

## 2021-10-12 PROCEDURE — 82947 ASSAY GLUCOSE BLOOD QUANT: CPT

## 2021-10-12 PROCEDURE — 85018 HEMOGLOBIN: CPT

## 2021-10-12 PROCEDURE — 93975 VASCULAR STUDY: CPT

## 2021-10-12 PROCEDURE — 76830 TRANSVAGINAL US NON-OB: CPT | Mod: 26

## 2021-10-12 PROCEDURE — 80053 COMPREHEN METABOLIC PANEL: CPT

## 2021-10-12 PROCEDURE — 85025 COMPLETE CBC W/AUTO DIFF WBC: CPT

## 2021-10-12 PROCEDURE — G0463: CPT

## 2021-10-12 RX ORDER — KETOROLAC TROMETHAMINE 30 MG/ML
15 SYRINGE (ML) INJECTION ONCE
Refills: 0 | Status: DISCONTINUED | OUTPATIENT
Start: 2021-10-12 | End: 2021-10-12

## 2021-10-12 RX ADMIN — Medication 15 MILLIGRAM(S): at 12:03

## 2021-10-12 NOTE — ED PROVIDER NOTE - PHYSICAL EXAMINATION
General: non-toxic, NAD  HEENT: NCAT, PERRL  Cardiac: RRR, no murmurs, 2+ peripheral pulses  Resp: CTAB  Abdomen: +ttp along surgical site soft, non-distended, bowel sounds present, no rebound or guarding. no organomegaly  Extremities: no peripheral edema, calf tenderness, or leg size discrepancies  Skin: csx scar clean dry and intact no crepitus.   Neuro: AAOx4, 5+motor, sensation grossly intact  Psych: mood and affect appropriate

## 2021-10-12 NOTE — ED PROVIDER NOTE - PROGRESS NOTE DETAILS
pt pain resolved. comfortable ap- pt signed out to me pending reassessment, states she has no abdominal pain, is requesting DC, she is reexamined, denies nausea no vomiting, no fevers, counselled on return precatuions. Aware she has an ovarian cyst, will dc w/ outpt obgyn follow up

## 2021-10-12 NOTE — ED PROVIDER NOTE - PATIENT PORTAL LINK FT
You can access the FollowMyHealth Patient Portal offered by [facility] by registering at the following website: [email or web address]. By joining [employer]’s FollowMyHealth portal, you will also be able to view your health information using other applications (apps) compatible with our system.

## 2021-10-12 NOTE — ED PROVIDER NOTE - OBJECTIVE STATEMENT
h/o DM2 presents from her OB office for persistent vaginal bleeding s/p csx on 9/1 recommended pain control and US. Bleeding associated with lower abdominal pain and tremulousness x3w. The blood was dark, but today became bright red. No fever/CP/sob/lightheadedness/numbness/tingling/syncope or other sources of bleeding.

## 2021-10-12 NOTE — ED ADULT NURSE NOTE - OBJECTIVE STATEMENT
37 y/o female arrives to the ER  ambulatory complaining of abdominal pain. PMH of DM. Pt is A&Ox4, speaking coherently. Pt states having RLQ abdominal pain since Sept, pt reports being pregnant when the pain started. Sept 1 pt had a C section, after that  developed heavy bleeding that is not constant, soaking up to four pads daily. Report feeling fatigue, weakness. Report having RLQ pain that is stabbing and constant Report taking Tylenol for pain with minor relief. Pt went to OBGYN today and was referred to go to ER for ultrasound and further evaluation. Pt denies SOB, chest pain, dizziness, N/V/D, urinary symptoms, fevers, chills.  On assessment airway is patent, breathing spontaneously and unlabored. Skin is dry, warm and color appropriate to race.  Abdomen is soft, no distended, no tender. Full ROM in all extremities.  Patient undressed and placed into gown, side rails up with bed locked and in lowest position for safety. call bell within reach. [place] provided. Comfort and safety provided. Educated not to ambulate without assistance. will continue to reassess. 37 y/o female arrives to the ER  ambulatory complaining of abdominal pain. PMH of DM. Pt is A&Ox4, speaking coherently. Pt states having RLQ abdominal pain since Sept, pt reports being pregnant when the pain started. Sept 1 pt had a C section, after that  developed heavy bleeding that is not constant, soaking up to four pads daily. Report feeling fatigue, weakness. Report having RLQ pain that is stabbing and constant Report taking Tylenol for pain with minor relief. Pt went to OBGYN today and was referred to go to ER for ultrasound and further evaluation. Pt denies SOB, chest pain, dizziness, N/V/D, urinary symptoms, fevers, chills.  On assessment airway is patent, breathing spontaneously and unlabored. Skin is dry, warm and color appropriate to race.  Abdomen is soft, no distended, tender to palpation. Full ROM in all extremities.  Patient undressed and placed into gown, side rails up with bed locked and in lowest position for safety. call bell within reach. [place] provided. Comfort and safety provided. Educated not to ambulate without assistance. will continue to reassess.

## 2021-10-12 NOTE — ED PROVIDER NOTE - NSFOLLOWUPINSTRUCTIONS_ED_ALL_ED_FT
Follow up with your obgyn over the next week.     Abdominal/pelvic pain is a very common reason for people to visit the emergency room. Frequently the exact cause of these painful symptoms is not diagnosed in the ED. Very often, your emergency provider will focus on ruling out a dangerous diagnosis and trying to help you feel better. Luckily most abdominal/pelvic pain gets better with time and rest. In rare circumstances, abdominal/pelvic pain can be a sign of a much more severe medical condition.     HOME CARE INSTRUCTIONS   - rest  - drink plenty of non-caffeinated non-alcoholic fluids. Keep in mind salt restrictions if you are eating store bought soups  - avoid excessive motrin / Advil / ibuprofen (NSAID's) as these can make abdominal pain worse. Tylenol is a good choice for pain (make sure that you follow dosage guidelines)  - avoid fatty or spicy foods  - make an appointment to see your doctor  - If you have vomiting or diarrhea - DO NOT go to work while you have these contagious symptoms     RETURN TO THE EMERGENCY ROOM RIGHT AWAY  - IF YOUR PAIN CHANGES OR GETS WORSE  - IF YOU HAVE NAUSEA AND VOMITING THAT PERSISTS AND YOU CANNOT KEEP DOWN SOLIDS OR LIQUIDS  - YOUR PAIN RADIATES TO YOUR BACK  - YOU HAVE CHEST PAIN, SHORTNESS OF BREATH OR DIZZINESS  - IF YOU FEEL DIZZY OR WEAK  - IF YOU HAVE CHEST PAIN  - YOU HAVE BLEEDING FROM YOUR RECTUM OR DARK OR TARRY/STICKY STOOLS  - YOU HAVE BLEEDING FROM YOUR PENIS OR VAGINA OR BLOOD IN YOUR URINE  - YOUR PAIN IS NOT IMPROVING IN 24 HOURS  - YOU FEEL SICK OR HAVE CONCERNS

## 2021-10-12 NOTE — ED PROVIDER NOTE - CLINICAL SUMMARY MEDICAL DECISION MAKING FREE TEXT BOX
pt with DM2 presenting for abd pain and persistent vaginal bleeding after a csx last month. assess for coagulopathy and retained products. pain control and rehydrate.

## 2021-10-12 NOTE — ED PROVIDER NOTE - NS ED ROS FT
Constitutional: no fevers, chills  HEENT: no HA, vision changes, rhinorrhea, sore throat  Cardiac: no chest pain, palpitations, lightheadedness  Respiratory: no SOB, cough or hemoptysis  GI: +abd pain, no n/v/d/c, bloody or dark stools  : no dysuria, frequency, or hematuria  MSK: no joint pain, neck pain or back pain  Skin: no rashes, jaundice, pruritis  Neuro: no numbness/tingling, weakness, unsteady gait  Psych: no depression or suicidal thoughts

## 2021-10-12 NOTE — ED PROVIDER NOTE - ATTENDING CONTRIBUTION TO CARE
Patient presenting complaining of R sided pelvic pain and worsening vaginal bleeding.  Delivered by C section about one month ago.  Pains from surgery and vaginal bleeding had been decreasing, reporting pain worsened yesterday with increased bleeding today.  Saw her OBGYN who sent her to the Emergency Department for pain management and ultrasound.  Reporting took APAP yesterday with some relief.  Denying fevers, chills (does endorse feeling "shaky"), chest pains, shortness of breath, nausea, vomiting, urinary symptoms.    A 14 point review of systems is negative except as in HPI or otherwise documented.    Exam:  General: Patient well appearing, vital signs within normal limits  HEENT: airway patent with moist mucous membranes  Cardiac: RRR S1/S2 with strong peripheral pulses  Respiratory: lungs clear without respiratory distress  GI: abdomen soft, R pelvic tenderness exteriorly, non distended  Neuro: no gross neurologic deficits  Skin: warm, well perfused  Psych: normal mood and affect    Patient presenting with pelvic pains and increased bleeding about 40 days after prior C section, could be return of menses?  Appears non toxic.  Plan for labs, pain control, TVUS and re-evaluate.

## 2021-10-19 ENCOUNTER — APPOINTMENT (OUTPATIENT)
Dept: MATERNAL FETAL MEDICINE | Facility: CLINIC | Age: 36
End: 2021-10-19
Payer: MEDICAID

## 2021-10-19 ENCOUNTER — OUTPATIENT (OUTPATIENT)
Dept: OUTPATIENT SERVICES | Facility: HOSPITAL | Age: 36
LOS: 1 days | End: 2021-10-19
Payer: MEDICAID

## 2021-10-19 ENCOUNTER — LABORATORY RESULT (OUTPATIENT)
Age: 36
End: 2021-10-19

## 2021-10-19 VITALS
DIASTOLIC BLOOD PRESSURE: 70 MMHG | HEIGHT: 61 IN | OXYGEN SATURATION: 98 % | SYSTOLIC BLOOD PRESSURE: 112 MMHG | HEART RATE: 80 BPM

## 2021-10-19 DIAGNOSIS — R10.30 LOWER ABDOMINAL PAIN, UNSPECIFIED: ICD-10-CM

## 2021-10-19 DIAGNOSIS — O09.899 SUPERVISION OF OTHER HIGH RISK PREGNANCIES, UNSPECIFIED TRIMESTER: ICD-10-CM

## 2021-10-19 PROCEDURE — G0463: CPT

## 2021-10-19 PROCEDURE — 87800 DETECT AGNT MULT DNA DIREC: CPT

## 2021-10-19 PROCEDURE — 99212 OFFICE O/P EST SF 10 MIN: CPT

## 2021-10-19 PROCEDURE — 36415 COLL VENOUS BLD VENIPUNCTURE: CPT

## 2021-10-19 RX ORDER — AMOXICILLIN AND CLAVULANATE POTASSIUM 875; 125 MG/1; MG/1
875-125 TABLET, COATED ORAL
Qty: 14 | Refills: 0 | Status: DISCONTINUED | COMMUNITY
Start: 2021-10-19 | End: 2021-10-19

## 2021-10-19 RX ORDER — METRONIDAZOLE 500 MG/1
500 TABLET ORAL TWICE DAILY
Qty: 14 | Refills: 0 | Status: DISCONTINUED | COMMUNITY
Start: 2021-10-19 | End: 2021-10-19

## 2021-10-19 RX ORDER — PNV,CALCIUM 72/IRON/FOLIC ACID 27 MG-1 MG
27-1 TABLET ORAL DAILY
Qty: 30 | Refills: 3 | Status: ACTIVE | COMMUNITY
Start: 2021-10-19 | End: 1900-01-01

## 2021-10-20 LAB
ALBUMIN SERPL ELPH-MCNC: 4.9 G/DL
ALP BLD-CCNC: 109 U/L
ALT SERPL-CCNC: 40 U/L
ANION GAP SERPL CALC-SCNC: 14 MMOL/L
AST SERPL-CCNC: 31 U/L
BASOPHILS # BLD AUTO: 0.02 K/UL
BASOPHILS NFR BLD AUTO: 0.3 %
BILIRUB SERPL-MCNC: 0.6 MG/DL
BUN SERPL-MCNC: 9 MG/DL
CALCIUM SERPL-MCNC: 9.6 MG/DL
CANDIDA AB TITR SER: SIGNIFICANT CHANGE UP
CHLORIDE SERPL-SCNC: 102 MMOL/L
CO2 SERPL-SCNC: 22 MMOL/L
CREAT SERPL-MCNC: 0.76 MG/DL
EOSINOPHIL # BLD AUTO: 0.12 K/UL
EOSINOPHIL NFR BLD AUTO: 1.8 %
ESTIMATED AVERAGE GLUCOSE: 131 MG/DL
G VAGINALIS DNA SPEC QL NAA+PROBE: SIGNIFICANT CHANGE UP
GLUCOSE SERPL-MCNC: 135 MG/DL
HBA1C MFR BLD HPLC: 6.2 %
HCT VFR BLD CALC: 41.3 %
HGB BLD-MCNC: 12.7 G/DL
IMM GRANULOCYTES NFR BLD AUTO: 0.1 %
LYMPHOCYTES # BLD AUTO: 2.31 K/UL
LYMPHOCYTES NFR BLD AUTO: 34.6 %
MAN DIFF?: NORMAL
MCHC RBC-ENTMCNC: 26.5 PG
MCHC RBC-ENTMCNC: 30.8 GM/DL
MCV RBC AUTO: 86 FL
MONOCYTES # BLD AUTO: 0.41 K/UL
MONOCYTES NFR BLD AUTO: 6.1 %
NEUTROPHILS # BLD AUTO: 3.8 K/UL
NEUTROPHILS NFR BLD AUTO: 57.1 %
PLATELET # BLD AUTO: 322 K/UL
POTASSIUM SERPL-SCNC: 4.8 MMOL/L
PROT SERPL-MCNC: 7.3 G/DL
RBC # BLD: 4.8 M/UL
RBC # FLD: 13.3 %
SODIUM SERPL-SCNC: 137 MMOL/L
T VAGINALIS SPEC QL WET PREP: SIGNIFICANT CHANGE UP
WBC # FLD AUTO: 6.67 K/UL

## 2021-10-21 ENCOUNTER — OUTPATIENT (OUTPATIENT)
Dept: OUTPATIENT SERVICES | Facility: HOSPITAL | Age: 36
LOS: 1 days | End: 2021-10-21
Payer: MEDICAID

## 2021-10-21 ENCOUNTER — APPOINTMENT (OUTPATIENT)
Dept: CT IMAGING | Facility: HOSPITAL | Age: 36
End: 2021-10-21
Payer: MEDICAID

## 2021-10-21 DIAGNOSIS — O09.90 SUPERVISION OF HIGH RISK PREGNANCY, UNSPECIFIED, UNSPECIFIED TRIMESTER: ICD-10-CM

## 2021-10-21 PROCEDURE — 74177 CT ABD & PELVIS W/CONTRAST: CPT

## 2021-10-21 PROCEDURE — 82565 ASSAY OF CREATININE: CPT

## 2021-10-21 PROCEDURE — 74177 CT ABD & PELVIS W/CONTRAST: CPT | Mod: 26

## 2021-10-22 ENCOUNTER — APPOINTMENT (OUTPATIENT)
Dept: MATERNAL FETAL MEDICINE | Facility: CLINIC | Age: 36
End: 2021-10-22
Payer: MEDICAID

## 2021-10-22 LAB
C TRACH RRNA SPEC QL NAA+PROBE: NOT DETECTED
N GONORRHOEA RRNA SPEC QL NAA+PROBE: NOT DETECTED
SOURCE AMPLIFICATION: NORMAL
VZV AB TITR SER: POSITIVE
VZV IGG SER IF-ACNC: 610.3 INDEX
VZV IGM SER IF-ACNC: <0.91 INDEX

## 2021-10-26 ENCOUNTER — OUTPATIENT (OUTPATIENT)
Dept: OUTPATIENT SERVICES | Facility: HOSPITAL | Age: 36
LOS: 1 days | End: 2021-10-26
Payer: MEDICAID

## 2021-10-26 ENCOUNTER — APPOINTMENT (OUTPATIENT)
Dept: MATERNAL FETAL MEDICINE | Facility: CLINIC | Age: 36
End: 2021-10-26
Payer: MEDICAID

## 2021-10-26 VITALS
DIASTOLIC BLOOD PRESSURE: 60 MMHG | BODY MASS INDEX: 29.9 KG/M2 | HEART RATE: 84 BPM | WEIGHT: 158.38 LBS | OXYGEN SATURATION: 99 % | HEIGHT: 61 IN | SYSTOLIC BLOOD PRESSURE: 110 MMHG

## 2021-10-26 DIAGNOSIS — O09.899 SUPERVISION OF OTHER HIGH RISK PREGNANCIES, UNSPECIFIED TRIMESTER: ICD-10-CM

## 2021-10-26 LAB
BILIRUB UR QL STRIP: NORMAL
GLUCOSE UR-MCNC: NORMAL
HCG UR QL: 0.2 EU/DL
HGB UR QL STRIP.AUTO: NORMAL
KETONES UR-MCNC: NORMAL
LEUKOCYTE ESTERASE UR QL STRIP: NORMAL
NITRITE UR QL STRIP: NORMAL
PH UR STRIP: 6
PROT UR STRIP-MCNC: NORMAL
SP GR UR STRIP: 1.02

## 2021-10-26 PROCEDURE — G0463: CPT

## 2021-10-26 PROCEDURE — 99213 OFFICE O/P EST LOW 20 MIN: CPT

## 2021-11-05 DIAGNOSIS — R10.31 RIGHT LOWER QUADRANT PAIN: ICD-10-CM

## 2021-11-16 LAB — SURGICAL PATHOLOGY STUDY: SIGNIFICANT CHANGE UP

## 2021-11-17 ENCOUNTER — APPOINTMENT (OUTPATIENT)
Dept: ENDOCRINOLOGY | Facility: CLINIC | Age: 36
End: 2021-11-17
Payer: MEDICAID

## 2021-11-17 VITALS — WEIGHT: 162.6 LBS | BODY MASS INDEX: 30.7 KG/M2 | HEIGHT: 61 IN

## 2021-11-17 LAB — HBA1C MFR BLD HPLC: 6.9

## 2021-11-17 PROCEDURE — 83036 HEMOGLOBIN GLYCOSYLATED A1C: CPT | Mod: QW

## 2021-11-17 PROCEDURE — G0108 DIAB MANAGE TRN  PER INDIV: CPT

## 2021-11-18 DIAGNOSIS — O24.319 UNSPECIFIED PRE-EXISTING DIABETES MELLITUS IN PREGNANCY, UNSPECIFIED TRIMESTER: ICD-10-CM

## 2021-11-18 DIAGNOSIS — O09.90 SUPERVISION OF HIGH RISK PREGNANCY, UNSPECIFIED, UNSPECIFIED TRIMESTER: ICD-10-CM

## 2021-11-18 DIAGNOSIS — O99.210 OBESITY COMPLICATING PREGNANCY, UNSPECIFIED TRIMESTER: ICD-10-CM

## 2022-02-10 NOTE — ED ADULT NURSE NOTE - NSFALLRSKPASTHIST_ED_ALL_ED
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

## 2022-03-21 NOTE — OB RN PATIENT PROFILE - ALERT: PERTINENT HISTORY
PCP: Geni Jacinto NP    Last appt: 3/4/2022  Future Appointments   Date Time Provider Alberto [place]   4/15/2022  2:15 PM Geni Jacinto NP [facility] AMB       Requested Prescriptions     Pending Prescriptions Disp Refills    carvediloL (COREG) 25 mg tablet 180 Tablet 1     Sig: Take 1 Tablet by mouth two (2) times daily (with meals).        Prior labs and Blood pressures:  BP Readings from Last 3 Encounters:   03/04/22 (!) 174/102   01/05/22 (!) 147/98   11/29/21 138/88     Lab Results   Component Value Date/Time    Sodium 138 03/04/2022 03:17 PM    Potassium 3.6 03/04/2022 03:17 PM    Chloride 106 03/04/2022 03:17 PM    CO2 28 03/04/2022 03:17 PM    Anion gap 4 (L) 03/04/2022 03:17 PM    Glucose 133 (H) 03/04/2022 03:17 PM    BUN 23 (H) 03/04/2022 03:17 PM    Creatinine 1.33 (H) 03/04/2022 03:17 PM    BUN/Creatinine ratio 17 03/04/2022 03:17 PM    GFR est AA >60 03/04/2022 03:17 PM    GFR est non-AA 56 (L) 03/04/2022 03:17 PM    Calcium 9.9 03/04/2022 03:17 PM     Lab Results   Component Value Date/Time    Hemoglobin A1c 6.6 (H) 03/04/2022 03:17 PM     Lab Results   Component Value Date/Time    Cholesterol, total 164 11/29/2021 03:15 PM    HDL Cholesterol 40 11/29/2021 03:15 PM    LDL, calculated 72 11/29/2021 03:15 PM    VLDL, calculated 52 11/29/2021 03:15 PM    Triglyceride 260 (H) 11/29/2021 03:15 PM    CHOL/HDL Ratio 4.1 11/29/2021 03:15 PM     No results found for: NICHOLAS Gray    Lab Results   Component Value Date/Time    TSH 1.93 11/29/2021 03:15 PM    TSH, 3rd generation 1.43 02/01/2021 02:03 PM
1st Trimester Sonogram/20 Week Level II Sonogram

## 2022-04-29 ENCOUNTER — APPOINTMENT (OUTPATIENT)
Dept: ENDOCRINOLOGY | Facility: CLINIC | Age: 37
End: 2022-04-29
Payer: MEDICAID

## 2022-04-29 VITALS
WEIGHT: 171 LBS | BODY MASS INDEX: 32.28 KG/M2 | HEIGHT: 61 IN | OXYGEN SATURATION: 98 % | SYSTOLIC BLOOD PRESSURE: 110 MMHG | TEMPERATURE: 97.3 F | DIASTOLIC BLOOD PRESSURE: 76 MMHG | HEART RATE: 93 BPM

## 2022-04-29 LAB
GLUCOSE BLDC GLUCOMTR-MCNC: 310
HBA1C MFR BLD HPLC: 11.6

## 2022-04-29 PROCEDURE — 82962 GLUCOSE BLOOD TEST: CPT

## 2022-04-29 PROCEDURE — 99204 OFFICE O/P NEW MOD 45 MIN: CPT | Mod: 25

## 2022-04-29 PROCEDURE — 95251 CONT GLUC MNTR ANALYSIS I&R: CPT

## 2022-04-29 PROCEDURE — 95250 CONT GLUC MNTR PHYS/QHP EQP: CPT

## 2022-04-29 PROCEDURE — 83036 HEMOGLOBIN GLYCOSYLATED A1C: CPT | Mod: QW

## 2022-04-29 NOTE — PHYSICAL EXAM
[Alert] : alert [Well Nourished] : well nourished [No Acute Distress] : no acute distress [Well Developed] : well developed [Normal Sclera/Conjunctiva] : normal sclera/conjunctiva [EOMI] : extra ocular movement intact [No Proptosis] : no proptosis [Thyroid Not Enlarged] : the thyroid was not enlarged [No Thyroid Nodules] : no palpable thyroid nodules [No Respiratory Distress] : no respiratory distress [No Accessory Muscle Use] : no accessory muscle use [Clear to Auscultation] : lungs were clear to auscultation bilaterally [Normal S1, S2] : normal S1 and S2 [Normal Rate] : heart rate was normal [Regular Rhythm] : with a regular rhythm [No Edema] : no peripheral edema [Pedal Pulses Normal] : the pedal pulses are present [Normal Bowel Sounds] : normal bowel sounds [Not Tender] : non-tender [Not Distended] : not distended [Soft] : abdomen soft [Normal Anterior Cervical Nodes] : no anterior cervical lymphadenopathy [Normal Posterior Cervical Nodes] : no posterior cervical lymphadenopathy [No Spinal Tenderness] : no spinal tenderness [Spine Straight] : spine straight [No Stigmata of Cushings Syndrome] : no stigmata of Cushings Syndrome [Normal Gait] : normal gait [Normal Strength/Tone] : muscle strength and tone were normal [No Rash] : no rash [Normal Reflexes] : deep tendon reflexes were 2+ and symmetric [No Tremors] : no tremors [Oriented x3] : oriented to person, place, and time [Healthy Appearance] : healthy appearance [Normal Hearing] : hearing was normal [Acanthosis Nigricans] : no acanthosis nigricans

## 2022-04-29 NOTE — HISTORY OF PRESENT ILLNESS
[FreeTextEntry1] : 35 y/o female here for management of T2DM. Patient is Polish speaking. Called pacific  x 2 without response. Unable to connect to Language line  due to poor connection. With patient's consent used in-office MA as the . Patient was told all her conversations will be confidential. Patient offered consent to proceed with visit.\par \par DM was diagnosed 4 years ago\par No GDM with first pregnancy\par Second pregnancy Sept 2021 was delivery. \par She was on insulin during pregnancy. After delivery not on any medication\par \par Complications include: + neuropathy, last ophtho visit was 7 months ago and no retinopathy. Has blurry vision at this time. No h/o CAD or CKD. No nephropathy.\par Current DM Medications/Insulin: None\par Current Fingerstick Glucose Logs: Not checking, meter broke. Last checked was at the hospital in Sept 2021\par \par Current Diet Includes:\par Breakfast: skips\par Lunch: salad at work, sometimes bread\par Dinner: chicken, beef, tortilla\par Snacks: no snacks\par Drinks: No soda or juice\par \par Additional history:\par No meter at this time, it broke\par SH: 2 kids: 7 years and 7 month old baby. She works as baker\par

## 2022-04-29 NOTE — ASSESSMENT
[Carbohydrate Consistent Diet] : carbohydrate consistent diet [Importance of Diet and Exercise] : importance of diet and exercise to improve glycemic control, achieve weight loss and improve cardiovascular health [Hypoglycemia Management] : hypoglycemia management [Self Monitoring of Blood Glucose] : self monitoring of blood glucose [Sick-Day Management] : sick-day management [Retinopathy Screening] : Patient was referred to ophthalmology for retinopathy screening [Weight Loss] : weight loss [Diabetic Medications] : Risks and benefits of diabetic medications were discussed [FreeTextEntry1] : 35 y/o female with severely uncontrolled T2DM (A1c 11.6%) and obesity\par \par T2DM\par - I had a discussion regarding healthy diet (consistent carbohydrates) with the patient. I also emphasized to maintain routine exercise activity as tolerated.\par - Provided her with a glucometer and advised her to check BID in a staggered manner for goal \par - Inserted Tayler Pro at the visit today, return in 2 weeks\par - Start Metformin 500 mg BID and then 2 tabs BID after 1 week, take with food. If severe GI s/s call office\par - Start Glimepiride 2 mg QAM, may need more later, will assess after Tayler placement\par - We discussed that mild hypoglycemia (BG<70mg/dl) should be corrected with 4oz juice or 3 glucose tablets and then recheck glucose in 15 minutes\par - We discussed that if glycemic control does not improve by next visit, she is at risk for insulin needs\par - Recommend annual ophtho dilated eye exam\par - Labs to be checked at the next visit\par \par Obesity\par - I had a discussion regarding healthy diet (consistent carbohydrates) with the patient. I also emphasized to maintain routine exercise activity as tolerated.\par - We discussed that glycemic control is first priority at this time given A1c value\par - There are agents that can lower her weight such as GLP-1RA and SGLT2i that can be considered in the future with next endocrine NP or MD visit, once A1c has improved with above agents. These medications can be expensive and she needs urgent medications to improve glycemic control and not wait to find out approval status and delay her DM care\par \par I informed the patient that I will be leaving the practice. Recommend schedule f/u with next available endocrinologist in 4-6 months. F/u Endocrine NP in 2 months\par \par Magi Mckeon DO\par \par \par

## 2022-04-29 NOTE — REVIEW OF SYSTEMS
[Blurred Vision] : blurred vision [Pain/Numbness of Digits] : pain/numbness of digits [Negative] : Heme/Lymph [All other systems negative] : All other systems negative [Fatigue] : no fatigue

## 2022-05-31 ENCOUNTER — NON-APPOINTMENT (OUTPATIENT)
Age: 37
End: 2022-05-31

## 2022-06-29 ENCOUNTER — APPOINTMENT (OUTPATIENT)
Dept: ENDOCRINOLOGY | Facility: CLINIC | Age: 37
End: 2022-06-29

## 2022-08-09 NOTE — OB NEONATOLOGY/PEDIATRICIAN DELIVERY SUMMARY - NS_BIRTHTRAUMAA_OBGYN_ALL_OB
Eileen Ledesma  is being evaluated via a billable video visit.      How would you like to obtain your AVS? [place]  For the video visit, send the invitation by: Text to cell phone: [phone]  Will anyone else be joining your video visit? No         None

## 2022-08-12 NOTE — OB RN TRIAGE NOTE - SUICIDE SCREENING QUESTION 2
Date: 2022    PATIENT:  Abel Flores  :          2004  KARTHIK:          Aug 12, 2022    Dear Carrol Hitchcock:    I had the pleasure of seeing your patient, Abel Flores, for a follow-up visit in the [facility] on Aug 12, 2022 at the [facility] in [place].  Abel was last seen in this clinic on 6/3/2022 and has had one additional RD visit since then.  Please see below for my assessment and plan of care.    Intercurrent History:  As you may recall, Lenin is a 18 year old boy with a BMI in the severe obesity range (defined as BMI > /120% of the 95th percentile or >/ 35 kg/m2) complicated by type 2 diabetes, elevated ALT measurement, and elevated BP. Lenin continues to take Victoza 1.8 mg daily. He notes that he did not take it as consistently last week because he was away for a musical theater camp for one week (took it about 3 times while there). He continues to go to the gym 3x/week. So far, he has focused mostly on lifting but is starting to add in cardio. The power lifting season will start in November. He has not made any particular dietary changes since his last RD visit (had a visit fairly recently, < 1 month ago).      Social History: Lenin will be starting 12th grade in the fall. He continues to work at [employer] - currently 30 hours/week but will decrease down to 15 hours per week once school starts.       Current Medications:  Current Outpatient Rx   Medication Sig Dispense Refill     ACCU-CHEK GUIDE test strip USE TO TEST BLOOD SUGAR 2 TIMES A DAY.       blood glucose monitoring (SOFTCLIX) lancets USE TO TEST BLOOD SUGAR 2 TIMES A DAY.       buPROPion (WELLBUTRIN XL) 300 MG 24 hr tablet Take 300 mg by mouth every morning       fexofenadine (ALLEGRA) 180 MG tablet Take 180 mg by mouth daily as needed PRN       ibuprofen (ADVIL/MOTRIN) 200 MG tablet Take 200-400 mg by mouth       insulin pen needle  "(32G X 4 MM) 32G X 4 MM miscellaneous Use 1 pen needles daily or as directed. 100 each 2     liraglutide (VICTOZA) 18 MG/3ML solution Inject 1.8 mg Subcutaneous daily 9 mL 3     Melatonin 10 MG CAPS Take 10 mg by mouth nightly as needed        Sharps Container MISC 1 Container daily 1 each 3     buPROPion (WELLBUTRIN XL) 150 MG 24 hr tablet Take 150 mg by mouth every morning  (Patient not taking: Reported on 8/12/2022)         Physical Exam:    Vitals:    B/P:   BP Readings from Last 1 Encounters:   08/12/22 128/78     BP:  Blood pressure percentiles are not available for patients who are 18 years or older.  P:   Pulse Readings from Last 1 Encounters:   08/12/22 85       Measured Weights:  Wt Readings from Last 4 Encounters:   08/12/22 148.9 kg (328 lb 4.2 oz) (>99 %, Z= 3.30)*   07/29/22 (!) 150.5 kg (331 lb 12.7 oz) (>99 %, Z= 3.33)*   06/03/22 147.2 kg (324 lb 8.3 oz) (>99 %, Z= 3.29)*   02/25/22 148.5 kg (327 lb 4.8 oz) (>99 %, Z= 3.34)*     * Growth percentiles are based on CDC (Boys, 2-20 Years) data.       Height:    Ht Readings from Last 4 Encounters:   08/12/22 1.859 m (6' 1.19\") (91 %, Z= 1.37)*   07/29/22 1.863 m (6' 1.35\") (92 %, Z= 1.43)*   06/03/22 1.859 m (6' 1.19\") (92 %, Z= 1.38)*   02/25/22 1.863 m (6' 1.35\") (93 %, Z= 1.46)*     * Growth percentiles are based on CDC (Boys, 2-20 Years) data.       Body Mass Index:  Body mass index is 43.09 kg/m .  Body Mass Index Percentile:  >99 %ile (Z= 2.90) based on CDC (Boys, 2-20 Years) BMI-for-age based on BMI available as of 8/12/2022.    Labs:    Results for orders placed or performed in visit on 08/12/22   Hemoglobin A1c POCT     Status: Abnormal   Result Value Ref Range    Hemoglobin A1C POCT 5.9 4.3 - <5.7 %        Assessment:  Abel is a 18 year old with a BMI in the severe obesity range (defined as BMI > /120% of the 95th percentile or >/ 35 kg/m2) complicated by type 2 diabetes, hepatic steatosis, and elevated BP. Repeat Hgb A1c today is within the " prediabetes range, despite use of Victoza 1.8 mg daily. During today's visit, we discussed other medication options, specifically transitioning to Ozempic (semaglutide). Ozempic is FDA approved for the treatment of Type 2 diabetes in adults and is likely to have a greater effect on Lenin's glycemic control and weight. We also briefly discussed metabolic and bariatric surgery as a treatment options. I explained to Lenin that bariatric surgery is a very effective treatment for type 2 diabetes. In the Teen-Longitudinal Assessment of Bariatric Surgery (Teen-LABS) study, 5-year post-operative outcomes showed that 86% of adolescents who had diabetes prior to surgery achieved remission, defined as a Hgb A1c <6.5% without using diabetes medication. Furthermore, adolescents were 27% more likely to achieve remission of their diabetes than the adult patients (Hanna et al. N Engl J Med 2019; 380:4223-7893). At this time, Lenin is not interested in pursuing surgery as an option. I am happy to answer any questions he may have about surgery at any time.       Abel s current problem list reviewed today includes:    Encounter Diagnoses   Name Primary?     Type 2 diabetes mellitus without complication, without long-term current use of insulin (H) Yes     Severe obesity (H)      Hepatic steatosis      Elevated BP without diagnosis of hypertension         Care Plan:  Severe Obesity: % of the 95th percentile  - Lifestyle modification therapy - continue goals set at last RD appointment  - Pharmacotherapy:   - Stay on the Victoza until you are able to  the Ozempic from the pharmacy   - Switch from Victoza 1.8 mg daily to Ozempic 0.5 mg once per week x 4 weeks    - After 0.5 mg weekly for four weeks, will plan to increase to 1.0 mg weekly   - Screening labs - last done 1/2022     Type 2 Diabetes: Hgb A1c now 5.9%; [facility]    - Continue weight management plan as noted above, including  transition to Ozempic     Hepatic Steatosis: NAFLD work-up complete; ALT down trending   - Continue weight management plan as noted above   - Recheck ALT in 6 months (due Dec 2022); complete US annually (due Jan 2023)     Elevated BP: BP elevated but within target range of < 130/80 mmHg today   - Continue weight management plan as noted above  - Continue to monitor at follow up appointments      We are looking forward to seeing Abel for a follow-up visit in 4-6 weeks.     Review of the result(s) of each unique test - Hgb A1c  Prescription drug management  30 minutes spent on the date of the encounter doing patient visit and documentation     Thank you for including me in the care of your patient.  Please do not hesitate to call with questions or concerns.    Sincerely,    Ana Rosa Akins MD, MS    American Board of Obesity Medicine Diplomate  Department of Pediatrics  [facility]              CC  Copy to patient  MarkHuongAmauri Coe  [id number]  [place] [address]   No

## 2022-08-25 ENCOUNTER — APPOINTMENT (OUTPATIENT)
Dept: ENDOCRINOLOGY | Facility: CLINIC | Age: 37
End: 2022-08-25

## 2022-08-25 VITALS
BODY MASS INDEX: 32.31 KG/M2 | SYSTOLIC BLOOD PRESSURE: 122 MMHG | OXYGEN SATURATION: 98 % | TEMPERATURE: 98.4 F | WEIGHT: 171 LBS | HEART RATE: 78 BPM | DIASTOLIC BLOOD PRESSURE: 88 MMHG

## 2022-08-25 PROCEDURE — 99214 OFFICE O/P EST MOD 30 MIN: CPT

## 2022-08-25 PROCEDURE — 83036 HEMOGLOBIN GLYCOSYLATED A1C: CPT | Mod: QW

## 2022-08-27 LAB — HBA1C MFR BLD HPLC: 9.3

## 2022-09-19 NOTE — OB RN PATIENT PROFILE - TERM DELIVERIES, OB PROFILE
2022  EMPLOYEE INFORMATION: EMPLOYER INFORMATION:   NAME: Caroline Wu [facility] ( ALL SITES)    : 1984 [phone]    DATE OF INJURY/EVENT: 2022            Location: [facility]   Treating Provider: Freya Shay PA-C  Time In:  2:58 PM Time Out:  3:23 PM      DIAGNOSIS:   1. Strain of lumbar region, initial encounter    2. Acute bilateral low back pain with right-sided sciatica      STATUS: This injury is undetermined as to causation at this time.    RETURN TO WORK:Employee may return to work with restrictions.   Return Date: 2022            RESTRICTIONS: Restrictions are to be followed at work and at home.  Restrictions are in effect until next follow-up visit.  Allow to sit and stand as needed  No push pull lift more than 20 pounds  Ice/heat for 20 minutes every 2 hours as needed  No bending kneeling squatting twisting     TREATMENT PLAN:   Medications for this injury/condition: Medrol dose carmita  Referral/Consult: None  Diagnostic Testing: None       Instructions: Ice, heat, massage, stretching    NEXT RETURN VISIT:   In person 2022 at 08:15am  Thank you for the privilege of providing medical care for this injury/condition.  If there are any questions, please call the occupational health clinic at Dept: [phone].    Electronically signed on 2022 at 3:23 PM by:   Freya Shay PA-C   [facility]    The physician below agrees with the plan and restrictions placed on the patient by the provider above.  Janie Krause DO         1

## 2022-11-28 ENCOUNTER — APPOINTMENT (OUTPATIENT)
Dept: ENDOCRINOLOGY | Facility: CLINIC | Age: 37
End: 2022-11-28

## 2023-02-23 ENCOUNTER — APPOINTMENT (OUTPATIENT)
Dept: ENDOCRINOLOGY | Facility: CLINIC | Age: 38
End: 2023-02-23
Payer: MEDICAID

## 2023-02-23 VITALS
OXYGEN SATURATION: 96 % | HEART RATE: 104 BPM | BODY MASS INDEX: 31.15 KG/M2 | WEIGHT: 165 LBS | SYSTOLIC BLOOD PRESSURE: 110 MMHG | DIASTOLIC BLOOD PRESSURE: 72 MMHG | HEIGHT: 61 IN

## 2023-02-23 DIAGNOSIS — R79.89 OTHER SPECIFIED ABNORMAL FINDINGS OF BLOOD CHEMISTRY: ICD-10-CM

## 2023-02-23 DIAGNOSIS — E66.9 OBESITY, UNSPECIFIED: ICD-10-CM

## 2023-02-23 PROCEDURE — 99214 OFFICE O/P EST MOD 30 MIN: CPT

## 2023-02-23 RX ORDER — CHOLECALCIFEROL (VITAMIN D3) 10(400)/ML
DROPS ORAL
Qty: 120 | Refills: 3 | Status: ACTIVE | COMMUNITY
Start: 2023-02-23 | End: 1900-01-01

## 2023-02-23 RX ORDER — LANCING DEVICE
EACH MISCELLANEOUS
Qty: 1 | Refills: 1 | Status: ACTIVE | COMMUNITY
Start: 2023-02-23 | End: 1900-01-01

## 2023-04-19 ENCOUNTER — EMERGENCY (EMERGENCY)
Facility: HOSPITAL | Age: 38
LOS: 1 days | Discharge: HOME | End: 2023-04-19
Attending: EMERGENCY MEDICINE | Admitting: EMERGENCY MEDICINE
Payer: MEDICAID

## 2023-04-19 VITALS
TEMPERATURE: 97 F | HEART RATE: 93 BPM | RESPIRATION RATE: 21 BRPM | OXYGEN SATURATION: 99 % | DIASTOLIC BLOOD PRESSURE: 86 MMHG | HEIGHT: 62 IN | WEIGHT: 145.06 LBS | SYSTOLIC BLOOD PRESSURE: 123 MMHG

## 2023-04-19 PROCEDURE — 99285 EMERGENCY DEPT VISIT HI MDM: CPT

## 2023-04-19 NOTE — ED ADULT TRIAGE NOTE - CHIEF COMPLAINT QUOTE
pt  presents  with lower abd pain which has been increasing for 12 hours  she  states that  over the last hour  she has  had  some blood in her  urine and possibly  from  her  vagina

## 2023-04-20 VITALS
TEMPERATURE: 98 F | SYSTOLIC BLOOD PRESSURE: 131 MMHG | DIASTOLIC BLOOD PRESSURE: 82 MMHG | HEART RATE: 84 BPM | RESPIRATION RATE: 20 BRPM | OXYGEN SATURATION: 98 %

## 2023-04-20 LAB
ANION GAP SERPL CALC-SCNC: 9 MMOL/L — SIGNIFICANT CHANGE UP (ref 5–17)
APPEARANCE UR: CLEAR — SIGNIFICANT CHANGE UP
BASOPHILS # BLD AUTO: 0.04 K/UL — SIGNIFICANT CHANGE UP (ref 0–0.2)
BASOPHILS NFR BLD AUTO: 0.5 % — SIGNIFICANT CHANGE UP (ref 0–2)
BILIRUB UR-MCNC: NEGATIVE — SIGNIFICANT CHANGE UP
BUN SERPL-MCNC: 9 MG/DL — SIGNIFICANT CHANGE UP (ref 7–23)
C TRACH RRNA SPEC QL NAA+PROBE: SIGNIFICANT CHANGE UP
CALCIUM SERPL-MCNC: 9.2 MG/DL — SIGNIFICANT CHANGE UP (ref 8.4–10.5)
CHLORIDE SERPL-SCNC: 98 MMOL/L — SIGNIFICANT CHANGE UP (ref 96–108)
CO2 SERPL-SCNC: 28 MMOL/L — SIGNIFICANT CHANGE UP (ref 22–31)
COLOR SPEC: YELLOW — SIGNIFICANT CHANGE UP
CREAT SERPL-MCNC: 0.66 MG/DL — SIGNIFICANT CHANGE UP (ref 0.5–1.3)
DIFF PNL FLD: ABNORMAL
EGFR: 116 ML/MIN/1.73M2 — SIGNIFICANT CHANGE UP
EOSINOPHIL # BLD AUTO: 0.13 K/UL — SIGNIFICANT CHANGE UP (ref 0–0.5)
EOSINOPHIL NFR BLD AUTO: 1.5 % — SIGNIFICANT CHANGE UP (ref 0–6)
GLUCOSE SERPL-MCNC: 334 MG/DL — HIGH (ref 70–99)
GLUCOSE UR QL: 250
HCT VFR BLD CALC: 40.9 % — SIGNIFICANT CHANGE UP (ref 34.5–45)
HGB BLD-MCNC: 13.7 G/DL — SIGNIFICANT CHANGE UP (ref 11.5–15.5)
IMM GRANULOCYTES NFR BLD AUTO: 0.4 % — SIGNIFICANT CHANGE UP (ref 0–0.9)
KETONES UR-MCNC: NEGATIVE — SIGNIFICANT CHANGE UP
LEUKOCYTE ESTERASE UR-ACNC: NEGATIVE — SIGNIFICANT CHANGE UP
LYMPHOCYTES # BLD AUTO: 3.18 K/UL — SIGNIFICANT CHANGE UP (ref 1–3.3)
LYMPHOCYTES # BLD AUTO: 37.3 % — SIGNIFICANT CHANGE UP (ref 13–44)
MCHC RBC-ENTMCNC: 28.3 PG — SIGNIFICANT CHANGE UP (ref 27–34)
MCHC RBC-ENTMCNC: 33.5 GM/DL — SIGNIFICANT CHANGE UP (ref 32–36)
MCV RBC AUTO: 84.5 FL — SIGNIFICANT CHANGE UP (ref 80–100)
MONOCYTES # BLD AUTO: 0.63 K/UL — SIGNIFICANT CHANGE UP (ref 0–0.9)
MONOCYTES NFR BLD AUTO: 7.4 % — SIGNIFICANT CHANGE UP (ref 2–14)
N GONORRHOEA RRNA SPEC QL NAA+PROBE: SIGNIFICANT CHANGE UP
NEUTROPHILS # BLD AUTO: 4.51 K/UL — SIGNIFICANT CHANGE UP (ref 1.8–7.4)
NEUTROPHILS NFR BLD AUTO: 52.9 % — SIGNIFICANT CHANGE UP (ref 43–77)
NITRITE UR-MCNC: NEGATIVE — SIGNIFICANT CHANGE UP
NRBC # BLD: 0 /100 WBCS — SIGNIFICANT CHANGE UP (ref 0–0)
PH UR: 7 — SIGNIFICANT CHANGE UP (ref 5–8)
PLATELET # BLD AUTO: 263 K/UL — SIGNIFICANT CHANGE UP (ref 150–400)
POTASSIUM SERPL-MCNC: 4.6 MMOL/L — SIGNIFICANT CHANGE UP (ref 3.5–5.3)
POTASSIUM SERPL-SCNC: 4.6 MMOL/L — SIGNIFICANT CHANGE UP (ref 3.5–5.3)
PROT UR-MCNC: 15
RBC # BLD: 4.84 M/UL — SIGNIFICANT CHANGE UP (ref 3.8–5.2)
RBC # FLD: 12.5 % — SIGNIFICANT CHANGE UP (ref 10.3–14.5)
SODIUM SERPL-SCNC: 135 MMOL/L — SIGNIFICANT CHANGE UP (ref 135–145)
SP GR SPEC: 1.01 — SIGNIFICANT CHANGE UP (ref 1.01–1.02)
SPECIMEN SOURCE: SIGNIFICANT CHANGE UP
UROBILINOGEN FLD QL: NEGATIVE — SIGNIFICANT CHANGE UP
WBC # BLD: 8.52 K/UL — SIGNIFICANT CHANGE UP (ref 3.8–10.5)
WBC # FLD AUTO: 8.52 K/UL — SIGNIFICANT CHANGE UP (ref 3.8–10.5)

## 2023-04-20 PROCEDURE — 87491 CHLMYD TRACH DNA AMP PROBE: CPT

## 2023-04-20 PROCEDURE — 96374 THER/PROPH/DIAG INJ IV PUSH: CPT

## 2023-04-20 PROCEDURE — 87086 URINE CULTURE/COLONY COUNT: CPT

## 2023-04-20 PROCEDURE — 76830 TRANSVAGINAL US NON-OB: CPT

## 2023-04-20 PROCEDURE — 74177 CT ABD & PELVIS W/CONTRAST: CPT | Mod: 26,MA

## 2023-04-20 PROCEDURE — 36415 COLL VENOUS BLD VENIPUNCTURE: CPT

## 2023-04-20 PROCEDURE — 81025 URINE PREGNANCY TEST: CPT

## 2023-04-20 PROCEDURE — 96375 TX/PRO/DX INJ NEW DRUG ADDON: CPT

## 2023-04-20 PROCEDURE — 74177 CT ABD & PELVIS W/CONTRAST: CPT | Mod: MA

## 2023-04-20 PROCEDURE — 81001 URINALYSIS AUTO W/SCOPE: CPT

## 2023-04-20 PROCEDURE — 99284 EMERGENCY DEPT VISIT MOD MDM: CPT | Mod: 25

## 2023-04-20 PROCEDURE — 85025 COMPLETE CBC W/AUTO DIFF WBC: CPT

## 2023-04-20 PROCEDURE — 87591 N.GONORRHOEAE DNA AMP PROB: CPT

## 2023-04-20 PROCEDURE — 76830 TRANSVAGINAL US NON-OB: CPT | Mod: 26

## 2023-04-20 PROCEDURE — 80048 BASIC METABOLIC PNL TOTAL CA: CPT

## 2023-04-20 RX ORDER — IBUPROFEN 200 MG
1 TABLET ORAL
Qty: 30 | Refills: 0
Start: 2023-04-20

## 2023-04-20 RX ORDER — OXYCODONE AND ACETAMINOPHEN 5; 325 MG/1; MG/1
1 TABLET ORAL
Qty: 20 | Refills: 0
Start: 2023-04-20

## 2023-04-20 RX ORDER — ONDANSETRON 8 MG/1
4 TABLET, FILM COATED ORAL ONCE
Refills: 0 | Status: COMPLETED | OUTPATIENT
Start: 2023-04-20 | End: 2023-04-20

## 2023-04-20 RX ORDER — KETOROLAC TROMETHAMINE 30 MG/ML
30 SYRINGE (ML) INJECTION ONCE
Refills: 0 | Status: DISCONTINUED | OUTPATIENT
Start: 2023-04-20 | End: 2023-04-20

## 2023-04-20 RX ORDER — ONDANSETRON 8 MG/1
1 TABLET, FILM COATED ORAL
Qty: 20 | Refills: 0
Start: 2023-04-20

## 2023-04-20 RX ORDER — TAMSULOSIN HYDROCHLORIDE 0.4 MG/1
1 CAPSULE ORAL
Qty: 20 | Refills: 0
Start: 2023-04-20 | End: 2023-05-09

## 2023-04-20 RX ORDER — MORPHINE SULFATE 50 MG/1
4 CAPSULE, EXTENDED RELEASE ORAL ONCE
Refills: 0 | Status: DISCONTINUED | OUTPATIENT
Start: 2023-04-20 | End: 2023-04-20

## 2023-04-20 RX ADMIN — Medication 30 MILLIGRAM(S): at 00:37

## 2023-04-20 RX ADMIN — ONDANSETRON 4 MILLIGRAM(S): 8 TABLET, FILM COATED ORAL at 00:38

## 2023-04-20 RX ADMIN — MORPHINE SULFATE 4 MILLIGRAM(S): 50 CAPSULE, EXTENDED RELEASE ORAL at 00:38

## 2023-04-20 NOTE — ED ADULT NURSE NOTE - NS ED NURSE DC PT EDUCATION RESOURCES
[facility]AMA[facility]      Patient is a 62y old  Male who presents with a chief complaint of low back pain (14 Apr 2021 13:30)      HPI:  62M c/o low back pain x 1 year since work related injury. Patient notes low back pain has worsened since onset. Notes stiffness, pain radiating into his left side. Patient denies use of a cane/walker for assistance, however notes ability to walk more than short distances is limited 2/2 pain. Patient reports failure of conservative management including oral analgesics, activity modification. Denies h/o back surgeries. Denies hospitalization or use of abx in the last 3 months. Present for elective Lami/PSF L5-S1 with Dr. Garcia.  (17 Mar 2021 13:17)      Addl  Medical issues:       HEALTH ISSUES - PROBLEM Dx:  Lumbar radiculopathy  Lumbar radiculopathy    HTN (hypertension)  HTN (hypertension)    HLP (hyperkeratosis lenticularis perstans)  HLP (hyperkeratosis lenticularis perstans)              MEDICATIONS  (STANDING):  acetaminophen   Tablet .. 1000 milliGRAM(s) Oral every 8 hours  cyclobenzaprine 10 milliGRAM(s) Oral every 8 hours  influenza   Vaccine 0.5 milliLiter(s) IntraMuscular once  polyethylene glycol 3350 17 Gram(s) Oral at bedtime  senna 2 Tablet(s) Oral at bedtime    MEDICATIONS  (PRN):  aluminum hydroxide/magnesium hydroxide/simethicone Suspension 30 milliLiter(s) Oral every 12 hours PRN Indigestion  magnesium hydroxide Suspension 30 milliLiter(s) Oral every 12 hours PRN Constipation  metoclopramide Injectable 10 milliGRAM(s) IV Push once PRN Nausea and/or Vomiting  naloxone Injectable 0.1 milliGRAM(s) IV Push every 3 minutes PRN For ANY of the following changes in patient status:  A. RR LESS THAN 10 breaths per minute, B. Oxygen saturation LESS THAN 90%, C. Sedation score of 6  ondansetron Injectable 4 milliGRAM(s) IV Push every 6 hours PRN Nausea  oxyCODONE    IR 5 milliGRAM(s) Oral every 4 hours PRN Moderate Pain (4 - 6)  oxyCODONE    IR 10 milliGRAM(s) Oral every 4 hours PRN Severe Pain (7 - 10)          PAST MEDICAL & SURGICAL HISTORY:  HLP (hyperkeratosis lenticularis perstans)    HTN (hypertension)    Bilateral inguinal hernia    History of tonsillectomy    H/O right wrist surgery    History of eye surgery  bilat        REVIEW OF SYSTEMS:  [x] As per HPI          Reviewed   no change                            Changes noted  CONSTITUTIONAL: No fever, weight loss, or fatigue  RESPIRATORY: No cough, wheezing, chills or hemoptysis; No Shortness of Breath  CARDIOVASCULAR: No chest pain, palpitations, dizziness, or leg swelling  GASTROINTESTINAL: No abdominal or epigastric pain. No nausea, vomiting, or hematemesis; No diarrhea or constipation. No melena or hematochezia.  MUSCULOSKELETAL: No joint pain or swelling;  Neuro:   Grossly  Negative  Psych        Awake  alert  [x] All others negative	  [ ] Unable to obtain      Vital Signs Last 24 Hrs  T(C): 36.7 (14 Apr 2021 20:12), Max: 36.9 (14 Apr 2021 08:31)  T(F): 98.1 (14 Apr 2021 20:12), Max: 98.4 (14 Apr 2021 08:31)  HR: 95 (14 Apr 2021 20:12) (89 - 99)  BP: 112/70 (14 Apr 2021 20:12) (104/63 - 119/55)  BP(mean): --  RR: 18 (14 Apr 2021 20:12) (16 - 18)  SpO2: 95% (14 Apr 2021 20:12) (95% - 97%)    PHYSICAL EXAM:      Constitutional  PHYSICAL EXAM:      Constitutional: NAD, well-groomed, well-developed  HEENT: PERRLA, EOMI, Normal Hearing, MMM  Neck: No LAD, No JVD  Back: Normal spine flexure, No CVA tenderness  Respiratory: CTABCardiovascular: S1 and S2, RRR, no M/G/R  Gastrointestinal: BS+, soft, NT/ND  Extremities: No peripheral edema  Vascular: 2+ peripheral pulses  Neurological: A/O x 3, no focal deficits  Psychiatric: Normal mood, normal affect  Musculoskeletal: 5/5 strength b/l upper and lower extremities  Skin: No rashes                                  13.7   10.18 )-----------( 301      ( 14 Apr 2021 08:33 )             41.1     04-14    136  |  103  |  14  ----------------------------<  146<H>  4.0   |  24  |  0.75    Ca    8.9      14 Apr 2021 08:33            CAPILLARY BLOOD GLUCOSE          I&O's Summary    13 Apr 2021 07:01  -  14 Apr 2021 07:00  --------------------------------------------------------  IN: 420 mL / OUT: 1350 mL / NET: -930 mL    14 Apr 2021 07:01  -  14 Apr 2021 21:57  --------------------------------------------------------  IN: 480 mL / OUT: 940 mL / NET: -460     Yes

## 2023-04-20 NOTE — ED PROVIDER NOTE - CLINICAL SUMMARY MEDICAL DECISION MAKING FREE TEXT BOX
37-year-old female with history of diabetes,  complaining of lower abdominal pain sharp severe since the morning today associated with dysuria and increased urinary frequency.  No nausea vomiting diarrhea.  No fever or chills.  Denies pregnancy.  LMP .    Patient crying in pain.  Mild to moderate suprapubic and left lower quadrant tenderness to palpation.  Mild CMT.  No significant vaginal discharge.  Partial DDx: PID, TOA, ovarian cyst, ovarian torsion, colitis, cystitis, UTI.  Check labs UA pelvic sonogram, chlamydia gonorrhea.  IV analgesics.  Reassess 37-year-old female with history of diabetes,  complaining of lower abdominal pain sharp severe since the morning today associated with dysuria and increased urinary frequency.  No nausea vomiting diarrhea.  No fever or chills.  Denies pregnancy.  LMP .    Patient crying in pain.  Mild to moderate suprapubic and left lower quadrant tenderness to palpation.  Mild CMT.  No significant vaginal discharge.  Partial DDx: PID, TOA, ovarian cyst, ovarian torsion, colitis, cystitis, UTI.  Check labs UA pelvic sonogram, chlamydia gonorrhea.  IV analgesics.  Reassess    Patient sonogram unremarkable.  CT shows 2 small left distal ureteral stone.  Labs unremarkable.  Patient much better now no pain.  Informed patient of results.  Told to follow-up with urology.  Analgesics prescribed.

## 2023-04-20 NOTE — ED PROVIDER NOTE - CARE PROVIDER_API CALL
Iban Sutton)  Urology  [address], [address]  [place], [place] [id number]  Phone: [phone]  Fax: [phone]  Follow Up Time: 1-3 Days

## 2023-04-20 NOTE — ED PROVIDER NOTE - NSFOLLOWUPINSTRUCTIONS_ED_ALL_ED_FT
-take motrin /ibuprofen as needed for pain  -take percocet in addition for stronger pain  -take flomax daily  -take zofran (ondansetron) as needed for nausea    - see urologist for follow up this week  - return for uncontrolled pain, severe vomiting, fever, or other concerns      -tome motrin/ibuprofeno según sea necesario para el dolor  -tome percocet además para un dolor más jackie  -shantanu flomax diariamente  -tome zofran (ondansetron) según sea necesario para las náuseas    - consulte al urólogo para el seguimiento esta semana  - regresar por dolor incontrolable, vómitos intensos, fiebre u otras preocupaciones        Cálculos renales    LO QUE NECESITA SABER:    Los cálculos renales se vasquez en el sistema urinario cuando el agua y los residuos de la orina no están ruby balanceados. Cuando esto sucede, ciertos tipos de nguyen de desecho se separan de la orina. Los nguyen se acumulan y vasquez piedras en los riñones. Podría tener más de un cálculo.  Cálculos renales    INSTRUCCIONES SOBRE EL [facility] HOSPITALARIA:    Regrese a la elliot de emergencias si:    Tiene vómitos y no se alivian con medicamentos.    Llame a james médico o especialista en riñón si:    Tiene fiebre.    Tiene dificultad para orinar.    Usted orina con will.    Usted tiene dolor intenso.    Tiene alguna pregunta o inquietud acerca de james condición o cuidado.  Medicamentos:Es posible que usted necesite alguno de los siguientes:    AINEcomo el ibuprofeno, ayudan a disminuir la inflamación, el dolor y la fiebre. Ita medicamento está disponible con o sin radha receta médica. Los KONSTANTIN pueden causar sangrado estomacal o problemas renales en ciertas personas. Si usted lisseth un medicamento anticoagulante, siempre pregúntele a james médico si los KONSTANTIN son seguros para usted. Siempre jose r la etiqueta de ita medicamento y siga las instrucciones.    Acetaminofénalivia el dolor y baja la fiebre. Está disponible sin receta médica. Pregunte la cantidad y la frecuencia con que debe tomarlos. Siga las indicaciones. Jose R las etiquetas de todos los demás medicamentos que esté usando para saber si también contienen acetaminofén, o pregunte a james médico o farmacéutico. El acetaminofén puede causar daño en el hígado cuando no se lisseth de forma correcta.    Puede administrarsepodrían administrarse. Pregunte al médico cómo debe shantanu ita medicamento de forma ren. Algunos medicamentos recetados para el dolor contienen acetaminofén. No tome otros medicamentos que contengan acetaminofén sin consultarlo con james médico. Demasiado acetaminofeno puede causar daño al hígado. Los medicamentos recetados para el dolor podrían causar estreñimiento. Pregunte a james médico lisseth prevenir o tratar estreñimiento.    Los medicamentospara balancear el nivel de los electrolitos.    [place] michelle medicamentos lisseth se le haya indicado.Consulte con james médico si usted juan que james medicamento no le está ayudando o si presenta efectos secundarios. Infórmele al médico si usted es alérgico a algún medicamento. Mantenga radha lista actualizada de los medicamentos, las vitaminas y los productos herbales que lisseth. Incluya los siguientes datos de los medicamentos: cantidad, frecuencia y motivo de administración. Traiga con usted la lista o los envases de las píldoras a michelle citas de seguimiento. Lleve la lista de los medicamentos con usted en valdemar de radha emergencia.  Lo que puede hacer para controlar los cálculos:    Ingiera más líquidos.Es probable que james médico le indique que tome al menos 8 a 12 vasos (de ocho onzas) de líquidos al día. Ovando ayuda a deshacerse de los cálculos renales cuando usted orina. El agua es el mejor líquido para shantanu.    Cuele la orina cada vez que use el baño.Orine por medio de un colador o un pedazo de beck galaviz para así recolectar los cálculos. Lleve los cálculos donde james médico para que pueda enviarlos al laboratorio y realizarles exámenes. Ovando ayudará a james médico a planear el mejor tratamiento para usted.  Buscar piedras en el filtro      Pregunte si debe evitar algún alimento.Es posible que necesite limitar el oxalato. El oxalato es radha sustancia química que se encuentra en algunos alimentos de origen vegetal. El tipo de cálculo renal más común se compone de nguyen que contienen calcio y oxalato. James médico o dietista podría recomendar que limite el consumo de oxalato si sufre de ita tipo de cálculo renal frecuentemente. Es probable que usted tenga que limitar la cantidad de sodio (sal) o proteínas que usted come. Pida más información sobre los mejores alimentos para usted.  Alimentos con alto contenido de oxalatos      Manténgase físicamente activo según las instrucciones.Michelle cálculos pueden pasar con más facilidad si usted permanece activo. La actividad física también puede ayudarle a controlar el peso. Pregunte sobre cuáles son las mejores actividades para usted.  Mir afrodescendiente caminando lisseth ejercicio  Después de eliminar los cálculos renales:James médico puede ordenar un examen de orina de 24 horas. Los resultados del examen de orina de 24 horas ayudarán a james médico a planear las formas de evitar la formación de más cálculos. James médico le proporcionará más instrucciones.    Acuda a las consultas de control con james médico o especialista en riñón según le indicaron:[place] michelle preguntas para que se acuerde de hacerlas [place] michelle visitas.    Renal Colic    WHAT YOU NEED TO KNOW:    Renal colic is severe pain in your lower back or sides. The pain is usually on one side, but may be on both sides of your lower back. Renal colic may start quickly, come and go, and become worse over time. Renal colic is caused by a blockage in your urinary tract. The most common cause of a blockage is a kidney stone. Blood clots, ureter spasms, and dead tissue may also block your urinary tract.    DISCHARGE INSTRUCTIONS:    Return to the emergency department if:     You cannot stop vomiting.      You see new or increased bleeding when you urinate.      You are urinating less than usual, or not at all.      Your pain is not getting better even after treatment.    Contact your healthcare provider if:     You have fever.      You need to urinate more often than usual, or right away.      You see a stone in your urine strainer after you urinate.      You have questions or concerns about your condition or care.    Medicines:     Medicines may help decrease pain and muscle spasms. You may also need medicine to calm your stomach and stop vomiting.      Take your medicine as directed. Contact your healthcare provider if you think your medicine is not helping or if you have side effects. Tell him of her if you are allergic to any medicine. Keep a list of the medicines, vitamins, and herbs you take. Include the amounts, and when and why you take them. Bring the list or the pill bottles to follow-up visits. Carry your medicine list with you in case of an emergency.    Manage your symptoms:     Drink liquids as directed to help decrease pain and flush blockages from your urinary tract. Ask how much liquid to drink each day and which liquids are best for you. You may need to drink about 3 liters (12 glasses) of liquids each day. Half of your total daily liquids should be water. Limit coffee, tea, and soda to 2 cups daily. Your urine should be pale and clear.      Strain your urine every time you urinate. Urinate into a strainer (funnel with a fine mesh on the bottom) or glass jar to collect kidney stones. Give the kidney stones to your healthcare provider at your next visit.Look for Stones in the Filter           Eat a variety of healthy foods. Healthy foods include fruits, vegetables, whole-grain breads, low-fat dairy products, beans, lean meats, and fish. You may need to increase the amount of citrus fruit you eat, such as oranges. Ask your healthcare provider how much salt, calcium, and protein you should eat.      Avoid activity in hot temperatures. Heat may cause you to become dehydrated and urinate less.    Follow up with your healthcare provider as directed: You may need to return for tests to check if your blockage has cleared. Write down your questions so you remember to ask them during your visits

## 2023-04-20 NOTE — ED PROVIDER NOTE - PATIENT PORTAL LINK FT
You can access the FollowMyHealth Patient Portal offered by [facility] by registering at the following website: [email or web address]. By joining [email or web address]’s FollowMyHealth portal, you will also be able to view your health information using other applications (apps) compatible with our system.

## 2023-04-20 NOTE — ED ADULT NURSE REASSESSMENT NOTE - NS ED NURSE REASSESS COMMENT FT1
pt in NAD at this time, pt reported relief in pain and resting comfortably. awaiting results and disposition.

## 2023-04-20 NOTE — ED PROVIDER NOTE - OBJECTIVE STATEMENT
37-year-old female with history of diabetes,  complaining of lower abdominal pain sharp severe since the morning today associated with dysuria and increased urinary frequency.  No nausea vomiting diarrhea.  No fever or chills.  Denies pregnancy.  LMP .

## 2023-04-20 NOTE — ED PROVIDER NOTE - PHYSICAL EXAMINATION
exam:   General: crying in pain  HEENT: eyes perrl, nose normal,   cor: RRR, s1s2, 2+rad pulses.   lungs: ctabl, no resp distress.   abd: soft, nondistended. mild suprapubic and LLQ ttp. no rebound/guarding  : external genitalia normal. no vag dc. mild cmt. minimal cervical erythema. no adnex mass  neuro: a&ox3, cn2-12 intact, BYRD, 5/5 strength c nl sensation all extremities, nl coordination.   MSK: no extremity swelling.  Skin: normal, no rash

## 2023-04-21 LAB
CULTURE RESULTS: SIGNIFICANT CHANGE UP
SPECIMEN SOURCE: SIGNIFICANT CHANGE UP

## 2023-04-25 ENCOUNTER — APPOINTMENT (OUTPATIENT)
Dept: UROLOGY | Facility: CLINIC | Age: 38
End: 2023-04-25
Payer: MEDICAID

## 2023-04-25 VITALS
TEMPERATURE: 98.6 F | BODY MASS INDEX: 31.15 KG/M2 | HEART RATE: 98 BPM | OXYGEN SATURATION: 97 % | HEIGHT: 61 IN | SYSTOLIC BLOOD PRESSURE: 118 MMHG | DIASTOLIC BLOOD PRESSURE: 74 MMHG | WEIGHT: 165 LBS

## 2023-04-25 DIAGNOSIS — N20.1 CALCULUS OF URETER: ICD-10-CM

## 2023-04-25 PROCEDURE — 99204 OFFICE O/P NEW MOD 45 MIN: CPT

## 2023-04-25 RX ORDER — INSULIN HUMAN 100 [IU]/ML
100 INJECTION, SUSPENSION SUBCUTANEOUS AT BEDTIME
Qty: 1 | Refills: 0 | Status: DISCONTINUED | COMMUNITY
Start: 2021-08-13 | End: 2023-04-25

## 2023-04-25 RX ORDER — GLIMEPIRIDE 2 MG/1
2 TABLET ORAL DAILY
Qty: 90 | Refills: 3 | Status: DISCONTINUED | COMMUNITY
Start: 2022-04-29 | End: 2023-04-25

## 2023-04-25 RX ORDER — DOCUSATE SODIUM 100 MG/1
100 CAPSULE ORAL
Qty: 60 | Refills: 1 | Status: DISCONTINUED | COMMUNITY
Start: 2021-09-13 | End: 2023-04-25

## 2023-04-25 RX ORDER — GLIMEPIRIDE 1 MG/1
1 TABLET ORAL DAILY
Qty: 30 | Refills: 0 | Status: DISCONTINUED | COMMUNITY
Start: 2021-03-19 | End: 2023-04-25

## 2023-04-25 RX ORDER — ATORVASTATIN CALCIUM 40 MG/1
40 TABLET, FILM COATED ORAL
Qty: 90 | Refills: 3 | Status: DISCONTINUED | COMMUNITY
Start: 2023-02-23 | End: 2023-04-25

## 2023-04-25 RX ORDER — METFORMIN HYDROCHLORIDE 1000 MG/1
1000 TABLET, COATED ORAL TWICE DAILY
Qty: 180 | Refills: 2 | Status: DISCONTINUED | COMMUNITY
Start: 2018-08-10 | End: 2023-04-25

## 2023-04-25 RX ORDER — SYRINGE AND NEEDLE,INSULIN,1ML 29 G X1/2"
31G X 5/16" SYRINGE, EMPTY DISPOSABLE MISCELLANEOUS
Qty: 1 | Refills: 0 | Status: DISCONTINUED | COMMUNITY
Start: 2018-08-08 | End: 2023-04-25

## 2023-04-25 RX ORDER — INSULIN HUMAN 100 [IU]/ML
100 INJECTION, SUSPENSION SUBCUTANEOUS AT BEDTIME
Qty: 1 | Refills: 1 | Status: DISCONTINUED | COMMUNITY
Start: 2021-04-02 | End: 2023-04-25

## 2023-04-25 NOTE — ASSESSMENT
[FreeTextEntry1] : Ms. JIMÉNEZ is a 37 year  Other race  F who comes today to clinic referred from ED after presenting with left ureteral stones. Flank pain left, severe, 5 days. CT scan showing stones x2 of 5mm each at left UVJ. \par \par Discussed risks and alternatives associated with Ureteroscopy including but not limited to bleeding, infection- urinary tract infection, urinary sepsis, failure of the procedure, need for ureteral stent, pain/discomfort associated with it and possible future cystoscopy to remove the ureteral stents, potential injury to other surrounding structures- perforation of ureter or renal pelvis, ureteral stricture formation, long term stent placement or reconstructive surgery to repair the injury, urinoma, percutaneous drainage of urinoma, urinary fistula , injury to kidney that may require removal of the kidney, injury to urethra and bladder, failure to remove all stone fragments and need for additional procedures to finish the surgery to include ESWL, repeat ureteroscopy, percutaneous or open surgery. \par Discussed that Anesthesiologist will discuss the risks and complications associated with Anesthesia in more depth separately.\par \par \par Once all of the patient's questions were answered patient opted to proceed with URS + LL + JJ\par  [Ureteral Stone (592.1\N20.1)] : position

## 2023-04-25 NOTE — HISTORY OF PRESENT ILLNESS
[FreeTextEntry1] : Ms. JIMÉNEZ is a 37 year  Other race  F who comes today to clinic referred from Ed after presenting with left ureteral stones. Flank pain left, severe, 5 days. CT scan showing stones x2 of 5mm each at left UVJ. \par Denies fever chills hematuria

## 2023-04-26 LAB
ALBUMIN SERPL ELPH-MCNC: 4.8 G/DL
ALP BLD-CCNC: 124 U/L
ALT SERPL-CCNC: 19 U/L
ANION GAP SERPL CALC-SCNC: 12 MMOL/L
APPEARANCE: CLEAR
AST SERPL-CCNC: 23 U/L
BACTERIA: ABNORMAL /HPF
BASOPHILS # BLD AUTO: 0.02 K/UL
BASOPHILS NFR BLD AUTO: 0.3 %
BILIRUB SERPL-MCNC: 0.4 MG/DL
BILIRUBIN URINE: NEGATIVE
BLOOD URINE: NEGATIVE
BUN SERPL-MCNC: 6 MG/DL
CALCIUM SERPL-MCNC: 9.5 MG/DL
CAST: 0 /LPF
CHLORIDE SERPL-SCNC: 100 MMOL/L
CO2 SERPL-SCNC: 25 MMOL/L
COLOR: YELLOW
CREAT SERPL-MCNC: 0.55 MG/DL
EGFR: 121 ML/MIN/1.73M2
EOSINOPHIL # BLD AUTO: 0.09 K/UL
EOSINOPHIL NFR BLD AUTO: 1.2 %
EPITHELIAL CELLS: 14 /HPF
GLUCOSE QUALITATIVE U: NEGATIVE MG/DL
GLUCOSE SERPL-MCNC: 63 MG/DL
HCT VFR BLD CALC: 41.8 %
HGB BLD-MCNC: 13.5 G/DL
IMM GRANULOCYTES NFR BLD AUTO: 0.3 %
KETONES URINE: NEGATIVE MG/DL
LEUKOCYTE ESTERASE URINE: NEGATIVE
LYMPHOCYTES # BLD AUTO: 2.23 K/UL
LYMPHOCYTES NFR BLD AUTO: 29.9 %
MAN DIFF?: NORMAL
MCHC RBC-ENTMCNC: 27.8 PG
MCHC RBC-ENTMCNC: 32.3 GM/DL
MCV RBC AUTO: 86 FL
MICROSCOPIC-UA: NORMAL
MONOCYTES # BLD AUTO: 0.54 K/UL
MONOCYTES NFR BLD AUTO: 7.2 %
NEUTROPHILS # BLD AUTO: 4.55 K/UL
NEUTROPHILS NFR BLD AUTO: 61.1 %
NITRITE URINE: NEGATIVE
PH URINE: 6.5
PLATELET # BLD AUTO: 272 K/UL
POTASSIUM SERPL-SCNC: 4.7 MMOL/L
PROT SERPL-MCNC: 7.1 G/DL
PROTEIN URINE: NEGATIVE MG/DL
RBC # BLD: 4.86 M/UL
RBC # FLD: 12.7 %
RED BLOOD CELLS URINE: 1 /HPF
SODIUM SERPL-SCNC: 137 MMOL/L
SPECIFIC GRAVITY URINE: 1.02
UROBILINOGEN URINE: 1 MG/DL
WBC # FLD AUTO: 7.45 K/UL
WHITE BLOOD CELLS URINE: 2 /HPF

## 2023-04-27 LAB — BACTERIA UR CULT: NORMAL

## 2023-04-28 LAB
APTT BLD: 30 SEC
INR PPP: 1.03 RATIO
PT BLD: 11.9 SEC

## 2023-05-04 ENCOUNTER — NON-APPOINTMENT (OUTPATIENT)
Age: 38
End: 2023-05-04

## 2023-05-25 ENCOUNTER — APPOINTMENT (OUTPATIENT)
Dept: ENDOCRINOLOGY | Facility: CLINIC | Age: 38
End: 2023-05-25

## 2023-06-07 NOTE — ED ADULT NURSE NOTE - SUICIDE SCREENING QUESTION 3
Quality 111:Pneumonia Vaccination Status For Older Adults: Patient received any pneumococcal conjugate or polysaccharide vaccine on or after their 60th birthday and before the end of the measurement period Quality 431: Preventive Care And Screening: Unhealthy Alcohol Use - Screening: Patient not identified as an unhealthy alcohol user when screened for unhealthy alcohol use using a systematic screening method Quality 226: Preventive Care And Screening: Tobacco Use: Screening And Cessation Intervention: Patient screened for tobacco use and is an ex/non-smoker Quality 110: Preventive Care And Screening: Influenza Immunization: Influenza Immunization Administered during Influenza season Detail Level: Detailed No

## 2023-09-14 ENCOUNTER — EMERGENCY (EMERGENCY)
Facility: HOSPITAL | Age: 38
LOS: 1 days | Discharge: HOME | End: 2023-09-14
Attending: EMERGENCY MEDICINE | Admitting: EMERGENCY MEDICINE
Payer: MEDICAID

## 2023-09-14 VITALS
WEIGHT: 164.91 LBS | OXYGEN SATURATION: 99 % | TEMPERATURE: 97 F | DIASTOLIC BLOOD PRESSURE: 108 MMHG | SYSTOLIC BLOOD PRESSURE: 144 MMHG | RESPIRATION RATE: 23 BRPM | HEART RATE: 94 BPM | HEIGHT: 62 IN

## 2023-09-14 VITALS
OXYGEN SATURATION: 98 % | TEMPERATURE: 98 F | RESPIRATION RATE: 14 BRPM | HEART RATE: 76 BPM | DIASTOLIC BLOOD PRESSURE: 89 MMHG | SYSTOLIC BLOOD PRESSURE: 130 MMHG

## 2023-09-14 LAB
ALBUMIN SERPL ELPH-MCNC: 3.9 G/DL — SIGNIFICANT CHANGE UP (ref 3.3–5)
ALP SERPL-CCNC: 129 U/L — HIGH (ref 40–120)
ALT FLD-CCNC: 20 U/L — SIGNIFICANT CHANGE UP (ref 10–45)
ANION GAP SERPL CALC-SCNC: 8 MMOL/L — SIGNIFICANT CHANGE UP (ref 5–17)
AST SERPL-CCNC: 18 U/L — SIGNIFICANT CHANGE UP (ref 10–40)
BASOPHILS # BLD AUTO: 0.03 K/UL — SIGNIFICANT CHANGE UP (ref 0–0.2)
BASOPHILS NFR BLD AUTO: 0.3 % — SIGNIFICANT CHANGE UP (ref 0–2)
BILIRUB SERPL-MCNC: 0.3 MG/DL — SIGNIFICANT CHANGE UP (ref 0.2–1.2)
BUN SERPL-MCNC: 9 MG/DL — SIGNIFICANT CHANGE UP (ref 7–23)
CALCIUM SERPL-MCNC: 9.1 MG/DL — SIGNIFICANT CHANGE UP (ref 8.4–10.5)
CHLORIDE SERPL-SCNC: 96 MMOL/L — SIGNIFICANT CHANGE UP (ref 96–108)
CO2 SERPL-SCNC: 28 MMOL/L — SIGNIFICANT CHANGE UP (ref 22–31)
CREAT SERPL-MCNC: 0.85 MG/DL — SIGNIFICANT CHANGE UP (ref 0.5–1.3)
EGFR: 90 ML/MIN/1.73M2 — SIGNIFICANT CHANGE UP
EOSINOPHIL # BLD AUTO: 0.15 K/UL — SIGNIFICANT CHANGE UP (ref 0–0.5)
EOSINOPHIL NFR BLD AUTO: 1.7 % — SIGNIFICANT CHANGE UP (ref 0–6)
GLUCOSE BLDC GLUCOMTR-MCNC: 226 MG/DL — HIGH (ref 70–99)
GLUCOSE BLDC GLUCOMTR-MCNC: 346 MG/DL — HIGH (ref 70–99)
GLUCOSE SERPL-MCNC: 408 MG/DL — HIGH (ref 70–99)
HCT VFR BLD CALC: 39.4 % — SIGNIFICANT CHANGE UP (ref 34.5–45)
HGB BLD-MCNC: 13.2 G/DL — SIGNIFICANT CHANGE UP (ref 11.5–15.5)
IMM GRANULOCYTES NFR BLD AUTO: 0.2 % — SIGNIFICANT CHANGE UP (ref 0–0.9)
LYMPHOCYTES # BLD AUTO: 3.36 K/UL — HIGH (ref 1–3.3)
LYMPHOCYTES # BLD AUTO: 37.5 % — SIGNIFICANT CHANGE UP (ref 13–44)
MCHC RBC-ENTMCNC: 27.9 PG — SIGNIFICANT CHANGE UP (ref 27–34)
MCHC RBC-ENTMCNC: 33.5 GM/DL — SIGNIFICANT CHANGE UP (ref 32–36)
MCV RBC AUTO: 83.3 FL — SIGNIFICANT CHANGE UP (ref 80–100)
MONOCYTES # BLD AUTO: 0.7 K/UL — SIGNIFICANT CHANGE UP (ref 0–0.9)
MONOCYTES NFR BLD AUTO: 7.8 % — SIGNIFICANT CHANGE UP (ref 2–14)
NEUTROPHILS # BLD AUTO: 4.69 K/UL — SIGNIFICANT CHANGE UP (ref 1.8–7.4)
NEUTROPHILS NFR BLD AUTO: 52.5 % — SIGNIFICANT CHANGE UP (ref 43–77)
NRBC # BLD: 0 /100 WBCS — SIGNIFICANT CHANGE UP (ref 0–0)
PLATELET # BLD AUTO: 266 K/UL — SIGNIFICANT CHANGE UP (ref 150–400)
POTASSIUM SERPL-MCNC: 4.3 MMOL/L — SIGNIFICANT CHANGE UP (ref 3.5–5.3)
POTASSIUM SERPL-SCNC: 4.3 MMOL/L — SIGNIFICANT CHANGE UP (ref 3.5–5.3)
PROT SERPL-MCNC: 7.3 G/DL — SIGNIFICANT CHANGE UP (ref 6–8.3)
RBC # BLD: 4.73 M/UL — SIGNIFICANT CHANGE UP (ref 3.8–5.2)
RBC # FLD: 12.4 % — SIGNIFICANT CHANGE UP (ref 10.3–14.5)
SODIUM SERPL-SCNC: 132 MMOL/L — LOW (ref 135–145)
TROPONIN I, HIGH SENSITIVITY RESULT: <4 NG/L — SIGNIFICANT CHANGE UP
WBC # BLD: 8.95 K/UL — SIGNIFICANT CHANGE UP (ref 3.8–10.5)
WBC # FLD AUTO: 8.95 K/UL — SIGNIFICANT CHANGE UP (ref 3.8–10.5)

## 2023-09-14 PROCEDURE — 84484 ASSAY OF TROPONIN QUANT: CPT

## 2023-09-14 PROCEDURE — 71045 X-RAY EXAM CHEST 1 VIEW: CPT | Mod: 26

## 2023-09-14 PROCEDURE — 99285 EMERGENCY DEPT VISIT HI MDM: CPT

## 2023-09-14 PROCEDURE — 71045 X-RAY EXAM CHEST 1 VIEW: CPT

## 2023-09-14 PROCEDURE — 96361 HYDRATE IV INFUSION ADD-ON: CPT

## 2023-09-14 PROCEDURE — 85025 COMPLETE CBC W/AUTO DIFF WBC: CPT

## 2023-09-14 PROCEDURE — 96374 THER/PROPH/DIAG INJ IV PUSH: CPT

## 2023-09-14 PROCEDURE — 93010 ELECTROCARDIOGRAM REPORT: CPT

## 2023-09-14 PROCEDURE — 96375 TX/PRO/DX INJ NEW DRUG ADDON: CPT

## 2023-09-14 PROCEDURE — 80053 COMPREHEN METABOLIC PANEL: CPT

## 2023-09-14 PROCEDURE — 93005 ELECTROCARDIOGRAM TRACING: CPT

## 2023-09-14 PROCEDURE — 99285 EMERGENCY DEPT VISIT HI MDM: CPT | Mod: 25

## 2023-09-14 PROCEDURE — 36415 COLL VENOUS BLD VENIPUNCTURE: CPT

## 2023-09-14 PROCEDURE — 82962 GLUCOSE BLOOD TEST: CPT

## 2023-09-14 RX ORDER — INSULIN HUMAN 100 [IU]/ML
10 INJECTION, SOLUTION SUBCUTANEOUS ONCE
Refills: 0 | Status: DISCONTINUED | OUTPATIENT
Start: 2023-09-14 | End: 2023-09-14

## 2023-09-14 RX ORDER — SODIUM CHLORIDE 9 MG/ML
1000 INJECTION INTRAMUSCULAR; INTRAVENOUS; SUBCUTANEOUS ONCE
Refills: 0 | Status: COMPLETED | OUTPATIENT
Start: 2023-09-14 | End: 2023-09-14

## 2023-09-14 RX ORDER — INSULIN HUMAN 100 [IU]/ML
8 INJECTION, SOLUTION SUBCUTANEOUS ONCE
Refills: 0 | Status: COMPLETED | OUTPATIENT
Start: 2023-09-14 | End: 2023-09-14

## 2023-09-14 RX ORDER — KETOROLAC TROMETHAMINE 30 MG/ML
30 SYRINGE (ML) INJECTION ONCE
Refills: 0 | Status: DISCONTINUED | OUTPATIENT
Start: 2023-09-14 | End: 2023-09-14

## 2023-09-14 RX ADMIN — INSULIN HUMAN 8 UNIT(S): 100 INJECTION, SOLUTION SUBCUTANEOUS at 02:05

## 2023-09-14 RX ADMIN — Medication 30 MILLIGRAM(S): at 01:06

## 2023-09-14 RX ADMIN — SODIUM CHLORIDE 1000 MILLILITER(S): 9 INJECTION INTRAMUSCULAR; INTRAVENOUS; SUBCUTANEOUS at 02:05

## 2023-09-14 RX ADMIN — SODIUM CHLORIDE 1000 MILLILITER(S): 9 INJECTION INTRAMUSCULAR; INTRAVENOUS; SUBCUTANEOUS at 02:53

## 2023-09-14 NOTE — ED PROVIDER NOTE - CROS ED ROS STATEMENT
[FreeTextEntry1] : Patient seen and examined\par Patient nodding off while walked in\par Open eyes to touch, able to say his first name, confused\par Some word perseveration noted\par Able to name some objects 1/3 objects.\par VFF to confrontation but can not participate in direct field exam.\par Right UE drift.\par Gait not assessed at this time
all other ROS negative except as per HPI

## 2023-09-14 NOTE — ED PROVIDER NOTE - OBJECTIVE STATEMENT
37 y/o F with h/o DM , presents to ED c/o sudden onset chest pain for past 3 hours, pain sharp and severe, no SOB. no cough or fever.

## 2023-09-14 NOTE — ED ADULT TRIAGE NOTE - CHIEF COMPLAINT QUOTE
generalized chest pain radiating to left arm, back and neck x 3 days. pt reports SOB over last 3 hours with an increase in pain.

## 2023-09-14 NOTE — ED ADULT NURSE NOTE - NSFALLRISKFACTORS_ED_ALL_ED
HANK AUSTIN    Patient Age: 75 year old    [unfilled]  MESSAGE:   Received call from patient stating he needed to cancel his appointment with Dr. Meza on 10/14 due to the [facility] doctor ordering lab tests to be done that day. Patient states he is also going to be receiving bandages from this doctor and he wants to make sure they are going to be covered by his insurance. Patient is requesting a call back. Message routed to team for assistance.      Next and Last Visit with Provider and Department  Last visit with Tin Meza DPM: 10/7/2020  Last visit with this department: 9/29/2020    Next appt with Tin Meza DPM: Visit date not found  Next appt with this department: Visit date not found    WEIGHT AND HEIGHT:        ALLERGIES:   Allergen Reactions   • Zoster Vac Recomb Adjuvanted ANAPHYLAXIS     Chills, shaking    • Amoxicillin-Pot Clavulanate DIARRHEA and NAUSEA   • Nsaids NAUSEA     Stomach cramps   • Azithromycin Other (See Comments)   • Ciprofloxacin Other (See Comments)     Bad headache   • Ibuprofen NAUSEA     Stomach cramps     Current Outpatient Medications   Medication Sig   • lisinopril (ZESTRIL) 20 MG tablet TAKE ONE TABLET BY MOUTH TWICE DAILY    • pantoprazole (PROTONIX) 40 MG tablet Take 1 tablet by mouth 2 times daily.   • montelukast (SINGULAIR) 10 MG tablet Take 1 tablet by mouth every evening.   • NovoLOG 100 UNIT/ML injectable solution Use up to 100 units daily via insulin pump   • metoPROLOL tartrate (LOPRESSOR) 25 MG tablet TAKE ONE TABLET BY MOUTH TWICE DAILY   • cephalexin (Keflex) 500 MG capsule Take 1 capsule by mouth 2 times daily.   • triamcinolone (ARISTOCORT) 0.1 % cream Apply to affected areas twice daily for max of 2 weeks   • levothyroxine 150 MCG tablet Take 1 tablet by mouth daily.   • morphine SR (MS CONTIN) 15 MG 12 hr tablet Take 1 tablet by mouth 3 times daily. Do not start before September 20, 2020.   • morphine SR (MS CONTIN) 15 MG 12  hr tablet Take 1 tablet by mouth 3 times daily. Do not start before August 21, 2020.   • naLOXone (NARCAN) 4 MG/0.1ML nasal spray Call 911. [place] the content of 1 device into 1 nostril. May repeat with 2nd device in alternate nostril if no response in 2-3 minutes.   • pravastatin (PRAVACHOL) 20 MG tablet TAKE ONE TABLET BY MOUTH ONE TIME DAILY   • gabapentin (NEURONTIN) 800 MG tablet Take 1 tablet by mouth 4 times daily.   • Zinc 50 MG Tab    • magnesium 250 MG tablet    • ciclopirox olamine (LOPROX) 0.77 % topical suspension APPLY TO AFFECTED AREA ONCE DAILY   • glucagon (GLUCAGON EMERGENCY) 1 MG injection kit Inject 1 mg into the muscle 1 time for 1 dose.   • insulin glargine (LANTUS) 100 UNIT/ML vial solution Take 30 units every 24 hours in case of pump malfunction   • SPIRIVA HANDIHALER 18 MCG capsule for inhaler inhale 1 puff by inhalation route daily   • fluticasone-salmeterol (ADVAIR DISKUS) 250-50 MCG/DOSE inhaler Inhale 1 puff into the lungs 2 times daily.   • VENTOLIN  (90 Base) MCG/ACT inhaler inhale 2 puffs by mouth every 4 hours as needed for wheezing   • SOFTCLIX LANCETS Misc USE TO CHECK BLOOD SUGAR 9 TIMES DAILY   • aspirin 81 MG tablet Take 162 mg by mouth.   • Cholecalciferol (VITAMIN D) 2000 units capsule Take 2,000 mg by mouth.   • coenzyme Q10 100 MG capsule Take 100 mg by mouth.   • cyanocobalamin, vitamin B12, 100 MCG tablet Take 100 mg by mouth.   • halobetasol (ULTRAVATE) 0.05 % ointment APPLY TO AFFECTED AREAS TWICE DAILY FOR 2 WEEKS   • nitroGLYcerin (NITROSTAT) 0.4 MG sublingual tablet Place 0.4 mg under the tongue.   • Glucose Blood (BLOOD GLUCOSE TEST STRIPS) Strip TEST 9 TIMES A DAY   • ACCU-CHEK DMITRIY PLUS test strip TEST 9 TIMES A DAY      PHARMACY to use:           Pharmacy preference(s) on file: The requested medication(s) have been refilled.  The refill request(s) was within refill protocol.  Refill(s) were authorized by pharmacist.    CALL BACK INFO: Ok to leave response  (including medical information) with family member or on ans. machine  ROUTING: Patient's physician / staff    PCP: Indu Vyas MD         INS: Payor: [facility]REYER / Plan: [facility] - [facility] / Product Type: [place] Risk   ADDRESS:  [address] [place] [id number]        No indicators present

## 2023-09-14 NOTE — ED PROVIDER NOTE - PHYSICAL EXAMINATION
General:     NAD, well-nourished, well-appearing  Head:     NC/AT, EOMI, oral mucosa moist  Neck:     supple  Lungs:     CTA b/l, no w/r/r  CVS:     S1S2, RRR, no m/g/r  Abd:     +BS, s/nt/nd, no organomegaly  Ext:    2+ radial and pedal pulses, no c/c/e  Neuro: grossly intact Detail Level: Detailed

## 2023-09-14 NOTE — ED PROVIDER NOTE - NS_EDPROVIDERDISPOUSERTYPE_ED_A_ED
Major Shift Events:  No new neuro changes.  OR for trach cancelled due to PEEP of 8.  PEEP weaned to 5 later in shift.  Still intermittently drops SpO2 levels to mid 80s, improves with repositioning, suctioning, and briefly increasing FiO2.  Precedex increased to 0.7 for anxiety.  Off floor to MRI of cervical spine at this time.  Sister updated by this RN - passed along her info to neuro critical care team for them to call her and update her as well.    Plan: Trend fevers.  Pain control.  Neuro exams.      For vital signs and complete assessments, please see documentation flowsheets.      Attending Attestation (For Attendings USE Only)...

## 2023-09-14 NOTE — ED PROVIDER NOTE - PATIENT PORTAL LINK FT
You can access the FollowMyHealth Patient Portal offered by [facility] by registering at the following website: [email or web address]. By joining [employer]’s FollowMyHealth portal, you will also be able to view your health information using other applications (apps) compatible with our system.

## 2023-09-14 NOTE — ED PROVIDER NOTE - TEMPLATE, MLM
Assigned @ 16:31 Assigned @ 16:31; Per ADN reassess after confirmation from Lab about result Assigned @ 16:31; Per ADN reassess after confirmation from Lab about result; Assigned @ 17:35 Respiratory

## 2023-09-14 NOTE — ED PROVIDER NOTE - CLINICAL SUMMARY MEDICAL DECISION MAKING FREE TEXT BOX
pt p/w chest for 3 hours, ekg was normal, cxr negative, labs normal, except BS was high, was given IVF and Insulin, BS was lower, normal trop r/o ACS , pt advised to f/u PMD

## 2023-09-14 NOTE — ED PROVIDER NOTE - EKG #1 DATE/TIME
Interval History: pt seen after PD catheter out    Review of Systems   Constitutional: Positive for activity change, appetite change and fatigue. Negative for diaphoresis.   HENT: Negative for trouble swallowing.    Eyes: Negative for photophobia and pain.   Respiratory: Negative for cough, choking, shortness of breath and wheezing.    Gastrointestinal: Positive for abdominal pain, blood in stool, diarrhea and nausea. Negative for abdominal distention.   Genitourinary: Negative for dysuria and flank pain.     Objective:     Vital Signs (Most Recent):  Temp: 98.3 °F (36.8 °C) (03/14/19 1600)  Pulse: 95 (03/14/19 0950)  Resp: (20) (03/14/19 0950)  BP: 112/71 (03/14/19 0950)  SpO2: 100 % (03/14/19 0950) Vital Signs (24h Range):  Temp:  [97.8 °F (36.6 °C)-98.9 °F (37.2 °C)] 98.3 °F (36.8 °C)  Pulse:  [] 95  Resp:  [7-29] 17  SpO2:  [93 %-100 %] 100 %  BP: ()/(50-71) 112/71     Weight: 97.2 kg (214 lb 4.6 oz)  Body mass index is 33.56 kg/m².    Estimated Creatinine Clearance: 8 mL/min (A) (based on SCr of 10.2 mg/dL (H)).    Physical Exam   Constitutional: She is oriented to person, place, and time. She appears well-developed and well-nourished.   HENT:   Head: Normocephalic and atraumatic.   Eyes: EOM are normal. Pupils are equal, round, and reactive to light. No scleral icterus.   Neck: Normal range of motion. Neck supple.   Cardiovascular: Normal rate and regular rhythm.   Murmur heard.  Pulmonary/Chest: Effort normal and breath sounds normal. No respiratory distress. She has no wheezes. She has no rales.   Abdominal: Soft. Bowel sounds are normal. There is tenderness. There is no rebound.   2 dressings seen, PD catheter not present. Left femoral trialysis catheter   Musculoskeletal: She exhibits no edema or tenderness.   Lymphadenopathy:     She has no cervical adenopathy.   Neurological: She is alert and oriented to person, place, and time.   BL UE and BL LE no gross motor deficits   Skin: Skin is warm.    Dry skin noted on abdomen   Psychiatric:   Calm and cooperative   Nursing note and vitals reviewed.      Significant Labs:   CBC:   Recent Labs   Lab 03/13/19  0651  03/13/19  2318 03/14/19  0819 03/14/19  1626   WBC 11.20  --   --  17.87*  --    HGB 6.0*   < > 10.2* 8.1*  8.1* 6.9*   HCT 19.9*   < > 32.2* 25.5*  25.5* 21.4*   *  --   --  311  --     < > = values in this interval not displayed.     CMP:   Recent Labs   Lab 03/13/19  0918 03/13/19  1645 03/14/19  0818 03/14/19  1634   * 131* 133*  133* 132*   K 4.3 4.3 4.3  4.3 4.6   CL 89* 91* 92*  92* 93*   CO2 22* 28 27  27 24   * 91 96  96 102   BUN 51* 60* 71*  71* 67*   CREATININE 8.9* 9.1* 10.0*  10.0* 10.2*   CALCIUM 7.0* 7.1* 7.4*  7.4* 7.3*   PROT 5.4* 5.1* 5.5*  --    ALBUMIN 1.6* 1.6* 1.7*  1.7* 1.5*   BILITOT 0.2 0.6 0.4  --    ALKPHOS 101 93 92  --    AST 15 26 20  --    ALT <5* <5* <5*  --    ANIONGAP 18* 12 14  14 15   EGFRNONAA 5* 5* 4*  4* 4*     Microbiology Results (last 7 days)     Procedure Component Value Units Date/Time    Fungus culture [[id number]] Collected:  03/12/19 1431    Order Status:  Completed Specimen:  Body Fluid from Peritoneal Fluid Updated:  03/14/19 1100     Fungus (Mycology) Culture Culture in progress    Aerobic culture [[id number]] Collected:  03/12/19 1431    Order Status:  Completed Specimen:  Body Fluid from Peritoneal Fluid Updated:  03/14/19 1055     Aerobic Bacterial Culture No growth    Blood culture [[id number]] Collected:  03/13/19 1203    Order Status:  Completed Specimen:  Blood Updated:  03/14/19 0115     Blood Culture, Routine No Growth to date    Narrative:       Collection has been rescheduled by [facility] at 03/13/2019 10:00 Reason:   doctors & nurses are trying to start a line  Collection has been rescheduled by [facility] at 03/13/2019 11:03 Reason:   the nurse Gilmer will call if they need lab to draw  Collection has been rescheduled by [facility] at 03/13/2019 10:00 Reason:   doctors & nurses  are trying to start a line  Collection has been rescheduled by [facility] at 03/13/2019 11:03 Reason:   the nurse Gilmer will call if they need lab to draw    Blood culture [[id number]] Collected:  03/13/19 1152    Order Status:  Completed Specimen:  Blood Updated:  03/14/19 0115     Blood Culture, Routine No Growth to date    Narrative:       Collection has been rescheduled by [facility] at 03/13/2019 10:00 Reason:   doctors & nurses are trying to start a line  Collection has been rescheduled by [facility] at 03/13/2019 11:03 Reason:   the nurse Gilmer will call if they need lab to draw  Collection has been rescheduled by [facility] at 03/13/2019 10:00 Reason:   doctors & nurses are trying to start a line  Collection has been rescheduled by [facility] at 03/13/2019 11:03 Reason:   the nurse Gilmer will call if they need lab to draw    Gram stain [[id number]] Collected:  03/12/19 1431    Order Status:  Completed Specimen:  Body Fluid from Peritoneal Fluid Updated:  03/12/19 2214     Gram Stain Result Rare WBC's      No organisms seen    Aerobic culture [[id number]] Collected:  03/08/19 1729    Order Status:  Completed Specimen:  Body Fluid from Peritoneal Fluid Updated:  03/12/19 1014     Aerobic Bacterial Culture No growth    Culture, Anaerobe [[id number]] Collected:  03/08/19 1729    Order Status:  Completed Specimen:  Body Fluid from Peritoneal Fluid Updated:  03/12/19 0733     Anaerobic Culture Culture in progress    Blood culture [[id number]] Collected:  03/03/19 1625    Order Status:  Completed Specimen:  Blood Updated:  03/08/19 2012     Blood Culture, Routine No growth after 5 days.    Culture, Anaerobe [[id number]] Collected:  03/01/19 1542    Order Status:  Completed Specimen:  Body Fluid from Abdomen Updated:  03/08/19 0819     Anaerobic Culture No anaerobes isolated        All pertinent labs within the past 24 hours have been reviewed.    Significant Imaging: I have reviewed all pertinent imaging results/findings within the past 24 hours.   14-Sep-2023 01:13

## 2023-11-03 ENCOUNTER — OUTPATIENT (OUTPATIENT)
Dept: OUTPATIENT SERVICES | Facility: HOSPITAL | Age: 38
LOS: 1 days | End: 2023-11-03
Payer: MEDICAID

## 2023-11-03 ENCOUNTER — NON-APPOINTMENT (OUTPATIENT)
Age: 38
End: 2023-11-03

## 2023-11-03 ENCOUNTER — APPOINTMENT (OUTPATIENT)
Dept: FAMILY MEDICINE | Facility: HOSPITAL | Age: 38
End: 2023-11-03

## 2023-11-03 VITALS
OXYGEN SATURATION: 97 % | DIASTOLIC BLOOD PRESSURE: 72 MMHG | TEMPERATURE: 97.6 F | SYSTOLIC BLOOD PRESSURE: 115 MMHG | HEART RATE: 84 BPM | RESPIRATION RATE: 16 BRPM | BODY MASS INDEX: 31.74 KG/M2 | WEIGHT: 168 LBS

## 2023-11-03 DIAGNOSIS — Z34.90 ENCOUNTER FOR SUPERVISION OF NORMAL PREGNANCY, UNSPECIFIED, UNSPECIFIED TRIMESTER: ICD-10-CM

## 2023-11-03 DIAGNOSIS — Z32.00 ENCOUNTER FOR PREGNANCY TEST, RESULT UNKNOWN: ICD-10-CM

## 2023-11-03 DIAGNOSIS — Z34.00 ENCOUNTER FOR SUPERVISION OF NORMAL FIRST PREGNANCY, UNSPECIFIED TRIMESTER: ICD-10-CM

## 2023-11-03 PROCEDURE — 81422 FETAL CHRMOML MICRODELTJ: CPT

## 2023-11-03 PROCEDURE — 87389 HIV-1 AG W/HIV-1&-2 AB AG IA: CPT

## 2023-11-03 PROCEDURE — 81002 URINALYSIS NONAUTO W/O SCOPE: CPT

## 2023-11-03 PROCEDURE — 87340 HEPATITIS B SURFACE AG IA: CPT

## 2023-11-03 PROCEDURE — 81025 URINE PREGNANCY TEST: CPT

## 2023-11-03 PROCEDURE — 83655 ASSAY OF LEAD: CPT

## 2023-11-03 PROCEDURE — 83020 HEMOGLOBIN ELECTROPHORESIS: CPT

## 2023-11-03 PROCEDURE — G0463: CPT

## 2023-11-03 PROCEDURE — 81001 URINALYSIS AUTO W/SCOPE: CPT

## 2023-11-03 PROCEDURE — 86762 RUBELLA ANTIBODY: CPT

## 2023-11-03 PROCEDURE — 81329 SMN1 GENE DOS/DELETION ALYS: CPT

## 2023-11-03 PROCEDURE — 86765 RUBEOLA ANTIBODY: CPT

## 2023-11-03 PROCEDURE — 81420 FETAL CHRMOML ANEUPLOIDY: CPT

## 2023-11-03 PROCEDURE — 87086 URINE CULTURE/COLONY COUNT: CPT

## 2023-11-03 PROCEDURE — 81220 CFTR GENE COM VARIANTS: CPT

## 2023-11-03 PROCEDURE — 80307 DRUG TEST PRSMV CHEM ANLYZR: CPT

## 2023-11-03 PROCEDURE — 86901 BLOOD TYPING SEROLOGIC RH(D): CPT

## 2023-11-03 PROCEDURE — 86780 TREPONEMA PALLIDUM: CPT

## 2023-11-03 PROCEDURE — 86850 RBC ANTIBODY SCREEN: CPT

## 2023-11-03 PROCEDURE — 86480 TB TEST CELL IMMUN MEASURE: CPT

## 2023-11-03 PROCEDURE — 80053 COMPREHEN METABOLIC PANEL: CPT

## 2023-11-03 PROCEDURE — 86900 BLOOD TYPING SEROLOGIC ABO: CPT

## 2023-11-03 PROCEDURE — 81509 FTL CGEN ABNOR 3 PROTEINS: CPT

## 2023-11-03 RX ORDER — .BETA.-CAROTENE, ASCORBIC ACID, CHOLECALCIFEROL, .ALPHA.-TOCOPHEROL ACETATE, DL-, THIAMINE MONONITRATE, RIBOFLAVIN, NIACINAMIDE, PYRIDOXINE HYDROCHLORIDE, FOLIC ACID, CYANOCOBALAMIN, CALCIUM PANTOTHENATE, CALCIUM CARBONATE, FERROUS FUMARATE, ZINC OXIDE, AND DOCUSATE SODIUM 1000; 100; 400; 30; 3; 3; 15; 20; 1; 12; 7; 200; 29; 20; 25 [IU]/1; MG/1; [IU]/1; [IU]/1; MG/1; MG/1; MG/1; MG/1; MG/1; UG/1; MG/1; MG/1; MG/1; MG/1; MG/1
29-1 TABLET, COATED ORAL DAILY
Qty: 90 | Refills: 0 | Status: ACTIVE | OUTPATIENT
Start: 2023-11-03

## 2023-11-05 ENCOUNTER — NON-APPOINTMENT (OUTPATIENT)
Age: 38
End: 2023-11-05

## 2023-11-06 ENCOUNTER — APPOINTMENT (OUTPATIENT)
Dept: ANTEPARTUM | Facility: CLINIC | Age: 38
End: 2023-11-06
Payer: MEDICAID

## 2023-11-06 ENCOUNTER — NON-APPOINTMENT (OUTPATIENT)
Age: 38
End: 2023-11-06

## 2023-11-06 ENCOUNTER — ASOB RESULT (OUTPATIENT)
Age: 38
End: 2023-11-06

## 2023-11-06 PROBLEM — Z34.90 PREGNANCY: Status: ACTIVE | Noted: 2021-03-19

## 2023-11-06 PROBLEM — Z32.00 ENCOUNTER FOR PREGNANCY TEST: Status: ACTIVE | Noted: 2023-11-03

## 2023-11-06 PROCEDURE — 76801 OB US < 14 WKS SINGLE FETUS: CPT | Mod: 26

## 2023-11-06 PROCEDURE — 76815 OB US LIMITED FETUS(S): CPT

## 2023-11-07 ENCOUNTER — APPOINTMENT (OUTPATIENT)
Dept: ANTEPARTUM | Facility: CLINIC | Age: 38
End: 2023-11-07

## 2023-11-10 ENCOUNTER — NON-APPOINTMENT (OUTPATIENT)
Age: 38
End: 2023-11-10

## 2023-11-14 ENCOUNTER — APPOINTMENT (OUTPATIENT)
Dept: OBGYN | Facility: HOSPITAL | Age: 38
End: 2023-11-14

## 2023-11-16 ENCOUNTER — NON-APPOINTMENT (OUTPATIENT)
Age: 38
End: 2023-11-16

## 2023-11-17 ENCOUNTER — LABORATORY RESULT (OUTPATIENT)
Age: 38
End: 2023-11-17

## 2023-11-17 ENCOUNTER — OUTPATIENT (OUTPATIENT)
Dept: OUTPATIENT SERVICES | Facility: HOSPITAL | Age: 38
LOS: 1 days | End: 2023-11-17
Payer: MEDICAID

## 2023-11-17 ENCOUNTER — APPOINTMENT (OUTPATIENT)
Dept: MATERNAL FETAL MEDICINE | Facility: CLINIC | Age: 38
End: 2023-11-17
Payer: MEDICAID

## 2023-11-17 VITALS
OXYGEN SATURATION: 99 % | WEIGHT: 164 LBS | SYSTOLIC BLOOD PRESSURE: 108 MMHG | BODY MASS INDEX: 30.96 KG/M2 | HEART RATE: 81 BPM | HEIGHT: 61 IN | DIASTOLIC BLOOD PRESSURE: 72 MMHG

## 2023-11-17 DIAGNOSIS — O09.521 SUPERVISION OF ELDERLY MULTIGRAVIDA, FIRST TRIMESTER: ICD-10-CM

## 2023-11-17 DIAGNOSIS — R76.12 NONSPECIFIC REACTION TO CELL MEDIATED IMMUNITY MEASUREMENT OF GAMMA INTERFERON ANTIGEN RESPONSE WITHOUT ACTIVE TUBERCULOSIS: ICD-10-CM

## 2023-11-17 DIAGNOSIS — K08.89 OTHER SPECIFIED DISORDERS OF TEETH AND SUPPORTING STRUCTURES: ICD-10-CM

## 2023-11-17 DIAGNOSIS — O09.91 SUPERVISION OF HIGH RISK PREGNANCY, UNSPECIFIED, FIRST TRIMESTER: ICD-10-CM

## 2023-11-17 DIAGNOSIS — E11.9 TYPE 2 DIABETES MELLITUS WITHOUT COMPLICATIONS: ICD-10-CM

## 2023-11-17 DIAGNOSIS — O09.899 SUPERVISION OF OTHER HIGH RISK PREGNANCIES, UNSPECIFIED TRIMESTER: ICD-10-CM

## 2023-11-17 DIAGNOSIS — R82.71 BACTERIURIA: ICD-10-CM

## 2023-11-17 LAB
BILIRUB UR QL STRIP: NORMAL
COLLECTION METHOD: NORMAL
CREAT ?TM UR-MCNC: 117 MG/DL — SIGNIFICANT CHANGE UP
CREAT ?TM UR-MCNC: 117 MG/DL — SIGNIFICANT CHANGE UP
GLUCOSE UR-MCNC: NORMAL
HCG UR QL: 0.2 EU/DL
HCT VFR BLD CALC: 41.7 % — SIGNIFICANT CHANGE UP (ref 34.5–45)
HCT VFR BLD CALC: 41.7 % — SIGNIFICANT CHANGE UP (ref 34.5–45)
HGB BLD-MCNC: 13.6 G/DL — SIGNIFICANT CHANGE UP (ref 11.5–15.5)
HGB BLD-MCNC: 13.6 G/DL — SIGNIFICANT CHANGE UP (ref 11.5–15.5)
HGB UR QL STRIP.AUTO: NORMAL
KETONES UR-MCNC: NORMAL
LEUKOCYTE ESTERASE UR QL STRIP: NORMAL
MCHC RBC-ENTMCNC: 28.9 PG — SIGNIFICANT CHANGE UP (ref 27–34)
MCHC RBC-ENTMCNC: 28.9 PG — SIGNIFICANT CHANGE UP (ref 27–34)
MCHC RBC-ENTMCNC: 32.6 GM/DL — SIGNIFICANT CHANGE UP (ref 32–36)
MCHC RBC-ENTMCNC: 32.6 GM/DL — SIGNIFICANT CHANGE UP (ref 32–36)
MCV RBC AUTO: 88.7 FL — SIGNIFICANT CHANGE UP (ref 80–100)
MCV RBC AUTO: 88.7 FL — SIGNIFICANT CHANGE UP (ref 80–100)
NITRITE UR QL STRIP: NORMAL
PH UR STRIP: 6
PLATELET # BLD AUTO: 277 K/UL — SIGNIFICANT CHANGE UP (ref 150–400)
PLATELET # BLD AUTO: 277 K/UL — SIGNIFICANT CHANGE UP (ref 150–400)
PROT ?TM UR-MCNC: 8 MG/DL — SIGNIFICANT CHANGE UP (ref 0–12)
PROT ?TM UR-MCNC: 8 MG/DL — SIGNIFICANT CHANGE UP (ref 0–12)
PROT UR STRIP-MCNC: NORMAL
PROT/CREAT UR-RTO: 0.1 RATIO — SIGNIFICANT CHANGE UP (ref 0–0.2)
PROT/CREAT UR-RTO: 0.1 RATIO — SIGNIFICANT CHANGE UP (ref 0–0.2)
RBC # BLD: 4.7 M/UL — SIGNIFICANT CHANGE UP (ref 3.8–5.2)
RBC # BLD: 4.7 M/UL — SIGNIFICANT CHANGE UP (ref 3.8–5.2)
RBC # FLD: 12.9 % — SIGNIFICANT CHANGE UP (ref 10.3–14.5)
RBC # FLD: 12.9 % — SIGNIFICANT CHANGE UP (ref 10.3–14.5)
SP GR UR STRIP: 1.01
WBC # BLD: 8.15 K/UL — SIGNIFICANT CHANGE UP (ref 3.8–10.5)
WBC # BLD: 8.15 K/UL — SIGNIFICANT CHANGE UP (ref 3.8–10.5)
WBC # FLD AUTO: 8.15 K/UL — SIGNIFICANT CHANGE UP (ref 3.8–10.5)
WBC # FLD AUTO: 8.15 K/UL — SIGNIFICANT CHANGE UP (ref 3.8–10.5)

## 2023-11-17 PROCEDURE — 87086 URINE CULTURE/COLONY COUNT: CPT

## 2023-11-17 PROCEDURE — 84550 ASSAY OF BLOOD/URIC ACID: CPT

## 2023-11-17 PROCEDURE — 36415 COLL VENOUS BLD VENIPUNCTURE: CPT

## 2023-11-17 PROCEDURE — G0108: CPT

## 2023-11-17 PROCEDURE — G0452: CPT | Mod: 26

## 2023-11-17 PROCEDURE — 83036 HEMOGLOBIN GLYCOSYLATED A1C: CPT

## 2023-11-17 PROCEDURE — 84156 ASSAY OF PROTEIN URINE: CPT

## 2023-11-17 PROCEDURE — 85027 COMPLETE CBC AUTOMATED: CPT

## 2023-11-17 PROCEDURE — 86706 HEP B SURFACE ANTIBODY: CPT

## 2023-11-17 PROCEDURE — 80053 COMPREHEN METABOLIC PANEL: CPT

## 2023-11-17 PROCEDURE — 82570 ASSAY OF URINE CREATININE: CPT

## 2023-11-17 PROCEDURE — 84443 ASSAY THYROID STIM HORMONE: CPT

## 2023-11-17 PROCEDURE — 83615 LACTATE (LD) (LDH) ENZYME: CPT

## 2023-11-17 PROCEDURE — 81243 FMR1 GEN ALY DETC ABNL ALLEL: CPT

## 2023-11-17 PROCEDURE — G0463: CPT

## 2023-11-17 PROCEDURE — G0108 DIAB MANAGE TRN  PER INDIV: CPT

## 2023-11-17 PROCEDURE — 99203 OFFICE O/P NEW LOW 30 MIN: CPT

## 2023-11-17 RX ORDER — KRILL/OM-3/DHA/EPA/PHOSPHO/AST 1000-230MG
81 CAPSULE ORAL
Qty: 360 | Refills: 0 | Status: ACTIVE | COMMUNITY
Start: 2023-11-17 | End: 1900-01-01

## 2023-11-17 RX ORDER — BLOOD-GLUCOSE,RECEIVER,CONT
EACH MISCELLANEOUS
Qty: 1 | Refills: 0 | Status: ACTIVE | COMMUNITY
Start: 2023-11-17 | End: 1900-01-01

## 2023-11-18 LAB
A1C WITH ESTIMATED AVERAGE GLUCOSE RESULT: 8.8 % — HIGH (ref 4–5.6)
A1C WITH ESTIMATED AVERAGE GLUCOSE RESULT: 8.8 % — HIGH (ref 4–5.6)
ALBUMIN SERPL ELPH-MCNC: 4.7 G/DL — SIGNIFICANT CHANGE UP (ref 3.3–5)
ALBUMIN SERPL ELPH-MCNC: 4.7 G/DL — SIGNIFICANT CHANGE UP (ref 3.3–5)
ALP SERPL-CCNC: 88 U/L — SIGNIFICANT CHANGE UP (ref 40–120)
ALP SERPL-CCNC: 88 U/L — SIGNIFICANT CHANGE UP (ref 40–120)
ALT FLD-CCNC: 11 U/L — SIGNIFICANT CHANGE UP (ref 10–45)
ALT FLD-CCNC: 11 U/L — SIGNIFICANT CHANGE UP (ref 10–45)
ANION GAP SERPL CALC-SCNC: 12 MMOL/L — SIGNIFICANT CHANGE UP (ref 5–17)
ANION GAP SERPL CALC-SCNC: 12 MMOL/L — SIGNIFICANT CHANGE UP (ref 5–17)
AST SERPL-CCNC: 13 U/L — SIGNIFICANT CHANGE UP (ref 10–40)
AST SERPL-CCNC: 13 U/L — SIGNIFICANT CHANGE UP (ref 10–40)
BILIRUB SERPL-MCNC: 0.4 MG/DL — SIGNIFICANT CHANGE UP (ref 0.2–1.2)
BILIRUB SERPL-MCNC: 0.4 MG/DL — SIGNIFICANT CHANGE UP (ref 0.2–1.2)
BUN SERPL-MCNC: 5 MG/DL — LOW (ref 7–23)
BUN SERPL-MCNC: 5 MG/DL — LOW (ref 7–23)
CALCIUM SERPL-MCNC: 9.4 MG/DL — SIGNIFICANT CHANGE UP (ref 8.4–10.5)
CALCIUM SERPL-MCNC: 9.4 MG/DL — SIGNIFICANT CHANGE UP (ref 8.4–10.5)
CHLORIDE SERPL-SCNC: 101 MMOL/L — SIGNIFICANT CHANGE UP (ref 96–108)
CHLORIDE SERPL-SCNC: 101 MMOL/L — SIGNIFICANT CHANGE UP (ref 96–108)
CO2 SERPL-SCNC: 24 MMOL/L — SIGNIFICANT CHANGE UP (ref 22–31)
CO2 SERPL-SCNC: 24 MMOL/L — SIGNIFICANT CHANGE UP (ref 22–31)
CREAT SERPL-MCNC: 0.5 MG/DL — SIGNIFICANT CHANGE UP (ref 0.5–1.3)
CREAT SERPL-MCNC: 0.5 MG/DL — SIGNIFICANT CHANGE UP (ref 0.5–1.3)
EGFR: 123 ML/MIN/1.73M2 — SIGNIFICANT CHANGE UP
EGFR: 123 ML/MIN/1.73M2 — SIGNIFICANT CHANGE UP
ESTIMATED AVERAGE GLUCOSE: 206 MG/DL — HIGH (ref 68–114)
ESTIMATED AVERAGE GLUCOSE: 206 MG/DL — HIGH (ref 68–114)
GLUCOSE SERPL-MCNC: 174 MG/DL — HIGH (ref 70–99)
GLUCOSE SERPL-MCNC: 174 MG/DL — HIGH (ref 70–99)
HBV SURFACE AB SER-ACNC: SIGNIFICANT CHANGE UP
HBV SURFACE AB SER-ACNC: SIGNIFICANT CHANGE UP
LDH SERPL L TO P-CCNC: 165 U/L — SIGNIFICANT CHANGE UP (ref 50–242)
LDH SERPL L TO P-CCNC: 165 U/L — SIGNIFICANT CHANGE UP (ref 50–242)
POTASSIUM SERPL-MCNC: 4.8 MMOL/L — SIGNIFICANT CHANGE UP (ref 3.5–5.3)
POTASSIUM SERPL-MCNC: 4.8 MMOL/L — SIGNIFICANT CHANGE UP (ref 3.5–5.3)
POTASSIUM SERPL-SCNC: 4.8 MMOL/L — SIGNIFICANT CHANGE UP (ref 3.5–5.3)
POTASSIUM SERPL-SCNC: 4.8 MMOL/L — SIGNIFICANT CHANGE UP (ref 3.5–5.3)
PROT SERPL-MCNC: 7.2 G/DL — SIGNIFICANT CHANGE UP (ref 6–8.3)
PROT SERPL-MCNC: 7.2 G/DL — SIGNIFICANT CHANGE UP (ref 6–8.3)
SODIUM SERPL-SCNC: 137 MMOL/L — SIGNIFICANT CHANGE UP (ref 135–145)
SODIUM SERPL-SCNC: 137 MMOL/L — SIGNIFICANT CHANGE UP (ref 135–145)
TSH SERPL-MCNC: 1.53 UIU/ML — SIGNIFICANT CHANGE UP (ref 0.27–4.2)
TSH SERPL-MCNC: 1.53 UIU/ML — SIGNIFICANT CHANGE UP (ref 0.27–4.2)
URATE SERPL-MCNC: 2.7 MG/DL — SIGNIFICANT CHANGE UP (ref 2.5–7)
URATE SERPL-MCNC: 2.7 MG/DL — SIGNIFICANT CHANGE UP (ref 2.5–7)

## 2023-11-19 LAB
CULTURE RESULTS: SIGNIFICANT CHANGE UP
CULTURE RESULTS: SIGNIFICANT CHANGE UP
SPECIMEN SOURCE: SIGNIFICANT CHANGE UP
SPECIMEN SOURCE: SIGNIFICANT CHANGE UP

## 2023-11-22 ENCOUNTER — NON-APPOINTMENT (OUTPATIENT)
Age: 38
End: 2023-11-22

## 2023-11-22 ENCOUNTER — APPOINTMENT (OUTPATIENT)
Dept: MATERNAL FETAL MEDICINE | Facility: CLINIC | Age: 38
End: 2023-11-22
Payer: MEDICAID

## 2023-11-22 ENCOUNTER — OUTPATIENT (OUTPATIENT)
Dept: OUTPATIENT SERVICES | Facility: HOSPITAL | Age: 38
LOS: 1 days | End: 2023-11-22
Payer: MEDICAID

## 2023-11-22 VITALS
BODY MASS INDEX: 30.78 KG/M2 | DIASTOLIC BLOOD PRESSURE: 68 MMHG | HEIGHT: 61 IN | HEART RATE: 83 BPM | SYSTOLIC BLOOD PRESSURE: 110 MMHG | WEIGHT: 163 LBS | OXYGEN SATURATION: 99 %

## 2023-11-22 DIAGNOSIS — Z87.19 PERSONAL HISTORY OF OTHER DISEASES OF THE DIGESTIVE SYSTEM: ICD-10-CM

## 2023-11-22 DIAGNOSIS — R10.30 LOWER ABDOMINAL PAIN, UNSPECIFIED: ICD-10-CM

## 2023-11-22 DIAGNOSIS — Z86.19 PERSONAL HISTORY OF OTHER INFECTIOUS AND PARASITIC DISEASES: ICD-10-CM

## 2023-11-22 DIAGNOSIS — O09.899 SUPERVISION OF OTHER HIGH RISK PREGNANCIES, UNSPECIFIED TRIMESTER: ICD-10-CM

## 2023-11-22 LAB
BILIRUB UR QL STRIP: NORMAL
COLLECTION METHOD: NORMAL
GLUCOSE UR-MCNC: NORMAL
HCG UR QL: 0.2 EU/DL
HGB UR QL STRIP.AUTO: NORMAL
KETONES UR-MCNC: NORMAL
LEUKOCYTE ESTERASE UR QL STRIP: NORMAL
NITRITE UR QL STRIP: NORMAL
PH UR STRIP: 7
PROT UR STRIP-MCNC: NORMAL
SP GR UR STRIP: 1.02

## 2023-11-22 PROCEDURE — G0108 DIAB MANAGE TRN  PER INDIV: CPT

## 2023-11-22 PROCEDURE — G0463: CPT

## 2023-11-22 PROCEDURE — G0108: CPT

## 2023-11-22 PROCEDURE — 99213 OFFICE O/P EST LOW 20 MIN: CPT | Mod: GE,25

## 2023-11-24 LAB
FRAGILE X PROTEIN (FMRP) PNL BLD: SIGNIFICANT CHANGE UP
FRAGILE X PROTEIN (FMRP) PNL BLD: SIGNIFICANT CHANGE UP

## 2023-11-27 ENCOUNTER — OUTPATIENT (OUTPATIENT)
Dept: OUTPATIENT SERVICES | Facility: HOSPITAL | Age: 38
LOS: 1 days | End: 2023-11-27
Payer: MEDICAID

## 2023-11-27 DIAGNOSIS — O24.112 PRE-EXISTING TYPE 2 DIABETES MELLITUS, IN PREGNANCY, SECOND TRIMESTER: ICD-10-CM

## 2023-11-27 DIAGNOSIS — R76.12 NONSPECIFIC REACTION TO CELL MEDIATED IMMUNITY MEASUREMENT OF GAMMA INTERFERON ANTIGEN RESPONSE WITHOUT ACTIVE TUBERCULOSIS: ICD-10-CM

## 2023-11-27 PROCEDURE — 71046 X-RAY EXAM CHEST 2 VIEWS: CPT | Mod: 26

## 2023-11-27 PROCEDURE — 71046 X-RAY EXAM CHEST 2 VIEWS: CPT

## 2023-11-27 PROCEDURE — 93010 ELECTROCARDIOGRAM REPORT: CPT

## 2023-11-27 PROCEDURE — 93005 ELECTROCARDIOGRAM TRACING: CPT

## 2023-11-29 ENCOUNTER — NON-APPOINTMENT (OUTPATIENT)
Age: 38
End: 2023-11-29

## 2023-12-01 ENCOUNTER — LABORATORY RESULT (OUTPATIENT)
Age: 38
End: 2023-12-01

## 2023-12-01 ENCOUNTER — OUTPATIENT (OUTPATIENT)
Dept: OUTPATIENT SERVICES | Facility: HOSPITAL | Age: 38
LOS: 1 days | End: 2023-12-01
Payer: MEDICAID

## 2023-12-01 ENCOUNTER — APPOINTMENT (OUTPATIENT)
Dept: MATERNAL FETAL MEDICINE | Facility: CLINIC | Age: 38
End: 2023-12-01
Payer: MEDICAID

## 2023-12-01 ENCOUNTER — ASOB RESULT (OUTPATIENT)
Age: 38
End: 2023-12-01

## 2023-12-01 ENCOUNTER — APPOINTMENT (OUTPATIENT)
Dept: ANTEPARTUM | Facility: CLINIC | Age: 38
End: 2023-12-01
Payer: MEDICAID

## 2023-12-01 ENCOUNTER — NON-APPOINTMENT (OUTPATIENT)
Age: 38
End: 2023-12-01

## 2023-12-01 VITALS
OXYGEN SATURATION: 99 % | HEIGHT: 61 IN | DIASTOLIC BLOOD PRESSURE: 68 MMHG | BODY MASS INDEX: 30.21 KG/M2 | HEART RATE: 80 BPM | SYSTOLIC BLOOD PRESSURE: 106 MMHG | WEIGHT: 160 LBS

## 2023-12-01 DIAGNOSIS — O09.521 SUPERVISION OF ELDERLY MULTIGRAVIDA, FIRST TRIMESTER: ICD-10-CM

## 2023-12-01 DIAGNOSIS — O24.111 PRE-EXISTING TYPE 2 DIABETES MELLITUS, IN PREGNANCY, FIRST TRIMESTER: ICD-10-CM

## 2023-12-01 DIAGNOSIS — O09.899 SUPERVISION OF OTHER HIGH RISK PREGNANCIES, UNSPECIFIED TRIMESTER: ICD-10-CM

## 2023-12-01 DIAGNOSIS — O09.90 SUPERVISION OF HIGH RISK PREGNANCY, UNSPECIFIED, UNSPECIFIED TRIMESTER: ICD-10-CM

## 2023-12-01 LAB
ALBUMIN SERPL ELPH-MCNC: 4.8 G/DL — SIGNIFICANT CHANGE UP (ref 3.3–5)
ALBUMIN SERPL ELPH-MCNC: 4.8 G/DL — SIGNIFICANT CHANGE UP (ref 3.3–5)
ALP SERPL-CCNC: 79 U/L — SIGNIFICANT CHANGE UP (ref 40–120)
ALP SERPL-CCNC: 79 U/L — SIGNIFICANT CHANGE UP (ref 40–120)
ALT FLD-CCNC: 13 U/L — SIGNIFICANT CHANGE UP (ref 10–45)
ALT FLD-CCNC: 13 U/L — SIGNIFICANT CHANGE UP (ref 10–45)
ANION GAP SERPL CALC-SCNC: 13 MMOL/L — SIGNIFICANT CHANGE UP (ref 5–17)
ANION GAP SERPL CALC-SCNC: 13 MMOL/L — SIGNIFICANT CHANGE UP (ref 5–17)
AST SERPL-CCNC: 16 U/L — SIGNIFICANT CHANGE UP (ref 10–40)
AST SERPL-CCNC: 16 U/L — SIGNIFICANT CHANGE UP (ref 10–40)
BILIRUB SERPL-MCNC: 0.6 MG/DL — SIGNIFICANT CHANGE UP (ref 0.2–1.2)
BILIRUB SERPL-MCNC: 0.6 MG/DL — SIGNIFICANT CHANGE UP (ref 0.2–1.2)
BILIRUB UR QL STRIP: NORMAL
BUN SERPL-MCNC: 5 MG/DL — LOW (ref 7–23)
BUN SERPL-MCNC: 5 MG/DL — LOW (ref 7–23)
CALCIUM SERPL-MCNC: 9.7 MG/DL — SIGNIFICANT CHANGE UP (ref 8.4–10.5)
CALCIUM SERPL-MCNC: 9.7 MG/DL — SIGNIFICANT CHANGE UP (ref 8.4–10.5)
CHLORIDE SERPL-SCNC: 101 MMOL/L — SIGNIFICANT CHANGE UP (ref 96–108)
CHLORIDE SERPL-SCNC: 101 MMOL/L — SIGNIFICANT CHANGE UP (ref 96–108)
CO2 SERPL-SCNC: 23 MMOL/L — SIGNIFICANT CHANGE UP (ref 22–31)
CO2 SERPL-SCNC: 23 MMOL/L — SIGNIFICANT CHANGE UP (ref 22–31)
COLLECTION METHOD: NORMAL
CREAT SERPL-MCNC: 0.52 MG/DL — SIGNIFICANT CHANGE UP (ref 0.5–1.3)
CREAT SERPL-MCNC: 0.52 MG/DL — SIGNIFICANT CHANGE UP (ref 0.5–1.3)
EGFR: 122 ML/MIN/1.73M2 — SIGNIFICANT CHANGE UP
EGFR: 122 ML/MIN/1.73M2 — SIGNIFICANT CHANGE UP
GLUCOSE SERPL-MCNC: 117 MG/DL — HIGH (ref 70–99)
GLUCOSE SERPL-MCNC: 117 MG/DL — HIGH (ref 70–99)
GLUCOSE UR-MCNC: NORMAL
HCG UR QL: 0.2 EU/DL
HGB UR QL STRIP.AUTO: NORMAL
KETONES UR-MCNC: NORMAL
LEUKOCYTE ESTERASE UR QL STRIP: NORMAL
NITRITE UR QL STRIP: NORMAL
PH UR STRIP: 8.5
POTASSIUM SERPL-MCNC: 4.8 MMOL/L — SIGNIFICANT CHANGE UP (ref 3.5–5.3)
POTASSIUM SERPL-MCNC: 4.8 MMOL/L — SIGNIFICANT CHANGE UP (ref 3.5–5.3)
POTASSIUM SERPL-SCNC: 4.8 MMOL/L — SIGNIFICANT CHANGE UP (ref 3.5–5.3)
POTASSIUM SERPL-SCNC: 4.8 MMOL/L — SIGNIFICANT CHANGE UP (ref 3.5–5.3)
PROT SERPL-MCNC: 7.5 G/DL — SIGNIFICANT CHANGE UP (ref 6–8.3)
PROT SERPL-MCNC: 7.5 G/DL — SIGNIFICANT CHANGE UP (ref 6–8.3)
PROT UR STRIP-MCNC: NORMAL
SODIUM SERPL-SCNC: 137 MMOL/L — SIGNIFICANT CHANGE UP (ref 135–145)
SODIUM SERPL-SCNC: 137 MMOL/L — SIGNIFICANT CHANGE UP (ref 135–145)
SP GR UR STRIP: 1.01

## 2023-12-01 PROCEDURE — G0108 DIAB MANAGE TRN  PER INDIV: CPT | Mod: NC

## 2023-12-01 PROCEDURE — 76801 OB US < 14 WKS SINGLE FETUS: CPT | Mod: 26,59

## 2023-12-01 PROCEDURE — 81509 FTL CGEN ABNOR 3 PROTEINS: CPT

## 2023-12-01 PROCEDURE — G0463: CPT

## 2023-12-01 PROCEDURE — 81002 URINALYSIS NONAUTO W/O SCOPE: CPT

## 2023-12-01 PROCEDURE — 84156 ASSAY OF PROTEIN URINE: CPT

## 2023-12-01 PROCEDURE — G0108: CPT

## 2023-12-01 PROCEDURE — 80053 COMPREHEN METABOLIC PANEL: CPT

## 2023-12-01 PROCEDURE — 76813 OB US NUCHAL MEAS 1 GEST: CPT | Mod: 26

## 2023-12-01 PROCEDURE — 36415 COLL VENOUS BLD VENIPUNCTURE: CPT

## 2023-12-01 PROCEDURE — 99213 OFFICE O/P EST LOW 20 MIN: CPT | Mod: GE,25

## 2023-12-01 RX ORDER — BLOOD-GLUCOSE METER
KIT MISCELLANEOUS
Qty: 2 | Refills: 0 | Status: ACTIVE | COMMUNITY
Start: 2023-12-01 | End: 1900-01-01

## 2023-12-02 DIAGNOSIS — O34.219 MATERNAL CARE FOR UNSPECIFIED TYPE SCAR FROM PREVIOUS CESAREAN DELIVERY: ICD-10-CM

## 2023-12-02 DIAGNOSIS — O09.529 SUPERVISION OF ELDERLY MULTIGRAVIDA, UNSPECIFIED TRIMESTER: ICD-10-CM

## 2023-12-02 DIAGNOSIS — R76.12 NONSPECIFIC REACTION TO CELL MEDIATED IMMUNITY MEASUREMENT OF GAMMA INTERFERON ANTIGEN RESPONSE WITHOUT ACTIVE TUBERCULOSIS: ICD-10-CM

## 2023-12-02 DIAGNOSIS — O24.112 PRE-EXISTING TYPE 2 DIABETES MELLITUS, IN PREGNANCY, SECOND TRIMESTER: ICD-10-CM

## 2023-12-02 DIAGNOSIS — O09.90 SUPERVISION OF HIGH RISK PREGNANCY, UNSPECIFIED, UNSPECIFIED TRIMESTER: ICD-10-CM

## 2023-12-02 LAB
BODY SURFACE AREA CALCULATION: 1.73 M2 — SIGNIFICANT CHANGE UP
BODY SURFACE AREA CALCULATION: 1.73 M2 — SIGNIFICANT CHANGE UP
COLLECT DURATION TIME UR: 24 HR — SIGNIFICANT CHANGE UP
CREAT CL ?TM UR+SERPL-VRATE: 112 ML/MIN — SIGNIFICANT CHANGE UP (ref 75–115)
CREAT CL ?TM UR+SERPL-VRATE: 112 ML/MIN — SIGNIFICANT CHANGE UP (ref 75–115)
PROT 24H UR-MRATE: 194 MG/24 H — HIGH (ref 50–100)
PROT 24H UR-MRATE: 194 MG/24 H — HIGH (ref 50–100)
TOTAL VOLUME - 24 HOUR: 2150 ML — SIGNIFICANT CHANGE UP
URINE CREATININE CALCULATION: 0.8 G/24 H — SIGNIFICANT CHANGE UP (ref 0.8–1.8)

## 2023-12-04 LAB
# FETUSES US: 1 — SIGNIFICANT CHANGE UP
# FETUSES US: 1 — SIGNIFICANT CHANGE UP
1ST TRIMESTER SCN+NT PATIENT-IMP: SIGNIFICANT CHANGE UP
1ST TRIMESTER SCN+NT PATIENT-IMP: SIGNIFICANT CHANGE UP
ADDITIONAL US: SIGNIFICANT CHANGE UP
ADDITIONAL US: SIGNIFICANT CHANGE UP
AGE AT DELIVERY: 39 YR — SIGNIFICANT CHANGE UP
AGE AT DELIVERY: 39 YR — SIGNIFICANT CHANGE UP
FET CRL US.MEAS: 61.2 MM — SIGNIFICANT CHANGE UP
FET CRL US.MEAS: 61.2 MM — SIGNIFICANT CHANGE UP
FET CRL US.MEAS: SIGNIFICANT CHANGE UP
FET CRL US.MEAS: SIGNIFICANT CHANGE UP
FET NUCHAL FOLD MOM THICKNESS US.MEAS: 1.2 — SIGNIFICANT CHANGE UP
FET NUCHAL FOLD MOM THICKNESS US.MEAS: 1.2 — SIGNIFICANT CHANGE UP
FET NUCHAL FOLD MOM THICKNESS US.MEAS: SIGNIFICANT CHANGE UP
FET NUCHAL FOLD MOM THICKNESS US.MEAS: SIGNIFICANT CHANGE UP
FET NUCHAL FOLD THICKNESS US.MEAS: 1.9 MM — SIGNIFICANT CHANGE UP
FET NUCHAL FOLD THICKNESS US.MEAS: 1.9 MM — SIGNIFICANT CHANGE UP
FET NUCHAL FOLD THICKNESS US.MEAS: SIGNIFICANT CHANGE UP
FET NUCHAL FOLD THICKNESS US.MEAS: SIGNIFICANT CHANGE UP
FET TS 18 RISK FROM MAT AGE: SIGNIFICANT CHANGE UP
FET TS 18 RISK FROM MAT AGE: SIGNIFICANT CHANGE UP
FET TS 21 RISK FROM MAT AGE: SIGNIFICANT CHANGE UP
FET TS 21 RISK FROM MAT AGE: SIGNIFICANT CHANGE UP
FIRST TRIMESTER SCREEN COMMENTS: SIGNIFICANT CHANGE UP
FIRST TRIMESTER SCREEN COMMENTS: SIGNIFICANT CHANGE UP
FIRST TRIMESTER SCREEN NOTE: SIGNIFICANT CHANGE UP
FIRST TRIMESTER SCREEN NOTE: SIGNIFICANT CHANGE UP
FIRST TRIMESTER SCREEN RESULTS: SIGNIFICANT CHANGE UP
FIRST TRIMESTER SCREEN RESULTS: SIGNIFICANT CHANGE UP
GA: 12.4 WEEKS — SIGNIFICANT CHANGE UP
GA: 12.4 WEEKS — SIGNIFICANT CHANGE UP
HCG ADJ MOM SERPL: 0.76 — SIGNIFICANT CHANGE UP
HCG ADJ MOM SERPL: 0.76 — SIGNIFICANT CHANGE UP
HCG SERPL-ACNC: 70.5 IU/ML — SIGNIFICANT CHANGE UP
HCG SERPL-ACNC: 70.5 IU/ML — SIGNIFICANT CHANGE UP
INHIBIN A ADJ MOM SERPL: 1.09 — SIGNIFICANT CHANGE UP
INHIBIN A ADJ MOM SERPL: 1.09 — SIGNIFICANT CHANGE UP
INHIBIN A SERPL-MCNC: 240.9 PG/ML — SIGNIFICANT CHANGE UP
INHIBIN A SERPL-MCNC: 240.9 PG/ML — SIGNIFICANT CHANGE UP
INTEGRATED SCN PATIENT-IMP: SIGNIFICANT CHANGE UP
PAPP-A ADJ MOM SERPL: 0.87 — SIGNIFICANT CHANGE UP
PAPP-A ADJ MOM SERPL: 0.87 — SIGNIFICANT CHANGE UP
PAPP-A SERPL-MCNC: 771.8 NG/ML — SIGNIFICANT CHANGE UP
PAPP-A SERPL-MCNC: 771.8 NG/ML — SIGNIFICANT CHANGE UP
RACE AFP: SIGNIFICANT CHANGE UP
RACE AFP: SIGNIFICANT CHANGE UP
SONOGRAPHER: SIGNIFICANT CHANGE UP
SONOGRAPHER: SIGNIFICANT CHANGE UP
TS 18 RISK FETUS: SIGNIFICANT CHANGE UP
TS 18 RISK FETUS: SIGNIFICANT CHANGE UP
TS 21 RISK FETUS: SIGNIFICANT CHANGE UP
TS 21 RISK FETUS: SIGNIFICANT CHANGE UP
US DATE: SIGNIFICANT CHANGE UP

## 2023-12-07 ENCOUNTER — NON-APPOINTMENT (OUTPATIENT)
Age: 38
End: 2023-12-07

## 2023-12-08 ENCOUNTER — APPOINTMENT (OUTPATIENT)
Dept: MATERNAL FETAL MEDICINE | Facility: CLINIC | Age: 38
End: 2023-12-08

## 2023-12-08 ENCOUNTER — NON-APPOINTMENT (OUTPATIENT)
Age: 38
End: 2023-12-08

## 2023-12-11 ENCOUNTER — NON-APPOINTMENT (OUTPATIENT)
Age: 38
End: 2023-12-11

## 2023-12-14 ENCOUNTER — APPOINTMENT (OUTPATIENT)
Dept: MATERNAL FETAL MEDICINE | Facility: CLINIC | Age: 38
End: 2023-12-14
Payer: MEDICAID

## 2023-12-14 PROCEDURE — G0108 DIAB MANAGE TRN  PER INDIV: CPT | Mod: 95

## 2023-12-15 ENCOUNTER — APPOINTMENT (OUTPATIENT)
Dept: MATERNAL FETAL MEDICINE | Facility: CLINIC | Age: 38
End: 2023-12-15

## 2023-12-16 ENCOUNTER — NON-APPOINTMENT (OUTPATIENT)
Age: 38
End: 2023-12-16

## 2023-12-16 ENCOUNTER — EMERGENCY (EMERGENCY)
Facility: HOSPITAL | Age: 38
LOS: 1 days | Discharge: HOME | End: 2023-12-16
Attending: EMERGENCY MEDICINE | Admitting: EMERGENCY MEDICINE
Payer: MEDICAID

## 2023-12-16 VITALS
HEIGHT: 61 IN | DIASTOLIC BLOOD PRESSURE: 54 MMHG | TEMPERATURE: 99 F | WEIGHT: 160.72 LBS | SYSTOLIC BLOOD PRESSURE: 109 MMHG | HEART RATE: 96 BPM | RESPIRATION RATE: 16 BRPM | OXYGEN SATURATION: 99 %

## 2023-12-16 LAB
ACETONE SERPL-MCNC: NEGATIVE — SIGNIFICANT CHANGE UP
ACETONE SERPL-MCNC: NEGATIVE — SIGNIFICANT CHANGE UP
ANION GAP SERPL CALC-SCNC: 10 MMOL/L — SIGNIFICANT CHANGE UP (ref 5–17)
ANION GAP SERPL CALC-SCNC: 10 MMOL/L — SIGNIFICANT CHANGE UP (ref 5–17)
BASE EXCESS BLDV CALC-SCNC: 0.1 MMOL/L — SIGNIFICANT CHANGE UP (ref -2–3)
BASE EXCESS BLDV CALC-SCNC: 0.1 MMOL/L — SIGNIFICANT CHANGE UP (ref -2–3)
BASOPHILS # BLD AUTO: 0.01 K/UL — SIGNIFICANT CHANGE UP (ref 0–0.2)
BASOPHILS # BLD AUTO: 0.01 K/UL — SIGNIFICANT CHANGE UP (ref 0–0.2)
BASOPHILS NFR BLD AUTO: 0.1 % — SIGNIFICANT CHANGE UP (ref 0–2)
BASOPHILS NFR BLD AUTO: 0.1 % — SIGNIFICANT CHANGE UP (ref 0–2)
BUN SERPL-MCNC: 3 MG/DL — LOW (ref 7–23)
BUN SERPL-MCNC: 3 MG/DL — LOW (ref 7–23)
CALCIUM SERPL-MCNC: 9 MG/DL — SIGNIFICANT CHANGE UP (ref 8.4–10.5)
CALCIUM SERPL-MCNC: 9 MG/DL — SIGNIFICANT CHANGE UP (ref 8.4–10.5)
CHLORIDE SERPL-SCNC: 103 MMOL/L — SIGNIFICANT CHANGE UP (ref 96–108)
CHLORIDE SERPL-SCNC: 103 MMOL/L — SIGNIFICANT CHANGE UP (ref 96–108)
CO2 BLDV-SCNC: 26 MMOL/L — SIGNIFICANT CHANGE UP (ref 22–26)
CO2 BLDV-SCNC: 26 MMOL/L — SIGNIFICANT CHANGE UP (ref 22–26)
CO2 SERPL-SCNC: 25 MMOL/L — SIGNIFICANT CHANGE UP (ref 22–31)
CO2 SERPL-SCNC: 25 MMOL/L — SIGNIFICANT CHANGE UP (ref 22–31)
CREAT SERPL-MCNC: 0.5 MG/DL — SIGNIFICANT CHANGE UP (ref 0.5–1.3)
CREAT SERPL-MCNC: 0.5 MG/DL — SIGNIFICANT CHANGE UP (ref 0.5–1.3)
EGFR: 123 ML/MIN/1.73M2 — SIGNIFICANT CHANGE UP
EGFR: 123 ML/MIN/1.73M2 — SIGNIFICANT CHANGE UP
EOSINOPHIL # BLD AUTO: 0.05 K/UL — SIGNIFICANT CHANGE UP (ref 0–0.5)
EOSINOPHIL # BLD AUTO: 0.05 K/UL — SIGNIFICANT CHANGE UP (ref 0–0.5)
EOSINOPHIL NFR BLD AUTO: 0.7 % — SIGNIFICANT CHANGE UP (ref 0–6)
EOSINOPHIL NFR BLD AUTO: 0.7 % — SIGNIFICANT CHANGE UP (ref 0–6)
GAS PNL BLDV: SIGNIFICANT CHANGE UP
GAS PNL BLDV: SIGNIFICANT CHANGE UP
GLUCOSE BLDC GLUCOMTR-MCNC: 164 MG/DL — HIGH (ref 70–99)
GLUCOSE BLDC GLUCOMTR-MCNC: 164 MG/DL — HIGH (ref 70–99)
GLUCOSE SERPL-MCNC: 151 MG/DL — HIGH (ref 70–99)
GLUCOSE SERPL-MCNC: 151 MG/DL — HIGH (ref 70–99)
HCO3 BLDV-SCNC: 25 MMOL/L — SIGNIFICANT CHANGE UP (ref 22–29)
HCO3 BLDV-SCNC: 25 MMOL/L — SIGNIFICANT CHANGE UP (ref 22–29)
HCT VFR BLD CALC: 34.4 % — LOW (ref 34.5–45)
HCT VFR BLD CALC: 34.4 % — LOW (ref 34.5–45)
HGB BLD-MCNC: 11.9 G/DL — SIGNIFICANT CHANGE UP (ref 11.5–15.5)
HGB BLD-MCNC: 11.9 G/DL — SIGNIFICANT CHANGE UP (ref 11.5–15.5)
IMM GRANULOCYTES NFR BLD AUTO: 0.3 % — SIGNIFICANT CHANGE UP (ref 0–0.9)
IMM GRANULOCYTES NFR BLD AUTO: 0.3 % — SIGNIFICANT CHANGE UP (ref 0–0.9)
LYMPHOCYTES # BLD AUTO: 1.13 K/UL — SIGNIFICANT CHANGE UP (ref 1–3.3)
LYMPHOCYTES # BLD AUTO: 1.13 K/UL — SIGNIFICANT CHANGE UP (ref 1–3.3)
LYMPHOCYTES # BLD AUTO: 16.9 % — SIGNIFICANT CHANGE UP (ref 13–44)
LYMPHOCYTES # BLD AUTO: 16.9 % — SIGNIFICANT CHANGE UP (ref 13–44)
MCHC RBC-ENTMCNC: 29.1 PG — SIGNIFICANT CHANGE UP (ref 27–34)
MCHC RBC-ENTMCNC: 29.1 PG — SIGNIFICANT CHANGE UP (ref 27–34)
MCHC RBC-ENTMCNC: 34.6 GM/DL — SIGNIFICANT CHANGE UP (ref 32–36)
MCHC RBC-ENTMCNC: 34.6 GM/DL — SIGNIFICANT CHANGE UP (ref 32–36)
MCV RBC AUTO: 84.1 FL — SIGNIFICANT CHANGE UP (ref 80–100)
MCV RBC AUTO: 84.1 FL — SIGNIFICANT CHANGE UP (ref 80–100)
MONOCYTES # BLD AUTO: 0.42 K/UL — SIGNIFICANT CHANGE UP (ref 0–0.9)
MONOCYTES # BLD AUTO: 0.42 K/UL — SIGNIFICANT CHANGE UP (ref 0–0.9)
MONOCYTES NFR BLD AUTO: 6.3 % — SIGNIFICANT CHANGE UP (ref 2–14)
MONOCYTES NFR BLD AUTO: 6.3 % — SIGNIFICANT CHANGE UP (ref 2–14)
NEUTROPHILS # BLD AUTO: 5.05 K/UL — SIGNIFICANT CHANGE UP (ref 1.8–7.4)
NEUTROPHILS # BLD AUTO: 5.05 K/UL — SIGNIFICANT CHANGE UP (ref 1.8–7.4)
NEUTROPHILS NFR BLD AUTO: 75.7 % — SIGNIFICANT CHANGE UP (ref 43–77)
NEUTROPHILS NFR BLD AUTO: 75.7 % — SIGNIFICANT CHANGE UP (ref 43–77)
NRBC # BLD: 0 /100 WBCS — SIGNIFICANT CHANGE UP (ref 0–0)
NRBC # BLD: 0 /100 WBCS — SIGNIFICANT CHANGE UP (ref 0–0)
PCO2 BLDV: 39 MMHG — SIGNIFICANT CHANGE UP (ref 39–42)
PCO2 BLDV: 39 MMHG — SIGNIFICANT CHANGE UP (ref 39–42)
PH BLDV: 7.41 — SIGNIFICANT CHANGE UP (ref 7.32–7.43)
PH BLDV: 7.41 — SIGNIFICANT CHANGE UP (ref 7.32–7.43)
PLATELET # BLD AUTO: 230 K/UL — SIGNIFICANT CHANGE UP (ref 150–400)
PLATELET # BLD AUTO: 230 K/UL — SIGNIFICANT CHANGE UP (ref 150–400)
PO2 BLDV: 44 MMHG — SIGNIFICANT CHANGE UP (ref 25–45)
PO2 BLDV: 44 MMHG — SIGNIFICANT CHANGE UP (ref 25–45)
POTASSIUM SERPL-MCNC: 4.1 MMOL/L — SIGNIFICANT CHANGE UP (ref 3.5–5.3)
POTASSIUM SERPL-MCNC: 4.1 MMOL/L — SIGNIFICANT CHANGE UP (ref 3.5–5.3)
POTASSIUM SERPL-SCNC: 4.1 MMOL/L — SIGNIFICANT CHANGE UP (ref 3.5–5.3)
POTASSIUM SERPL-SCNC: 4.1 MMOL/L — SIGNIFICANT CHANGE UP (ref 3.5–5.3)
RBC # BLD: 4.09 M/UL — SIGNIFICANT CHANGE UP (ref 3.8–5.2)
RBC # BLD: 4.09 M/UL — SIGNIFICANT CHANGE UP (ref 3.8–5.2)
RBC # FLD: 12.5 % — SIGNIFICANT CHANGE UP (ref 10.3–14.5)
RBC # FLD: 12.5 % — SIGNIFICANT CHANGE UP (ref 10.3–14.5)
SAO2 % BLDV: 75.9 % — SIGNIFICANT CHANGE UP (ref 67–88)
SAO2 % BLDV: 75.9 % — SIGNIFICANT CHANGE UP (ref 67–88)
SODIUM SERPL-SCNC: 138 MMOL/L — SIGNIFICANT CHANGE UP (ref 135–145)
SODIUM SERPL-SCNC: 138 MMOL/L — SIGNIFICANT CHANGE UP (ref 135–145)
WBC # BLD: 6.68 K/UL — SIGNIFICANT CHANGE UP (ref 3.8–10.5)
WBC # BLD: 6.68 K/UL — SIGNIFICANT CHANGE UP (ref 3.8–10.5)
WBC # FLD AUTO: 6.68 K/UL — SIGNIFICANT CHANGE UP (ref 3.8–10.5)
WBC # FLD AUTO: 6.68 K/UL — SIGNIFICANT CHANGE UP (ref 3.8–10.5)

## 2023-12-16 PROCEDURE — 99284 EMERGENCY DEPT VISIT MOD MDM: CPT

## 2023-12-16 RX ORDER — SODIUM CHLORIDE 9 MG/ML
1000 INJECTION INTRAMUSCULAR; INTRAVENOUS; SUBCUTANEOUS ONCE
Refills: 0 | Status: COMPLETED | OUTPATIENT
Start: 2023-12-16 | End: 2023-12-16

## 2023-12-16 RX ORDER — ONDANSETRON 8 MG/1
4 TABLET, FILM COATED ORAL ONCE
Refills: 0 | Status: COMPLETED | OUTPATIENT
Start: 2023-12-16 | End: 2023-12-16

## 2023-12-16 RX ADMIN — ONDANSETRON 4 MILLIGRAM(S): 8 TABLET, FILM COATED ORAL at 23:39

## 2023-12-16 NOTE — ED PROVIDER NOTE - OBJECTIVE STATEMENT
37 y/o F G3 P@ about 3 months preg, presents to Ed c/o sudden onset left lower quad pain started at 3 PM and progressively worsened, now pain radiates to left flank area , no vaginal bleeding. 39 y/o F G3 P@ about 3 months preg, presents to Ed c/o sudden onset left lower quad pain started at 3 PM and progressively worsened, now pain radiates to left flank area , no vaginal bleeding.

## 2023-12-16 NOTE — ED PROVIDER NOTE - CLINICAL SUMMARY MEDICAL DECISION MAKING FREE TEXT BOX
Subjective:       Patient ID: Sam Flores is a 55 y.o. male.    Chief Complaint: Annual Exam and Establish Care    HPI Pt. Here for f/u for DM and HTN; he is compliant with meds; Pt. Sister is with the pt.; he will come back fasting for labs; he does not check BS; he would like colonoscopy; eye exam was 3 weeks ago; he would like a flu and pneumonia shot; he sees ortho and pain management for intermitent R knee pain; he takes naproxen prn which helps and had localized shot by ortho recently; currently R knee is not bothersome; weight is elevated   Review of Systems   Constitutional: Negative for chills, fatigue and fever.   HENT: Negative for congestion, rhinorrhea and sore throat.    Respiratory: Negative for cough, shortness of breath and wheezing.    Cardiovascular: Negative for chest pain.   Gastrointestinal: Negative for abdominal pain, nausea and vomiting.   Genitourinary: Negative for dysuria.   Musculoskeletal: Positive for arthralgias.        Intermitent R knee pain which is helped with NSAID prn; altered gait   Skin: Negative for rash.   Neurological: Negative for dizziness and headaches.        Impaired gait after recovering from come decades ago   Psychiatric/Behavioral: Negative for sleep disturbance. The patient is not nervous/anxious.        Objective:      Physical Exam   Constitutional: He is oriented to person, place, and time.   overweight   Eyes: EOM are normal.   Neck: Normal range of motion.   Cardiovascular: Normal rate, regular rhythm and normal heart sounds.    Pulmonary/Chest: Effort normal and breath sounds normal.   Abdominal: Soft. There is no tenderness. There is no rebound and no guarding.   Musculoskeletal: Normal range of motion.   Neurological: He is alert and oriented to person, place, and time.   Skin: No rash noted.       Assessment:       1. Type 2 diabetes mellitus without complication, without long-term current use of insulin Active   2. Hypertension associated with  diabetes Well controlled   3. Hyperlipidemia, unspecified hyperlipidemia type Active   4. Arthralgia of right knee Active   5. Colon cancer screening Active   6. Need for prophylactic vaccination with Streptococcus pneumoniae (Pneumococcus) and Influenza vaccines Active   7. Preventative health care Active   8. Prostate cancer screening Active   9. Overweight (BMI 25.0-29.9) Sub-optimally controlled       Plan:         Type 2 diabetes mellitus without complication, without long-term current use of insulin  Comments:  continue current regimen and encouraged ADA diet; get HGBA1C and start asa QD  Orders:  -     CBC auto differential; Future; Expected date: 1/27/17  -     Comprehensive metabolic panel; Future; Expected date: 1/27/17  -     Hemoglobin A1c; Future; Expected date: 1/27/17  -     Urinalysis  -     Microalbumin/creatinine urine ratio    Hypertension associated with diabetes  Comments:  continue current regimen and encouraged low Na diet and weight loss  Orders:  -     CBC auto differential; Future; Expected date: 1/27/17  -     Comprehensive metabolic panel; Future; Expected date: 1/27/17  -     Urinalysis    Hyperlipidemia, unspecified hyperlipidemia type  Comments:  continue current regimen and encouraged diet modification; get lipids  Orders:  -     Lipid panel; Future; Expected date: 1/27/17    Arthralgia of right knee  Comments:  NSAID prn    Colon cancer screening  -     Case request GI: COLONOSCOPY    Need for prophylactic vaccination with Streptococcus pneumoniae (Pneumococcus) and Influenza vaccines  -     Influenza - Quadrivalent (3 years & older)  -     Pneumococcal Conjugate Vaccine (13 Valent) (IM)    Preventative health care  -     CBC auto differential; Future; Expected date: 1/27/17  -     Comprehensive metabolic panel; Future; Expected date: 1/27/17  -     Lipid panel; Future; Expected date: 1/27/17  -     Urinalysis    Prostate cancer screening  -     PSA, Screening; Future; Expected date:  1/27/17    Overweight (BMI 25.0-29.9)  Comments:  encouraged diet     Other orders  -     aspirin (ECOTRIN) 81 MG EC tablet; Take 1 tablet (81 mg total) by mouth once daily.; Refill: 0       37 y/o F G3 P@ about 3 months preg, presents to Ed c/o sudden onset left lower quad pain started at 3 PM and progressively worsened, now pain radiates to left flank area , no vaginal bleeding. pt has mod LLQ tenderness, us SHOWED siup, LIKELY  round ligament pain

## 2023-12-16 NOTE — ED PROVIDER NOTE - PATIENT PORTAL LINK FT
You can access the FollowMyHealth Patient Portal offered by [facility] by registering at the following website: [email or web address]. By joining [employer]’s FollowMyHealth portal, you will also be able to view your health information using other applications (apps) compatible with our system. You can access the FollowMyHealth Patient Portal offered by [facility] by registering at the following website: [email or web address]. By joining [employer]’s FollowMyHealth portal, you will also be able to view your health information using other applications (apps) compatible with our system.

## 2023-12-16 NOTE — ED ADULT TRIAGE NOTE - CHIEF COMPLAINT QUOTE
Pt is 3 months pregnant, LMP 8/18/23, third pregnancy.  Pt is nauseous and vomiting "a lot" over past 2 days.  Pt has pain mid abd radiating to back, headache.  PCP Olaf.

## 2023-12-16 NOTE — ED PROVIDER NOTE - IV ALTEPLASE EXCL REL HIDDEN
Detail Level: Zone Patient Specific Counseling (Will Not Stick From Patient To Patient): Discussed Triluma, lasers and cryotherapy in detail with pt. Apply Triluma to spots as directed. Detail Level: Detailed show

## 2023-12-16 NOTE — ED PROVIDER NOTE - NSFOLLOWUPINSTRUCTIONS_ED_ALL_ED_FT
Pelvic pain pregnancy  likely round ligament pain  take tylenol for pain  follow up with FP clinic in 2 days  return to ER if any problem

## 2023-12-17 VITALS
SYSTOLIC BLOOD PRESSURE: 110 MMHG | HEART RATE: 87 BPM | DIASTOLIC BLOOD PRESSURE: 62 MMHG | OXYGEN SATURATION: 98 % | RESPIRATION RATE: 18 BRPM

## 2023-12-17 LAB
APPEARANCE UR: CLEAR — SIGNIFICANT CHANGE UP
APPEARANCE UR: CLEAR — SIGNIFICANT CHANGE UP
BACTERIA # UR AUTO: NEGATIVE /HPF — SIGNIFICANT CHANGE UP
BACTERIA # UR AUTO: NEGATIVE /HPF — SIGNIFICANT CHANGE UP
BILIRUB UR-MCNC: NEGATIVE — SIGNIFICANT CHANGE UP
BILIRUB UR-MCNC: NEGATIVE — SIGNIFICANT CHANGE UP
BLD GP AB SCN SERPL QL: SIGNIFICANT CHANGE UP
BLD GP AB SCN SERPL QL: SIGNIFICANT CHANGE UP
COLOR SPEC: YELLOW — SIGNIFICANT CHANGE UP
COLOR SPEC: YELLOW — SIGNIFICANT CHANGE UP
DIFF PNL FLD: NEGATIVE — SIGNIFICANT CHANGE UP
DIFF PNL FLD: NEGATIVE — SIGNIFICANT CHANGE UP
EPI CELLS # UR: 5 — SIGNIFICANT CHANGE UP
EPI CELLS # UR: 5 — SIGNIFICANT CHANGE UP
GLUCOSE UR QL: NEGATIVE MG/DL — SIGNIFICANT CHANGE UP
GLUCOSE UR QL: NEGATIVE MG/DL — SIGNIFICANT CHANGE UP
HCG SERPL-ACNC: HIGH MIU/ML
HCG SERPL-ACNC: HIGH MIU/ML
KETONES UR-MCNC: NEGATIVE MG/DL — SIGNIFICANT CHANGE UP
KETONES UR-MCNC: NEGATIVE MG/DL — SIGNIFICANT CHANGE UP
LEUKOCYTE ESTERASE UR-ACNC: ABNORMAL
LEUKOCYTE ESTERASE UR-ACNC: ABNORMAL
NITRITE UR-MCNC: NEGATIVE — SIGNIFICANT CHANGE UP
NITRITE UR-MCNC: NEGATIVE — SIGNIFICANT CHANGE UP
PH UR: 7.5 — SIGNIFICANT CHANGE UP (ref 5–8)
PH UR: 7.5 — SIGNIFICANT CHANGE UP (ref 5–8)
PROT UR-MCNC: NEGATIVE MG/DL — SIGNIFICANT CHANGE UP
PROT UR-MCNC: NEGATIVE MG/DL — SIGNIFICANT CHANGE UP
RBC CASTS # UR COMP ASSIST: 0 /HPF — SIGNIFICANT CHANGE UP (ref 0–4)
RBC CASTS # UR COMP ASSIST: 0 /HPF — SIGNIFICANT CHANGE UP (ref 0–4)
SP GR SPEC: 1.01 — SIGNIFICANT CHANGE UP (ref 1–1.03)
SP GR SPEC: 1.01 — SIGNIFICANT CHANGE UP (ref 1–1.03)
UROBILINOGEN FLD QL: 1 MG/DL — SIGNIFICANT CHANGE UP (ref 0.2–1)
UROBILINOGEN FLD QL: 1 MG/DL — SIGNIFICANT CHANGE UP (ref 0.2–1)
WBC UR QL: 3 /HPF — SIGNIFICANT CHANGE UP (ref 0–5)
WBC UR QL: 3 /HPF — SIGNIFICANT CHANGE UP (ref 0–5)

## 2023-12-17 PROCEDURE — 85025 COMPLETE CBC W/AUTO DIFF WBC: CPT

## 2023-12-17 PROCEDURE — 99284 EMERGENCY DEPT VISIT MOD MDM: CPT | Mod: 25

## 2023-12-17 PROCEDURE — 86900 BLOOD TYPING SEROLOGIC ABO: CPT

## 2023-12-17 PROCEDURE — 86850 RBC ANTIBODY SCREEN: CPT

## 2023-12-17 PROCEDURE — 86901 BLOOD TYPING SEROLOGIC RH(D): CPT

## 2023-12-17 PROCEDURE — 82962 GLUCOSE BLOOD TEST: CPT

## 2023-12-17 PROCEDURE — 80048 BASIC METABOLIC PNL TOTAL CA: CPT

## 2023-12-17 PROCEDURE — 96374 THER/PROPH/DIAG INJ IV PUSH: CPT

## 2023-12-17 PROCEDURE — 81001 URINALYSIS AUTO W/SCOPE: CPT

## 2023-12-17 PROCEDURE — 82803 BLOOD GASES ANY COMBINATION: CPT

## 2023-12-17 PROCEDURE — 76817 TRANSVAGINAL US OBSTETRIC: CPT

## 2023-12-17 PROCEDURE — 36415 COLL VENOUS BLD VENIPUNCTURE: CPT

## 2023-12-17 PROCEDURE — 76817 TRANSVAGINAL US OBSTETRIC: CPT | Mod: 26

## 2023-12-17 PROCEDURE — 82009 KETONE BODYS QUAL: CPT

## 2023-12-17 PROCEDURE — 84702 CHORIONIC GONADOTROPIN TEST: CPT

## 2023-12-17 RX ORDER — ACETAMINOPHEN 500 MG
650 TABLET ORAL ONCE
Refills: 0 | Status: COMPLETED | OUTPATIENT
Start: 2023-12-17 | End: 2023-12-17

## 2023-12-17 RX ADMIN — SODIUM CHLORIDE 2000 MILLILITER(S): 9 INJECTION INTRAMUSCULAR; INTRAVENOUS; SUBCUTANEOUS at 01:43

## 2023-12-17 RX ADMIN — Medication 650 MILLIGRAM(S): at 01:52

## 2023-12-17 NOTE — ED ADULT NURSE NOTE - OBJECTIVE STATEMENT
Pt axo3, 3 months pregnant, LMP 8/18/23, third pregnancy.  Pt is nauseous and vomiting "a lot" over past 2 days.  Pt has pain mid abd radiating to back, headache.  PCP Olaf. denies chest pain or sob. safety maintained.

## 2023-12-17 NOTE — ED ADULT NURSE NOTE - NSFALLUNIVINTERV_ED_ALL_ED
Bed/Stretcher in lowest position, wheels locked, appropriate side rails in place/Call bell, personal items and telephone in reach/Instruct patient to call for assistance before getting out of bed/chair/stretcher/Non-slip footwear applied when patient is off stretcher/[place] to call system/Physically safe environment - no spills, clutter or unnecessary equipment/Purposeful proactive rounding/Room/bathroom lighting operational, light cord in reach Bed/Stretcher in lowest position, wheels locked, appropriate side rails in place/Call bell, personal items and telephone in reach/Instruct patient to call for assistance before getting out of bed/chair/stretcher/Non-slip footwear applied when patient is off stretcher/[place] to call system/Physically safe environment - no spills, clutter or unnecessary equipment/Purposeful proactive rounding/Room/bathroom lighting operational, light cord in reach

## 2023-12-17 NOTE — ED ADULT NURSE NOTE - CCCP TRG CHIEF CMPLNT
[facility]  [address]  [place] [address]  Phone: [phone]  Fax: [phone]  Primary Provider: Reinier Ritchie  Pre-op Performing Provider: DEANN COOK    PREOPERATIVE EVALUATION:  Today's date: 12/4/2020    Gregg Aguayo is a 57 year old male who presents for a preoperative evaluation.    Surgical Information:  Surgery/Procedure: right knee scope  Surgery Location: [facility]  Surgeon: Dr. Grissom  Surgery Date: 12/11/20  Time of Surgery:   Where patient plans to recover: At home with family  Fax number for surgical facility: [facility]    Type of Anesthesia Anticipated: General    Subjective     HPI related to upcoming procedure:     Patient has upcoming right knee arthroscopy for chronic right knee pain.  He does have a diagnosis of polymyalgia rheumatica.  Whenever he is taking prednisone he will take Metformin to prevent diabetes.  He states he has not needed the prednisone for quite some time.  Overall he is feeling well.  He did have COVID-19, tested positive on 1123.  Symptoms were body aches and loss of taste and smell.  He reports he is 11 days out and has been cleared by department of health.  He no longer is having any symptoms at this time.  He does have a repeat Covid test scheduled for 12/7/2020 for upcoming surgery.    Health Care Directive:  Patient does not have a Health Care Directive or Living Will: Discussed advance care planning with patient; information given to patient to review.    Preoperative Review of :  [place]  reviewed.  No concerning findings noted.      Review of Systems  Complete review of systems was obtained.  All pertinent positives and negatives are noted above.    Patient Active Problem List    Diagnosis Date Noted     Morbid obesity (H) 12/04/2020     Priority: Medium     Crepitus of joint of right knee 09/11/2020     Priority: Medium     S/P lumbar discectomy 05/18/2020     Priority: Medium     Lumbar pain with radiation  down right leg 04/10/2020     Priority: Medium     Lumbar foraminal stenosis 04/08/2020     Priority: Medium     Symptomatic premature ventricular contractions 04/15/2019     Priority: Medium     Heartburn 07/25/2018     Priority: Medium     Steroid-induced hyperglycemia 04/10/2018     Priority: Medium     Psychosexual dysfunction with inhibited sexual excitement 01/29/2018     Priority: Medium     Tobacco abuse 01/29/2018     Priority: Medium     Overview:   IMO Update 10/11       AILEEN on CPAP - uses nightly, finds very helpful 11/20/2017     Priority: Medium     Bilateral leg edema 07/05/2017     Priority: Medium     Polymyalgia rheumatica (H) 11/11/2016     Priority: Medium     Arthralgia of multiple joints 10/26/2016     Priority: Medium     Hypertrophic and atrophic condition of skin 06/07/2012     Priority: Medium      Past Medical History:   Diagnosis Date     Cigarette smoker      Erectile dysfunction      GERD (gastroesophageal reflux disease)      Obesity      AILEEN on CPAP      Peripheral edema      PMR (polymyalgia rheumatica) (H)      Past Surgical History:   Procedure Laterality Date     APPENDECTOMY OPEN  2005     ARTHROPLASTY KNEE Left      ARTHROSCOPY KNEE Left 3/19/2018    Procedure: ARTHROSCOPY KNEE;  Left Knee Arthroscopy, Partial Meniscectomy, Chondroplasty;  Surgeon: Kwabena Patricia DO;  Location: [facility] OR     ATTEMPTED ARTHROSCOPY      knee ? left     COLONOSCOPY  01/29/2014 1/29/2014,Tubular adenoma of rectum - follow up 5 years     COLONOSCOPY  02/01/2019    normal with diverticula, follow up 10 years, 2/1/29     COLONOSCOPY N/A 2/1/2019    normal, follow up 2/1/29     JOINT REPLACEMENT Left 11/2018     RELEASE CARPAL TUNNEL Right 11/22/2017     RELEASE CARPAL TUNNEL Left     12/21/2017,[id number],OTHER,Left     RELEASE TRIGGER FINGER Right 9/7/2018     RELEASE ULNAR NERVE (ELBOW) Left 12/21/2017     Current Outpatient Medications   Medication Sig Dispense Refill     acetaminophen (TYLENOL) 650 MG CR  tablet Take up to 4 tablet daily for pain as needed       furosemide (LASIX) 20 MG tablet Take 2 tablets (40 mg) by mouth daily -- Dose Increase 9/11/2020 180 tablet 3     hydroxychloroquine (PLAQUENIL) 200 MG tablet Take 400 mg by mouth       metoprolol succinate ER (TOPROL-XL) 25 MG 24 hr tablet Take 2 tablets (50 mg) by mouth every evening -- adjust dose as needed for heart palpitations 180 tablet 3     omeprazole (PRILOSEC) 40 MG DR capsule TAKE 1 CAPSULE (40 MG) BY MOUTH DAILY 90 capsule 1     traMADol (ULTRAM) 50 MG tablet TAKE 1 TABLET BY MOUTH TWICE DAILY AS NEEDED FOR PAIN--NEED TO FILLEVERY 28 DAYS IF USING REGULARLY. 56 tablet 5     gabapentin (NEURONTIN) 100 MG capsule Take 1-2 capsules (100-200 mg) by mouth 4 times daily as needed For nerve pain (Patient not taking: Reported on 12/4/2020) 120 capsule 3     metFORMIN (GLUCOPHAGE) 500 MG tablet Take 1-2 tablets (500-1,000 mg) by mouth daily (with dinner) While taking oral steroids (Patient not taking: Reported on 12/4/2020) 180 tablet 3     methylPREDNISolone (MEDROL) 4 MG tablet TAKE 1/2-2 TABLETS BY MOUTH DAILY WITH A MEAL-WEAN DOSE EVERY 2-4 WEEKS FOR PMR (Patient not taking: Reported on 12/4/2020) 100 tablet 1     potassium chloride ER (K-TAB/KLOR-CON) 10 MEQ CR tablet Take 2 tablets (20 mEq) by mouth daily - with food (take with Furosemide) (Patient not taking: Reported on 12/4/2020) 180 tablet 3     sildenafil (REVATIO) 20 MG tablet Take 1-5 tablets ( mg) by mouth daily as needed (For Erectile Dysfunction - take 30 min to 1 hour prior to [place]) (Patient not taking: Reported on 12/4/2020) 30 tablet 3     sildenafil (VIAGRA) 100 MG tablet Take 1/4 to 1 tablet 30 min to 4 hours prior to sex for ED (Patient not taking: Reported on 12/4/2020) 30 tablet 11       No Known Allergies     Social History     Tobacco Use     Smoking status: Current Every Day Smoker     Packs/day: 0.25     Types: Cigarettes     Smokeless tobacco: Never Used      "Tobacco comment: Has Chantix but has not started   Substance Use Topics     Alcohol use: Yes     Alcohol/week: 0.0 standard drinks     Frequency: 2-4 times a month     Family History   Problem Relation Age of Onset     Genetic Disorder Maternal Grandfather         Genetic,Polymyalgia Rheumatica     Heart Murmur Mother      Polymyalgia rheumatica Mother      History   Drug Use No         Objective     /86 (BP Location: Right arm, Patient Position: Sitting, Cuff Size: Adult Regular)   Pulse 52   Temp 95.7  F (35.4  C) (Temporal)   Resp 20   Ht 1.803 m (5' 11\")   Wt 137.7 kg (303 lb 9.6 oz)   SpO2 98%   BMI 42.34 kg/m      Physical Exam    GENERAL APPEARANCE: healthy, alert and no distress     EYES: EOMI,  PERRL     HENT: ear canals and TM's normal and nose and mouth without ulcers or lesions     NECK: no adenopathy, no asymmetry, masses, or scars and thyroid normal to palpation     RESP: lungs clear to auscultation - no rales, rhonchi or wheezes     CV: regular rates and rhythm, normal S1 S2, no S3 or S4 and no murmur, click or rub     ABDOMEN:  soft, nontender, no HSM or masses and bowel sounds normal     MS: extremities normal- no gross deformities noted, no evidence of inflammation in joints, FROM in all extremities.     SKIN: no suspicious lesions or rashes     NEURO: Normal strength and tone, sensory exam grossly normal, mentation intact and speech normal     PSYCH: mentation appears normal. and affect normal/bright     LYMPHATICS: No cervical adenopathy    Recent Labs   Lab Test 04/10/20  1033   HGB 15.9         POTASSIUM 4.2   CR 0.90        Diagnostics:  Results for orders placed or performed in visit on 12/04/20   Basic Metabolic Panel     Status: None   Result Value Ref Range    Sodium 139 134 - 144 mmol/L    Potassium 4.5 3.5 - 5.1 mmol/L    Chloride 105 98 - 107 mmol/L    Carbon Dioxide 27 21 - 31 mmol/L    Anion Gap 7 3 - 14 mmol/L    Glucose 105 70 - 105 mg/dL    Urea Nitrogen " 16 7 - 25 mg/dL    Creatinine 0.91 0.70 - 1.30 mg/dL    GFR Estimate 86 >60 mL/min/[1.73_m2]    GFR Estimate If Black >90 >60 mL/min/[1.73_m2]    Calcium 9.4 8.6 - 10.3 mg/dL   CBC and Differential     Status: None   Result Value Ref Range    WBC 6.3 4.0 - 11.0 10e9/L    RBC Count 4.93 4.4 - 5.9 10e12/L    Hemoglobin 15.8 13.3 - 17.7 g/dL    Hematocrit 45.6 40.0 - 53.0 %    MCV 93 78 - 100 fl    MCH 32.0 26.5 - 33.0 pg    MCHC 34.6 31.5 - 36.5 g/dL    RDW 12.3 10.0 - 15.0 %    Platelet Count 210 150 - 450 10e9/L    Diff Method Automated Method     % Neutrophils 58.1 %    % Lymphocytes 26.2 %    % Monocytes 9.8 %    % Eosinophils 4.1 %    % Basophils 1.3 %    % Immature Granulocytes 0.5 %    Absolute Neutrophil 3.7 1.6 - 8.3 10e9/L    Absolute Lymphocytes 1.7 0.8 - 5.3 10e9/L    Absolute Monocytes 0.6 0.0 - 1.3 10e9/L    Absolute Eosinophils 0.3 0.0 - 0.7 10e9/L    Absolute Basophils 0.1 0.0 - 0.2 10e9/L    Abs Immature Granulocytes 0.0 0 - 0.4 10e9/L        No EKG this visit, completed in the last 90 days.    Revised Cardiac Risk Index (RCRI):  The patient has the following serious cardiovascular risks for perioperative complications:   - No serious cardiac risks = 0 points     RCRI Interpretation: 1 point: Class II (low risk - 0.9% complication rate)         Assessment & Plan   The proposed surgical procedure is considered INTERMEDIATE risk.    Problem List Items Addressed This Visit        Nervous and Auditory    Arthralgia of multiple joints    Polymyalgia rheumatica (H)       Respiratory    AILEEN on CPAP - uses nightly, finds very helpful - Primary       Digestive    Morbid obesity (H)      Other Visit Diagnoses     Preop general physical exam        Relevant Orders    CBC and Differential (Completed)    Basic Metabolic Panel (Completed)             Risks and Recommendations:  The patient has the following additional risks and recommendations for perioperative complications:   - Consult Hospitalist / IM to assist  with post-op medical management   - Morbid obesity (BMI >40)  Obstructive Sleep Apnea:       Medication Instructions:  Patient is to take all scheduled medications on the day of surgery    RECOMMENDATION:  APPROVAL GIVEN to proceed with proposed procedure, without further diagnostic evaluation.    Signed Electronically by: TREV Bejarano CNP    Copy of this evaluation report is provided to requesting physician.    Preop [facility] Preop Guidelines    Revised Cardiac Risk Index   vomiting

## 2023-12-18 ENCOUNTER — APPOINTMENT (OUTPATIENT)
Dept: CARDIOLOGY | Facility: CLINIC | Age: 38
End: 2023-12-18

## 2023-12-21 ENCOUNTER — OUTPATIENT (OUTPATIENT)
Dept: OUTPATIENT SERVICES | Facility: HOSPITAL | Age: 38
LOS: 1 days | End: 2023-12-21
Payer: MEDICAID

## 2023-12-21 ENCOUNTER — APPOINTMENT (OUTPATIENT)
Dept: MATERNAL FETAL MEDICINE | Facility: CLINIC | Age: 38
End: 2023-12-21

## 2023-12-21 ENCOUNTER — APPOINTMENT (OUTPATIENT)
Dept: INFECTIOUS DISEASE | Facility: CLINIC | Age: 38
End: 2023-12-21
Payer: MEDICAID

## 2023-12-21 DIAGNOSIS — R76.12 NONSPECIFIC REACTION TO CELL MEDIATED IMMUNITY MEASUREMENT OF GAMMA INTERFERON ANTIGEN RESPONSE W/OUT ACTIVE TUBERCULOSIS: ICD-10-CM

## 2023-12-21 DIAGNOSIS — B97.89 OTHER VIRAL AGENTS AS THE CAUSE OF DISEASES CLASSIFIED ELSEWHERE: ICD-10-CM

## 2023-12-21 DIAGNOSIS — R76.12 NONSPECIFIC REACTION TO CELL MEDIATED IMMUNITY MEASUREMENT OF GAMMA INTERFERON ANTIGEN RESPONSE WITHOUT ACTIVE TUBERCULOSIS: ICD-10-CM

## 2023-12-21 PROCEDURE — 99215 OFFICE O/P EST HI 40 MIN: CPT

## 2023-12-21 NOTE — HISTORY OF PRESENT ILLNESS
[FreeTextEntry1] : Ms. Kamryn Haley Is a 38-year-old female who is currently 10 weeks pregnant who is being seen in ID clinic today due to a recent positive QuantiFERON test.  Patient following up regularly with OB/GYN for prenatal care.  QuantiFERON obtained on 11/3/2023 is positive. Chest x-ray obtained on November 2023 shows no evidence for active pulmonary tuberculosis.   Patient denies any known exposure to TB.  Denies any current symptoms including cough, fevers, chills, weight loss, night sweats.  Patient lives at home with  and 2 children, no one is sick at home.  Patient has been living the night states for the past 8 years.  Lives in the [place] area.  Works at [employer].  Denies any previous TB testing.

## 2023-12-21 NOTE — ASSESSMENT
[FreeTextEntry1] : Ms. Kamryn Haley Is a 38-year-old female who is currently 10 weeks pregnant who is being seen in ID clinic today due to a recent positive QuantiFERON test.  Patient presents with  today,  service used during encounter.  Discussed treatment options and rationale for treatment going forward.  Offered options including starting therapy now which would require isoniazid daily for 9 months, also discussed waiting to treat following delivery.  Discussed risks and benefits of each approach.  Explained latent TB versus risk for active TB.   Patient  stated understanding.  Patient elects to defer therapy for LTBI at this time and is interested in being treated following her pregnancy.  Therefore, will make an appointment for after delivery and pursue 12-week therapy of rifapentine and isoniazid.  Diego Piper MD Division of Infectious Diseases

## 2023-12-21 NOTE — PHYSICAL EXAM
[General Appearance - Alert] : alert [General Appearance - In No Acute Distress] : in no acute distress [Neck Appearance] : the appearance of the neck was normal [Neck Cervical Mass (___cm)] : no neck mass was observed [Jugular Venous Distention Increased] : there was no jugular-venous distention [Thyroid Diffuse Enlargement] : the thyroid was not enlarged [Auscultation Breath Sounds / Voice Sounds] : lungs were clear to auscultation bilaterally [Heart Rate And Rhythm] : heart rate was normal and rhythm regular [Heart Sounds] : normal S1 and S2 [Heart Sounds Gallop] : no gallops [Murmurs] : no murmurs [Heart Sounds Pericardial Friction Rub] : no pericardial rub [Bowel Sounds] : normal bowel sounds [Abdomen Soft] : soft [Abdomen Tenderness] : non-tender [Abdomen Mass (___ Cm)] : no abdominal mass palpated [Costovertebral Angle Tenderness] : no CVA tenderness [No Palpable Adenopathy] : no palpable adenopathy [Nail Clubbing] : no clubbing  or cyanosis of the fingernails [Musculoskeletal - Swelling] : no joint swelling [Motor Tone] : muscle strength and tone were normal [Skin Color & Pigmentation] : normal skin color and pigmentation [] : no rash [Deep Tendon Reflexes (DTR)] : deep tendon reflexes were 2+ and symmetric [Sensation] : the sensory exam was normal to light touch and pinprick [No Focal Deficits] : no focal deficits [Oriented To Time, Place, And Person] : oriented to person, place, and time [Affect] : the affect was normal

## 2023-12-22 ENCOUNTER — NON-APPOINTMENT (OUTPATIENT)
Age: 38
End: 2023-12-22

## 2023-12-22 ENCOUNTER — APPOINTMENT (OUTPATIENT)
Dept: MATERNAL FETAL MEDICINE | Facility: CLINIC | Age: 38
End: 2023-12-22
Payer: MEDICAID

## 2023-12-22 VITALS
BODY MASS INDEX: 29.92 KG/M2 | SYSTOLIC BLOOD PRESSURE: 90 MMHG | HEIGHT: 61 IN | HEART RATE: 73 BPM | OXYGEN SATURATION: 99 % | WEIGHT: 158.5 LBS | DIASTOLIC BLOOD PRESSURE: 60 MMHG

## 2023-12-22 DIAGNOSIS — O09.511 SUPERVISION OF ELDERLY PRIMIGRAVIDA, FIRST TRIMESTER: ICD-10-CM

## 2023-12-22 LAB
BILIRUB UR QL STRIP: NORMAL
COLLECTION METHOD: NORMAL
GLUCOSE UR-MCNC: NORMAL
HCG UR QL: 0.2 EU/DL
HGB UR QL STRIP.AUTO: NORMAL
KETONES UR-MCNC: NORMAL
LEUKOCYTE ESTERASE UR QL STRIP: NORMAL
NITRITE UR QL STRIP: NORMAL
PH UR STRIP: 7
PROT UR STRIP-MCNC: NORMAL
SP GR UR STRIP: 1.01

## 2023-12-22 PROCEDURE — 99213 OFFICE O/P EST LOW 20 MIN: CPT | Mod: GE,25

## 2023-12-22 PROCEDURE — 87086 URINE CULTURE/COLONY COUNT: CPT

## 2023-12-22 PROCEDURE — G0108 DIAB MANAGE TRN  PER INDIV: CPT

## 2023-12-22 PROCEDURE — G0463: CPT

## 2023-12-23 ENCOUNTER — LABORATORY RESULT (OUTPATIENT)
Age: 38
End: 2023-12-23

## 2023-12-23 ENCOUNTER — RX RENEWAL (OUTPATIENT)
Age: 38
End: 2023-12-23

## 2023-12-23 RX ORDER — BLOOD SUGAR DIAGNOSTIC
STRIP MISCELLANEOUS
Qty: 200 | Refills: 5 | Status: ACTIVE | COMMUNITY
Start: 2023-12-23 | End: 1900-01-01

## 2023-12-28 ENCOUNTER — NON-APPOINTMENT (OUTPATIENT)
Age: 38
End: 2023-12-28

## 2023-12-29 ENCOUNTER — NON-APPOINTMENT (OUTPATIENT)
Age: 38
End: 2023-12-29

## 2024-01-04 ENCOUNTER — NON-APPOINTMENT (OUTPATIENT)
Age: 39
End: 2024-01-04

## 2024-01-05 ENCOUNTER — ASOB RESULT (OUTPATIENT)
Age: 39
End: 2024-01-05

## 2024-01-05 ENCOUNTER — APPOINTMENT (OUTPATIENT)
Dept: ANTEPARTUM | Facility: CLINIC | Age: 39
End: 2024-01-05
Payer: MEDICAID

## 2024-01-05 ENCOUNTER — APPOINTMENT (OUTPATIENT)
Dept: MATERNAL FETAL MEDICINE | Facility: CLINIC | Age: 39
End: 2024-01-05
Payer: MEDICAID

## 2024-01-05 ENCOUNTER — OUTPATIENT (OUTPATIENT)
Dept: OUTPATIENT SERVICES | Facility: HOSPITAL | Age: 39
LOS: 1 days | End: 2024-01-05
Payer: MEDICAID

## 2024-01-05 ENCOUNTER — LABORATORY RESULT (OUTPATIENT)
Age: 39
End: 2024-01-05

## 2024-01-05 VITALS
HEART RATE: 85 BPM | WEIGHT: 157.38 LBS | BODY MASS INDEX: 29.71 KG/M2 | OXYGEN SATURATION: 99 % | DIASTOLIC BLOOD PRESSURE: 70 MMHG | SYSTOLIC BLOOD PRESSURE: 108 MMHG | HEIGHT: 61 IN

## 2024-01-05 DIAGNOSIS — O09.522 SUPERVISION OF ELDERLY MULTIGRAVIDA, SECOND TRIMESTER: ICD-10-CM

## 2024-01-05 DIAGNOSIS — O09.90 SUPERVISION OF HIGH RISK PREGNANCY, UNSPECIFIED, UNSPECIFIED TRIMESTER: ICD-10-CM

## 2024-01-05 DIAGNOSIS — O35.8XX0 MATERNAL CARE FOR OTHER (SUSPECTED) FETAL ABNORMALITY AND DAMAGE, NOT APPLICABLE OR UNSPECIFIED: ICD-10-CM

## 2024-01-05 DIAGNOSIS — O34.219 MATERNAL CARE FOR UNSPECIFIED TYPE SCAR FROM PREVIOUS CESAREAN DELIVERY: ICD-10-CM

## 2024-01-05 DIAGNOSIS — Z36.2 ENCOUNTER FOR OTHER ANTENATAL SCREENING FOLLOW-UP: ICD-10-CM

## 2024-01-05 DIAGNOSIS — Z3A.17 17 WEEKS GESTATION OF PREGNANCY: ICD-10-CM

## 2024-01-05 LAB
BILIRUB UR QL STRIP: NORMAL
COLLECTION METHOD: NORMAL
GLUCOSE UR-MCNC: NORMAL
HCG UR QL: 1 EU/DL
HGB UR QL STRIP.AUTO: NORMAL
KETONES UR-MCNC: NORMAL
LEUKOCYTE ESTERASE UR QL STRIP: NORMAL
NITRITE UR QL STRIP: NORMAL
PH UR STRIP: 7
PROT UR STRIP-MCNC: NORMAL
SP GR UR STRIP: 1.02

## 2024-01-05 PROCEDURE — G0463: CPT

## 2024-01-05 PROCEDURE — 99204 OFFICE O/P NEW MOD 45 MIN: CPT | Mod: 95

## 2024-01-05 PROCEDURE — 81003 URINALYSIS AUTO W/O SCOPE: CPT

## 2024-01-05 PROCEDURE — 76805 OB US >/= 14 WKS SNGL FETUS: CPT

## 2024-01-05 PROCEDURE — 76805 OB US >/= 14 WKS SNGL FETUS: CPT | Mod: 26

## 2024-01-05 PROCEDURE — 99212 OFFICE O/P EST SF 10 MIN: CPT | Mod: GE,25

## 2024-01-05 PROCEDURE — G0108: CPT

## 2024-01-05 PROCEDURE — G0108 DIAB MANAGE TRN  PER INDIV: CPT

## 2024-01-05 PROCEDURE — 87086 URINE CULTURE/COLONY COUNT: CPT

## 2024-01-06 ENCOUNTER — LABORATORY RESULT (OUTPATIENT)
Age: 39
End: 2024-01-06

## 2024-01-19 ENCOUNTER — OUTPATIENT (OUTPATIENT)
Dept: OUTPATIENT SERVICES | Facility: HOSPITAL | Age: 39
LOS: 1 days | End: 2024-01-19
Payer: MEDICAID

## 2024-01-19 ENCOUNTER — APPOINTMENT (OUTPATIENT)
Dept: MATERNAL FETAL MEDICINE | Facility: CLINIC | Age: 39
End: 2024-01-19
Payer: MEDICAID

## 2024-01-19 VITALS
HEART RATE: 71 BPM | WEIGHT: 158 LBS | HEIGHT: 61 IN | OXYGEN SATURATION: 99 % | BODY MASS INDEX: 29.83 KG/M2 | SYSTOLIC BLOOD PRESSURE: 104 MMHG | DIASTOLIC BLOOD PRESSURE: 60 MMHG

## 2024-01-19 DIAGNOSIS — O34.219 MATERNAL CARE FOR UNSPECIFIED TYPE SCAR FROM PREVIOUS CESAREAN DELIVERY: ICD-10-CM

## 2024-01-19 DIAGNOSIS — O24.112 PRE-EXISTING TYPE 2 DIABETES MELLITUS, IN PREGNANCY, SECOND TRIMESTER: ICD-10-CM

## 2024-01-19 DIAGNOSIS — O09.529 SUPERVISION OF ELDERLY MULTIGRAVIDA, UNSPECIFIED TRIMESTER: ICD-10-CM

## 2024-01-19 DIAGNOSIS — O09.90 SUPERVISION OF HIGH RISK PREGNANCY, UNSPECIFIED, UNSPECIFIED TRIMESTER: ICD-10-CM

## 2024-01-19 DIAGNOSIS — O35.9XX0 MATERNAL CARE FOR (SUSPECTED) FETAL ABNORMALITY AND DAMAGE, UNSPECIFIED, NOT APPLICABLE OR UNSPECIFIED: ICD-10-CM

## 2024-01-19 DIAGNOSIS — O09.899 SUPERVISION OF OTHER HIGH RISK PREGNANCIES, UNSPECIFIED TRIMESTER: ICD-10-CM

## 2024-01-19 LAB
AFP MOM: 1.69
AFP VALUE: 39.2 NG/ML
ALPHA FETOPROTEIN SERUM COMMENT: NORMAL
ALPHA FETOPROTEIN SERUM INTERPRETATION: NORMAL
ALPHA FETOPROTEIN SERUM RESULTS: NORMAL
ALPHA FETOPROTEIN SERUM TEST RESULTS: NORMAL
BILIRUB UR QL STRIP: NORMAL
COLLECTION METHOD: NORMAL
GESTATIONAL AGE BASED ON: NORMAL
GESTATIONAL AGE ON COLLECTION DATE: 15.1 WEEKS
GLUCOSE UR-MCNC: NORMAL
HCG UR QL: 0.2 EU/DL
HGB UR QL STRIP.AUTO: NORMAL
INSULIN DEP DIABETES: YES
KETONES UR-MCNC: NORMAL
LEUKOCYTE ESTERASE UR QL STRIP: NORMAL
MATERNAL AGE AT EDD AFP: 39 YR
MULTIPLE GESTATION: NO
NITRITE UR QL STRIP: NORMAL
OSBR RISK 1 IN: 499
PH UR STRIP: NORMAL
PROT UR STRIP-MCNC: NORMAL
RACE: NORMAL
SP GR UR STRIP: 1.02
WEIGHT AFP: 158 LBS

## 2024-01-19 PROCEDURE — G0108: CPT

## 2024-01-19 PROCEDURE — G0108 DIAB MANAGE TRN  PER INDIV: CPT

## 2024-01-19 PROCEDURE — G0463: CPT

## 2024-01-19 PROCEDURE — 99213 OFFICE O/P EST LOW 20 MIN: CPT | Mod: GC,25

## 2024-01-19 PROCEDURE — 81003 URINALYSIS AUTO W/O SCOPE: CPT

## 2024-01-23 ENCOUNTER — ASOB RESULT (OUTPATIENT)
Age: 39
End: 2024-01-23

## 2024-01-23 ENCOUNTER — APPOINTMENT (OUTPATIENT)
Dept: ANTEPARTUM | Facility: CLINIC | Age: 39
End: 2024-01-23
Payer: MEDICAID

## 2024-01-23 ENCOUNTER — APPOINTMENT (OUTPATIENT)
Dept: PEDIATRIC CARDIOLOGY | Facility: CLINIC | Age: 39
End: 2024-01-23
Payer: MEDICAID

## 2024-01-23 PROCEDURE — 99203 OFFICE O/P NEW LOW 30 MIN: CPT

## 2024-01-23 PROCEDURE — 76820 UMBILICAL ARTERY ECHO: CPT

## 2024-01-23 PROCEDURE — 76825 ECHO EXAM OF FETAL HEART: CPT

## 2024-01-23 PROCEDURE — 93325 DOPPLER ECHO COLOR FLOW MAPG: CPT | Mod: 59

## 2024-01-23 PROCEDURE — 76811 OB US DETAILED SNGL FETUS: CPT

## 2024-01-23 PROCEDURE — 76821 MIDDLE CEREBRAL ARTERY ECHO: CPT

## 2024-01-23 PROCEDURE — 99204 OFFICE O/P NEW MOD 45 MIN: CPT

## 2024-01-23 PROCEDURE — 76827 ECHO EXAM OF FETAL HEART: CPT

## 2024-01-26 ENCOUNTER — APPOINTMENT (OUTPATIENT)
Dept: MATERNAL FETAL MEDICINE | Facility: CLINIC | Age: 39
End: 2024-01-26

## 2024-01-26 ENCOUNTER — APPOINTMENT (OUTPATIENT)
Dept: ANTEPARTUM | Facility: CLINIC | Age: 39
End: 2024-01-26

## 2024-01-31 ENCOUNTER — NON-APPOINTMENT (OUTPATIENT)
Age: 39
End: 2024-01-31

## 2024-02-02 ENCOUNTER — NON-APPOINTMENT (OUTPATIENT)
Age: 39
End: 2024-02-02

## 2024-02-05 ENCOUNTER — NON-APPOINTMENT (OUTPATIENT)
Age: 39
End: 2024-02-05

## 2024-02-06 ENCOUNTER — APPOINTMENT (OUTPATIENT)
Dept: MATERNAL FETAL MEDICINE | Facility: CLINIC | Age: 39
End: 2024-02-06
Payer: MEDICAID

## 2024-02-06 ENCOUNTER — OUTPATIENT (OUTPATIENT)
Dept: OUTPATIENT SERVICES | Facility: HOSPITAL | Age: 39
LOS: 1 days | End: 2024-02-06
Payer: MEDICAID

## 2024-02-06 VITALS
DIASTOLIC BLOOD PRESSURE: 70 MMHG | HEIGHT: 61 IN | HEART RATE: 94 BPM | OXYGEN SATURATION: 99 % | WEIGHT: 159 LBS | BODY MASS INDEX: 30.02 KG/M2 | SYSTOLIC BLOOD PRESSURE: 110 MMHG

## 2024-02-06 DIAGNOSIS — O24.119 PRE-EXISTING TYPE 2 DIABETES MELLITUS, IN PREGNANCY, UNSPECIFIED TRIMESTER: ICD-10-CM

## 2024-02-06 DIAGNOSIS — Q61.4 RENAL DYSPLASIA: ICD-10-CM

## 2024-02-06 DIAGNOSIS — O09.899 SUPERVISION OF OTHER HIGH RISK PREGNANCIES, UNSPECIFIED TRIMESTER: ICD-10-CM

## 2024-02-06 DIAGNOSIS — O09.90 SUPERVISION OF HIGH RISK PREGNANCY, UNSPECIFIED, UNSPECIFIED TRIMESTER: ICD-10-CM

## 2024-02-06 LAB
BILIRUB UR QL STRIP: NORMAL
COLLECTION METHOD: NORMAL
GLUCOSE UR-MCNC: NORMAL
HCG UR QL: 1 EU/DL
HGB UR QL STRIP.AUTO: NORMAL
KETONES UR-MCNC: NORMAL
LEUKOCYTE ESTERASE UR QL STRIP: NORMAL
NITRITE UR QL STRIP: NORMAL
PH UR STRIP: 6.5
PROT UR STRIP-MCNC: 30
SP GR UR STRIP: 1.02

## 2024-02-06 PROCEDURE — 99213 OFFICE O/P EST LOW 20 MIN: CPT | Mod: GC,25

## 2024-02-06 PROCEDURE — G0108: CPT

## 2024-02-06 PROCEDURE — 81003 URINALYSIS AUTO W/O SCOPE: CPT

## 2024-02-06 PROCEDURE — G0108 DIAB MANAGE TRN  PER INDIV: CPT

## 2024-02-06 PROCEDURE — G0463: CPT

## 2024-02-06 RX ORDER — PEN NEEDLE, DIABETIC 29 G X1/2"
32G X 4 MM NEEDLE, DISPOSABLE MISCELLANEOUS
Qty: 1 | Refills: 1 | Status: DISCONTINUED | COMMUNITY
Start: 2021-04-02 | End: 2024-02-06

## 2024-02-07 ENCOUNTER — OUTPATIENT (OUTPATIENT)
Dept: OUTPATIENT SERVICES | Facility: HOSPITAL | Age: 39
LOS: 1 days | End: 2024-02-07
Payer: MEDICAID

## 2024-02-07 VITALS
HEART RATE: 83 BPM | DIASTOLIC BLOOD PRESSURE: 58 MMHG | TEMPERATURE: 98 F | RESPIRATION RATE: 18 BRPM | SYSTOLIC BLOOD PRESSURE: 110 MMHG

## 2024-02-07 VITALS
DIASTOLIC BLOOD PRESSURE: 53 MMHG | SYSTOLIC BLOOD PRESSURE: 100 MMHG | RESPIRATION RATE: 18 BRPM | TEMPERATURE: 98 F | HEART RATE: 75 BPM

## 2024-02-07 DIAGNOSIS — O26.899 OTHER SPECIFIED PREGNANCY RELATED CONDITIONS, UNSPECIFIED TRIMESTER: ICD-10-CM

## 2024-02-07 LAB
APPEARANCE UR: ABNORMAL
BACTERIA # UR AUTO: ABNORMAL /HPF
BILIRUB UR-MCNC: NEGATIVE — SIGNIFICANT CHANGE UP
CAST: 0 /LPF — SIGNIFICANT CHANGE UP (ref 0–4)
COLOR SPEC: YELLOW — SIGNIFICANT CHANGE UP
DIFF PNL FLD: NEGATIVE — SIGNIFICANT CHANGE UP
GLUCOSE UR QL: NEGATIVE MG/DL — SIGNIFICANT CHANGE UP
KETONES UR-MCNC: NEGATIVE MG/DL — SIGNIFICANT CHANGE UP
LEUKOCYTE ESTERASE UR-ACNC: ABNORMAL
NITRITE UR-MCNC: NEGATIVE — SIGNIFICANT CHANGE UP
PH UR: 8 — SIGNIFICANT CHANGE UP (ref 5–8)
PROT UR-MCNC: NEGATIVE MG/DL — SIGNIFICANT CHANGE UP
RBC CASTS # UR COMP ASSIST: 1 /HPF — SIGNIFICANT CHANGE UP (ref 0–4)
SP GR SPEC: 1.01 — SIGNIFICANT CHANGE UP (ref 1–1.03)
SQUAMOUS # UR AUTO: 11 /HPF — HIGH (ref 0–5)
UROBILINOGEN FLD QL: 0.2 MG/DL — SIGNIFICANT CHANGE UP (ref 0.2–1)
WBC UR QL: 8 /HPF — HIGH (ref 0–5)

## 2024-02-07 PROCEDURE — 81001 URINALYSIS AUTO W/SCOPE: CPT

## 2024-02-07 PROCEDURE — 99235 HOSP IP/OBS SAME DATE MOD 70: CPT

## 2024-02-07 PROCEDURE — G0463: CPT

## 2024-02-07 NOTE — OB PROVIDER TRIAGE NOTE - NSOBPROVIDERNOTE_OBGYN_ALL_OB_FT
37yo F  @21+6 here for two episodes of vaginal bleeding with wiping today. No bleeding on speculum exam and CL normal at 4.97cm. FHR wnl at 148.    Plan  - f/u UA  - f/u Urine culture    Patient discussed with attending physician, Dr. Snell.    JRE Jarvis, PGY3 39yo F  @21+6 here for two episodes of vaginal bleeding with wiping today. No bleeding on speculum exam and CL normal at 4.97cm. FHR wnl at 148.    Plan  - f/u UA  - f/u Urine culture    Patient discussed with attending physician, Dr. Snell.    JER Jarvis, PGY3    **Update**  UA shows large LE and negative nitrite however contaminated with epithelial cells.     - discharge home with follow up in [facility] clinic   - supportive care with heat packs and Tylenol PRN for presumed musculoskeletal pain  - f/u urine culture    seen and d/w Dr. Channing Jarvis, PGY3

## 2024-02-07 NOTE — OB PROVIDER TRIAGE NOTE - ATTENDING COMMENTS
/Attendiny/o  @21.6wks who presents to L&D w/ c/o vagina; bleeding x2 upon wiping.  Pt discussed w/ me; chart reviewed; pt seen at bedside with the above Resident and I agree with the assessment and plan.    Pt c/o vaginal bleeding noted on wiping 2x on  w/ last episode @12:30pm. Pt denies any sexual intercourse and denies any vaginal bleeding at this time; denies dysuria or suprapubic pain.  Pt also c/o left lower back pain that radiates to left lower quadrant on movement, however not new and was evaluated in triage for the same issue previously.  Pt w/ PNC in [facility] 2nd to T2DM (on Lantus 20, humalog 10/8/8) .  Pt also w/ hx of C/S x2 (last delivery in ).    This fetus also w/ possible MCKD.    VSS, afebrile  - + left lower back tenderness on palpation and able to reproduce in left lower quadrant; no CVA tenderness; no suprapubic tenderness  - +FHR, 148bpm  - [place]: no contractions  - Sono: vertex, grossly normal fluid and movement, CL 4.97cm  -Speculum exam: no vaginal or cervical bleeding; no pooling    U/A: dirty w/ 13 epithelial cells; + leuks; neg nitrites;  neg blood    A/P  -IUP at 21.6wks w/ T2DM, w/ c/o previous episodes of vaginal bleeding on wiping and left low back pain on movement.  -NO vaginal bleeding noted at this time and pt reassured.  -Low back pain seem musculoskeletal in nature and is not new; pt counseled to return if increases and recommend hot pack and Tylenol as needed.  -Pt to f/u in [facility] on  as scheduled  -Pt stable for D/C home w/ PTL, rupture, and bleeding precautions given; will f/u UCx.    Dr. Carvajal

## 2024-02-07 NOTE — OB PROVIDER TRIAGE NOTE - HISTORY OF PRESENT ILLNESS
HPI: 37yo F  @21+6 presents for 2 episodes of vaginal bleeding with wiping today, last episode at 12:30pm. Patient also reports sharp left lower back pain radiating to the front left that also started today. Denies fevers, chills, N/V, dysuria, constipation. -LOF. -CTXs. -VB. Pt denies any other concerns.  Patient follows in [facility]    PNC: T2DM    OBHx:    pC/S 11#  2021 rC/S 6#8 concern for uterine rupture (hysterotomy intact)    GynHx: denies hx STIs, fibroids, polyps, cysts  PMH: denies hx clotting or bleeding disorders, HTN, DM  PSH: denies  PFH: no hx congenital disorders, bleeding/clotting disorders  Psych: denies   Social: denies etoh, smoking, drugs. Safe at home/in relationship.  Meds: Lantus 20, humalog 10/8/8, PNV   Allergies: NKDA  Will accept blood transfusions? Yes HPI: 39yo F  @21+6 presents for 2 episodes of vaginal bleeding with wiping today, last episode at 12:30pm. Patient also reports sharp left lower back pain radiating to the front left that also started today. Denies fevers, chills, N/V, dysuria, constipation. Last bowel movement 2 days ago. Denies intercourse within the past week. -LOF. -CTXs. -VB. Pt denies any other concerns.  Patient follows in [facility]    PNC: T2DM    OBHx:    pC/S 11#  2021 rC/S 6#8 concern for uterine rupture (hysterotomy intact)    GynHx: denies hx STIs, fibroids, polyps, cysts  PMH: denies hx clotting or bleeding disorders, HTN, DM  PSH: denies  PFH: no hx congenital disorders, bleeding/clotting disorders  Psych: denies   Social: denies etoh, smoking, drugs. Safe at home/in relationship.  Meds: Lantus 20, humalog 10/8/8, PNV   Allergies: NKDA  Will accept blood transfusions? Yes HPI: 37yo F  @21+6 presents for 2 episodes of vaginal bleeding with wiping today, last episode at 12:30pm. Patient also reports sharp left lower back pain radiating to the front left that is similar to the pain she presented to the ED with on . Pain is intermittent and worse with movement. Denies fevers, chills, N/V, dysuria, constipation. Last bowel movement 2 days ago. Denies intercourse within the past week. -LOF. -CTXs. -VB. Pt denies any other concerns.  Patient follows in [facility]    PNC: T2DM    OBHx:    pC/S 11#  2021 rC/S 6#8 concern for uterine rupture (hysterotomy intact)    GynHx: denies hx STIs, fibroids, polyps, cysts  PMH: denies hx clotting or bleeding disorders, HTN, DM  PSH: denies  PFH: no hx congenital disorders, bleeding/clotting disorders  Psych: denies   Social: denies etoh, smoking, drugs. Safe at home/in relationship.  Meds: Lantus 20, humalog 10/8/8, PNV   Allergies: NKDA  Will accept blood transfusions? Yes HPI: 37yo F  @21+6 presents for 2 episodes of vaginal bleeding with wiping today, last episode at 12:30pm. Patient also reports sharp left lower back pain radiating to the front left that is similar to the pain she presented to the ED with on . Pain is intermittent and worse with movement. Denies fevers, chills, N/V, dysuria, constipation. Last bowel movement 2 days ago. Denies intercourse within the past week. -LOF. -CTXs. -VB. Pt denies any other concerns.  Patient follows in [facility]    PNC: T2DM; FA for suspected MCDK    OBHx:    pC/S 11#  2021 rC/S 6#8 concern for uterine rupture (hysterotomy intact)    GynHx: denies hx STIs, fibroids, polyps, cysts  PMH: denies hx clotting or bleeding disorders, HTN, DM  PSH: denies  PFH: no hx congenital disorders, bleeding/clotting disorders  Psych: denies   Social: denies etoh, smoking, drugs. Safe at home/in relationship.  Meds: Lantus 20, humalog 10/8/8, PNV   Allergies: NKDA  Will accept blood transfusions? Yes

## 2024-02-07 NOTE — OB PROVIDER TRIAGE NOTE - NSHPPHYSICALEXAM_GEN_ALL_CORE
T(C): 36.5 (02-07-24 @ 02:05), Max: 36.7 (02-07-24 @ 01:14)  HR: 77 (02-07-24 @ 02:31) (77 - 87)  BP: 110/58 (02-07-24 @ 02:05) (110/58 - 110/68)  RR: 16 (02-07-24 @ 02:05) (16 - 18)  SpO2: 98% (02-07-24 @ 02:31) (98% - 100%)    Gen: NAD  CV: RRR  Pulm: breathing comfortably on RA  Back: moderate pain in left lower back, no pain on right  Abd: gravid, moderately tender in LLQ  Extr: moving all extremities with ease  – Spec: neg pooling, neg bleeding, cervix appears closed. Scant cottage cheese like discharge  –   – [place]: absent  – Sono: vertex, grossly normal fluid and movement, CL 4.97cm 36.6 T(C): 36.5 (02-07-24 @ 02:05), Max: 36.7 (02-07-24 @ 01:14)  HR: 77 (02-07-24 @ 02:31) (77 - 87)  BP: 110/58 (02-07-24 @ 02:05) (110/58 - 110/68)  RR: 16 (02-07-24 @ 02:05) (16 - 18)  SpO2: 98% (02-07-24 @ 02:31) (98% - 100%)    Gen: NAD  CV: RRR  Pulm: breathing comfortably on RA  Back: moderate tenderness in left lower back, no pain on right. No CVA tenderness  Abd: gravid, moderately tender in LLQ  Extr: moving all extremities with ease  – Spec: neg pooling, neg bleeding, cervix appears closed. Scant cottage cheese like discharge  –   – [place]: absent  – Sono: vertex, grossly normal fluid and movement, CL 4.97cm T(C): 36.5 (02-07-24 @ 02:05), Max: 36.7 (02-07-24 @ 01:14)  HR: 77 (02-07-24 @ 02:31) (77 - 87)  BP: 110/58 (02-07-24 @ 02:05) (110/58 - 110/68)  RR: 16 (02-07-24 @ 02:05) (16 - 18)  SpO2: 98% (02-07-24 @ 02:31) (98% - 100%)    Gen: NAD  CV: RRR  Pulm: breathing comfortably on RA  Back: moderate tenderness in left lower back, no tenderness on right. No CVA tenderness  Abd: gravid, moderately tender in LLQ  Extr: moving all extremities with ease  – Spec: neg pooling, neg bleeding, cervix appears closed. Scant cottage cheese like discharge  –   – [place]: absent  – Sono: vertex, grossly normal fluid and movement, CL 4.97cm

## 2024-02-07 NOTE — OB RN TRIAGE NOTE - CHIEF COMPLAINT QUOTE
I am having a small amount of vaginal bleeding. I am having a small amount of vaginal bleeding and lower back pain.

## 2024-02-08 ENCOUNTER — APPOINTMENT (OUTPATIENT)
Dept: ENDOCRINOLOGY | Facility: CLINIC | Age: 39
End: 2024-02-08

## 2024-02-08 DIAGNOSIS — O34.219 MATERNAL CARE FOR UNSPECIFIED TYPE SCAR FROM PREVIOUS CESAREAN DELIVERY: ICD-10-CM

## 2024-02-08 DIAGNOSIS — M54.50 LOW BACK PAIN, UNSPECIFIED: ICD-10-CM

## 2024-02-08 DIAGNOSIS — O09.522 SUPERVISION OF ELDERLY MULTIGRAVIDA, SECOND TRIMESTER: ICD-10-CM

## 2024-02-08 DIAGNOSIS — Z3A.21 21 WEEKS GESTATION OF PREGNANCY: ICD-10-CM

## 2024-02-08 DIAGNOSIS — O09.292 SUPERVISION OF PREGNANCY WITH OTHER POOR REPRODUCTIVE OR OBSTETRIC HISTORY, SECOND TRIMESTER: ICD-10-CM

## 2024-02-08 DIAGNOSIS — O46.92 ANTEPARTUM HEMORRHAGE, UNSPECIFIED, SECOND TRIMESTER: ICD-10-CM

## 2024-02-08 DIAGNOSIS — O99.891 OTHER SPECIFIED DISEASES AND CONDITIONS COMPLICATING PREGNANCY: ICD-10-CM

## 2024-02-16 ENCOUNTER — NON-APPOINTMENT (OUTPATIENT)
Age: 39
End: 2024-02-16

## 2024-02-16 ENCOUNTER — LABORATORY RESULT (OUTPATIENT)
Age: 39
End: 2024-02-16

## 2024-02-16 ENCOUNTER — ASOB RESULT (OUTPATIENT)
Age: 39
End: 2024-02-16

## 2024-02-16 ENCOUNTER — APPOINTMENT (OUTPATIENT)
Dept: ANTEPARTUM | Facility: CLINIC | Age: 39
End: 2024-02-16
Payer: MEDICAID

## 2024-02-16 ENCOUNTER — APPOINTMENT (OUTPATIENT)
Dept: MATERNAL FETAL MEDICINE | Facility: CLINIC | Age: 39
End: 2024-02-16
Payer: MEDICAID

## 2024-02-16 ENCOUNTER — OUTPATIENT (OUTPATIENT)
Dept: OUTPATIENT SERVICES | Facility: HOSPITAL | Age: 39
LOS: 1 days | End: 2024-02-16
Payer: MEDICAID

## 2024-02-16 VITALS
SYSTOLIC BLOOD PRESSURE: 98 MMHG | OXYGEN SATURATION: 98 % | BODY MASS INDEX: 30.28 KG/M2 | DIASTOLIC BLOOD PRESSURE: 60 MMHG | HEART RATE: 86 BPM | WEIGHT: 160.38 LBS | HEIGHT: 61 IN

## 2024-02-16 DIAGNOSIS — O26.899 OTHER SPECIFIED PREGNANCY RELATED CONDITIONS, UNSPECIFIED TRIMESTER: ICD-10-CM

## 2024-02-16 DIAGNOSIS — O09.529 SUPERVISION OF ELDERLY MULTIGRAVIDA, UNSPECIFIED TRIMESTER: ICD-10-CM

## 2024-02-16 DIAGNOSIS — E11.9 TYPE 2 DIABETES MELLITUS WITHOUT COMPLICATIONS: ICD-10-CM

## 2024-02-16 DIAGNOSIS — O35.9XX9 MATERNAL CARE FOR (SUSPECTED) FETAL ABNORMALITY AND DAMAGE, UNSPECIFIED, OTHER FETUS: ICD-10-CM

## 2024-02-16 LAB
A1C WITH ESTIMATED AVERAGE GLUCOSE RESULT: 5.3 % — SIGNIFICANT CHANGE UP (ref 4–5.6)
BILIRUB UR QL STRIP: NORMAL
CLARITY UR: CLEAR
COLLECTION METHOD: NORMAL
ESTIMATED AVERAGE GLUCOSE: 105 MG/DL — SIGNIFICANT CHANGE UP (ref 68–114)
FRUCTOSAMINE SERPL-MCNC: 239 UMOL/L — SIGNIFICANT CHANGE UP (ref 205–285)
GLUCOSE UR-MCNC: NORMAL
HCG UR QL: 0.2 EU/DL
HGB UR QL STRIP.AUTO: NORMAL
KETONES UR-MCNC: NORMAL
LEUKOCYTE ESTERASE UR QL STRIP: NORMAL
NITRITE UR QL STRIP: NORMAL
PH UR STRIP: 7
PROT UR STRIP-MCNC: NORMAL
SP GR UR STRIP: 1.01

## 2024-02-16 PROCEDURE — 82985 ASSAY OF GLYCATED PROTEIN: CPT

## 2024-02-16 PROCEDURE — 76816 OB US FOLLOW-UP PER FETUS: CPT | Mod: 26

## 2024-02-16 PROCEDURE — G0463: CPT

## 2024-02-16 PROCEDURE — 83036 HEMOGLOBIN GLYCOSYLATED A1C: CPT

## 2024-02-16 PROCEDURE — 81003 URINALYSIS AUTO W/O SCOPE: CPT

## 2024-02-16 PROCEDURE — 36415 COLL VENOUS BLD VENIPUNCTURE: CPT

## 2024-02-16 PROCEDURE — G0108 DIAB MANAGE TRN  PER INDIV: CPT

## 2024-02-16 PROCEDURE — G0108: CPT

## 2024-02-16 PROCEDURE — 99213 OFFICE O/P EST LOW 20 MIN: CPT | Mod: 25

## 2024-02-16 RX ORDER — BLOOD-GLUCOSE METER
W/DEVICE KIT MISCELLANEOUS
Qty: 1 | Refills: 0 | Status: ACTIVE | COMMUNITY
Start: 2021-03-25 | End: 1900-01-01

## 2024-02-16 RX ORDER — INSULIN LISPRO 100 [IU]/ML
100 INJECTION, SOLUTION INTRAVENOUS; SUBCUTANEOUS
Qty: 1 | Refills: 1 | Status: ACTIVE | COMMUNITY
Start: 2023-11-10 | End: 1900-01-01

## 2024-02-16 RX ORDER — LANCETS 33 GAUGE
EACH MISCELLANEOUS
Qty: 2 | Refills: 1 | Status: ACTIVE | COMMUNITY
Start: 2021-03-25 | End: 1900-01-01

## 2024-02-16 RX ORDER — ISOPROPYL ALCOHOL 0.7 ML/ML
SWAB TOPICAL
Qty: 1 | Refills: 2 | Status: ACTIVE | COMMUNITY
Start: 2023-11-10 | End: 1900-01-01

## 2024-02-16 RX ORDER — PEN NEEDLE, DIABETIC 32GX 5/32"
32G X 4 MM NEEDLE, DISPOSABLE MISCELLANEOUS
Qty: 2 | Refills: 2 | Status: ACTIVE | COMMUNITY
Start: 2023-11-10 | End: 1900-01-01

## 2024-02-20 LAB
ABO + RH PNL BLD: NORMAL
ADDITIONAL US: NORMAL
ALBUMIN SERPL ELPH-MCNC: 4.7 G/DL
ALP BLD-CCNC: 83 U/L
ALT SERPL-CCNC: 10 U/L
AMPHET UR-MCNC: NEGATIVE NG/ML
ANION GAP SERPL CALC-SCNC: 13 MMOL/L
APPEARANCE: CLEAR
AR GENE MUT ANL BLD/T: NORMAL
AST SERPL-CCNC: 10 U/L
BACTERIA UR CULT: ABNORMAL
BACTERIA: ABNORMAL /HPF
BARBITURATES UR-MCNC: NEGATIVE NG/ML
BENZODIAZ UR-MCNC: NEGATIVE NG/ML
BILIRUB SERPL-MCNC: 0.4 MG/DL
BILIRUB UR QL STRIP: NORMAL
BILIRUBIN URINE: NEGATIVE
BLD GP AB SCN SERPL QL: NORMAL
BLOOD URINE: NEGATIVE
BUN SERPL-MCNC: 6 MG/DL
CALCIUM SERPL-MCNC: 9.1 MG/DL
CAST: 1 /LPF
CFTR MUT TESTED BLD/T: NEGATIVE
CHLORIDE SERPL-SCNC: 103 MMOL/L
CHROMOSOME13 INTERPRETATION: NORMAL
CHROMOSOME13 TEST RESULT: NORMAL
CHROMOSOME18 INTERPRETATION: NORMAL
CHROMOSOME18 TEST RESULT: NORMAL
CHROMOSOME21 INTERPRETATION: NORMAL
CHROMOSOME21 TEST RESULT: NORMAL
CLARITY UR: CLEAR
CO2 SERPL-SCNC: 23 MMOL/L
COCAINE METAB.OTHER UR-MCNC: NEGATIVE NG/ML
COLLECTION METHOD: NORMAL
COLOR: YELLOW
CREAT SERPL-MCNC: 0.47 MG/DL
CREATININE, URINE: 109.5 MG/DL
CRL SCAN TWIN B: NORMAL
CRL SCAN: NORMAL
CROWN RUMP LENGTH TWIN B: NORMAL
CROWN RUMP LENGTH: 0 MM
DIA MOM: NORMAL
DIA VALUE: 112 PG/ML
DOWN SYNDROME AGE RISK: NORMAL
DOWN SYNDROME INTERPRETATION: NORMAL
DOWN SYNDROME SCREENING RISK: NORMAL
EGFR: 125 ML/MIN/1.73M2
EPITHELIAL CELLS: 13 /HPF
FENTANYL, URINE: NEGATIVE NG/ML
FETAL FRACTION: NORMAL
FIRST TRIMESTER SCREEN COMMENTS: NORMAL
FIRST TRIMESTER SCREEN NOTE: NORMAL
FIRST TRIMESTER SCREEN RESULTS: NORMAL
FIRST TRIMESTER SCREEN TEST RESULTS: NORMAL
GEST. AGE ON COLLECTION DATE: NORMAL WEEKS
GLUCOSE BLDC GLUCOMTR-MCNC: 162
GLUCOSE QUALITATIVE U: >=1000 MG/DL
GLUCOSE SERPL-MCNC: 162 MG/DL
GLUCOSE UR-MCNC: 1000
HBA1C MFR BLD HPLC: 9.7
HBV SURFACE AG SER QL: NONREACTIVE
HCG MOM: NORMAL
HCG UR QL: 1 EU/DL
HCG UR QL: POSITIVE
HCG VALUE: 44.3 IU/ML
HGB A MFR BLD: 97.3 %
HGB A2 MFR BLD: 2.7 %
HGB FRACT BLD-IMP: NORMAL
HGB UR QL STRIP.AUTO: NORMAL
HIV1+2 AB SPEC QL IA.RAPID: NONREACTIVE
KETONES UR-MCNC: NORMAL
KETONES URINE: NEGATIVE MG/DL
LEAD BLD-MCNC: <1 UG/DL
LEUKOCYTE ESTERASE UR QL STRIP: NORMAL
LEUKOCYTE ESTERASE URINE: NEGATIVE
M TB IFN-G BLD-IMP: POSITIVE
MATERNAL AGE AT EDD: 38.4 YR
METHADONE UR-MCNC: NEGATIVE NG/ML
MEV IGG FLD QL IA: 18.3 AU/ML
MEV IGG+IGM SER-IMP: POSITIVE
MICRODELETIONS INTERPRETATION: NORMAL
MICRODELETIONS RESULT: NORMAL
MICROSCOPIC-UA: NORMAL
NITRITE UR QL STRIP: NORMAL
NITRITE URINE: NEGATIVE
NT MOM TWIN B: NORMAL
NT TWIN B: NORMAL
NUCHAL TRANSLUCENCY (NT): 0 MM
NUCHAL TRANSLUCENCY MOM: NORMAL
NUMBER OF FETUSES: 1
OPIATES UR-MCNC: NEGATIVE NG/ML
OXYCODONE/OXYMORPHONE, URINE: NEGATIVE NG/ML
PAPP-A MOM: NORMAL
PAPP-A VALUE: 34.1 NG/ML
PCP UR-MCNC: NEGATIVE NG/ML
PERFORMANCE AND LIMITATIONS: NORMAL
PH UR STRIP: 7
PH URINE: 7
PH, URINE: 5.6
PLEASE NOTE: DRUGSCRUR: NORMAL
POTASSIUM SERPL-SCNC: 4.3 MMOL/L
PROT SERPL-MCNC: 6.9 G/DL
PROT UR STRIP-MCNC: NORMAL
PROTEIN URINE: NEGATIVE MG/DL
QUALITY CONTROL: YES
QUANTIFERON TB PLUS MITOGEN MINUS NIL: 9.47 IU/ML
QUANTIFERON TB PLUS NIL: 0.14 IU/ML
QUANTIFERON TB PLUS TB1 MINUS NIL: 0.99 IU/ML
QUANTIFERON TB PLUS TB2 MINUS NIL: 0.72 IU/ML
RACE: NORMAL
RED BLOOD CELLS URINE: 2 /HPF
REVIEW: NORMAL
RUBV IGG FLD-ACNC: 5.1 INDEX
RUBV IGG SER-IMP: POSITIVE
SEX CHROMOSOME INTERPRETATION: NORMAL
SEX CHROMOSOME TEST RESULT: NORMAL
SODIUM SERPL-SCNC: 139 MMOL/L
SONOGRAPHER ID#: NORMAL
SP GR UR STRIP: 1.01
SPECIFIC GRAVITY URINE: 1.02
T PALLIDUM AB SER QL IA: NEGATIVE
THC UR QL: NEGATIVE NG/ML
TRISOMY 18 AGE RISK: NORMAL
TRISOMY 18 INTERPRETATION: NORMAL
TRISOMY 18 SCREENING RISK: NORMAL
UROBILINOGEN URINE: 1 MG/DL
VERIFI WITH MICRODELETIONS: NOT DETECTED
WEIGHT AFP: 168 LBS
WHITE BLOOD CELLS URINE: 2 /HPF
YEAST-LIKE CELLS: PRESENT

## 2024-02-21 ENCOUNTER — NON-APPOINTMENT (OUTPATIENT)
Age: 39
End: 2024-02-21

## 2024-02-22 ENCOUNTER — APPOINTMENT (OUTPATIENT)
Dept: CARDIOLOGY | Facility: CLINIC | Age: 39
End: 2024-02-22

## 2024-02-22 DIAGNOSIS — E11.9 TYPE 2 DIABETES MELLITUS W/OUT COMPLICATIONS: ICD-10-CM

## 2024-02-22 NOTE — HISTORY OF PRESENT ILLNESS
[FreeTextEntry1] : [id number]-   Ms. KIERSTEN PIÑA 38 year - old F is here Feb 22, 2024 to establish care in the Women's heart health program.

## 2024-02-23 ENCOUNTER — NON-APPOINTMENT (OUTPATIENT)
Age: 39
End: 2024-02-23

## 2024-02-23 ENCOUNTER — APPOINTMENT (OUTPATIENT)
Dept: MATERNAL FETAL MEDICINE | Facility: CLINIC | Age: 39
End: 2024-02-23

## 2024-02-26 ENCOUNTER — NON-APPOINTMENT (OUTPATIENT)
Age: 39
End: 2024-02-26

## 2024-03-01 ENCOUNTER — APPOINTMENT (OUTPATIENT)
Dept: MATERNAL FETAL MEDICINE | Facility: CLINIC | Age: 39
End: 2024-03-01
Payer: MEDICAID

## 2024-03-01 ENCOUNTER — OUTPATIENT (OUTPATIENT)
Dept: OUTPATIENT SERVICES | Facility: HOSPITAL | Age: 39
LOS: 1 days | End: 2024-03-01
Payer: MEDICAID

## 2024-03-01 ENCOUNTER — ASOB RESULT (OUTPATIENT)
Age: 39
End: 2024-03-01

## 2024-03-01 ENCOUNTER — APPOINTMENT (OUTPATIENT)
Dept: ANTEPARTUM | Facility: CLINIC | Age: 39
End: 2024-03-01
Payer: MEDICAID

## 2024-03-01 ENCOUNTER — NON-APPOINTMENT (OUTPATIENT)
Age: 39
End: 2024-03-01

## 2024-03-01 VITALS
DIASTOLIC BLOOD PRESSURE: 70 MMHG | BODY MASS INDEX: 31.34 KG/M2 | HEIGHT: 61 IN | SYSTOLIC BLOOD PRESSURE: 110 MMHG | WEIGHT: 166 LBS | OXYGEN SATURATION: 98 % | HEART RATE: 83 BPM

## 2024-03-01 DIAGNOSIS — O09.90 SUPERVISION OF HIGH RISK PREGNANCY, UNSPECIFIED, UNSPECIFIED TRIMESTER: ICD-10-CM

## 2024-03-01 DIAGNOSIS — E11.9 TYPE 2 DIABETES MELLITUS WITHOUT COMPLICATIONS: ICD-10-CM

## 2024-03-01 DIAGNOSIS — O09.899 SUPERVISION OF OTHER HIGH RISK PREGNANCIES, UNSPECIFIED TRIMESTER: ICD-10-CM

## 2024-03-01 LAB
BILIRUB UR QL STRIP: NORMAL
CLARITY UR: CLEAR
COLLECTION METHOD: NORMAL
GLUCOSE UR-MCNC: NORMAL
HCG UR QL: 0.2 EU/DL
HGB UR QL STRIP.AUTO: NORMAL
KETONES UR-MCNC: NORMAL
LEUKOCYTE ESTERASE UR QL STRIP: NORMAL
NITRITE UR QL STRIP: NORMAL
PH UR STRIP: 7
PROT UR STRIP-MCNC: NORMAL
SP GR UR STRIP: 1.01

## 2024-03-01 PROCEDURE — G0463: CPT

## 2024-03-01 PROCEDURE — 81003 URINALYSIS AUTO W/O SCOPE: CPT

## 2024-03-01 PROCEDURE — G0108 DIAB MANAGE TRN  PER INDIV: CPT

## 2024-03-01 PROCEDURE — 99213 OFFICE O/P EST LOW 20 MIN: CPT | Mod: 25

## 2024-03-01 PROCEDURE — 76816 OB US FOLLOW-UP PER FETUS: CPT | Mod: 26

## 2024-03-01 PROCEDURE — G0108: CPT

## 2024-03-01 RX ORDER — LANCING DEVICE
EACH MISCELLANEOUS
Qty: 120 | Refills: 4 | Status: ACTIVE | COMMUNITY
Start: 2023-02-23 | End: 1900-01-01

## 2024-03-08 ENCOUNTER — APPOINTMENT (OUTPATIENT)
Dept: MATERNAL FETAL MEDICINE | Facility: CLINIC | Age: 39
End: 2024-03-08

## 2024-03-15 ENCOUNTER — OUTPATIENT (OUTPATIENT)
Dept: OUTPATIENT SERVICES | Facility: HOSPITAL | Age: 39
LOS: 1 days | End: 2024-03-15
Payer: MEDICAID

## 2024-03-15 ENCOUNTER — APPOINTMENT (OUTPATIENT)
Dept: MATERNAL FETAL MEDICINE | Facility: CLINIC | Age: 39
End: 2024-03-15
Payer: MEDICAID

## 2024-03-15 VITALS
OXYGEN SATURATION: 98 % | HEART RATE: 90 BPM | DIASTOLIC BLOOD PRESSURE: 66 MMHG | BODY MASS INDEX: 31.74 KG/M2 | HEIGHT: 61 IN | WEIGHT: 168.13 LBS | SYSTOLIC BLOOD PRESSURE: 100 MMHG

## 2024-03-15 DIAGNOSIS — O09.90 SUPERVISION OF HIGH RISK PREGNANCY, UNSPECIFIED, UNSPECIFIED TRIMESTER: ICD-10-CM

## 2024-03-15 DIAGNOSIS — O09.899 SUPERVISION OF OTHER HIGH RISK PREGNANCIES, UNSPECIFIED TRIMESTER: ICD-10-CM

## 2024-03-15 DIAGNOSIS — O24.919 UNSPECIFIED DIABETES MELLITUS IN PREGNANCY, UNSPECIFIED TRIMESTER: ICD-10-CM

## 2024-03-15 LAB
BILIRUB UR QL STRIP: NORMAL
CLARITY UR: CLEAR
COLLECTION METHOD: NORMAL
GLUCOSE UR-MCNC: NORMAL
HCG UR QL: 0.2 EU/DL
HGB UR QL STRIP.AUTO: NORMAL
KETONES UR-MCNC: NORMAL
LEUKOCYTE ESTERASE UR QL STRIP: NORMAL
NITRITE UR QL STRIP: NORMAL
PH UR STRIP: 7
PROT UR STRIP-MCNC: NORMAL
SP GR UR STRIP: 1.02

## 2024-03-15 PROCEDURE — G0108: CPT

## 2024-03-15 PROCEDURE — G0108 DIAB MANAGE TRN  PER INDIV: CPT

## 2024-03-15 PROCEDURE — 99213 OFFICE O/P EST LOW 20 MIN: CPT | Mod: 25,GC

## 2024-03-15 PROCEDURE — G0463: CPT

## 2024-03-15 PROCEDURE — 81003 URINALYSIS AUTO W/O SCOPE: CPT

## 2024-03-29 ENCOUNTER — OUTPATIENT (OUTPATIENT)
Dept: OUTPATIENT SERVICES | Facility: HOSPITAL | Age: 39
LOS: 1 days | End: 2024-03-29
Payer: MEDICAID

## 2024-03-29 ENCOUNTER — APPOINTMENT (OUTPATIENT)
Dept: MATERNAL FETAL MEDICINE | Facility: CLINIC | Age: 39
End: 2024-03-29
Payer: MEDICAID

## 2024-03-29 ENCOUNTER — APPOINTMENT (OUTPATIENT)
Dept: ANTEPARTUM | Facility: CLINIC | Age: 39
End: 2024-03-29
Payer: MEDICAID

## 2024-03-29 ENCOUNTER — ASOB RESULT (OUTPATIENT)
Age: 39
End: 2024-03-29

## 2024-03-29 VITALS
SYSTOLIC BLOOD PRESSURE: 110 MMHG | OXYGEN SATURATION: 98 % | WEIGHT: 166.5 LBS | HEART RATE: 80 BPM | HEIGHT: 61 IN | BODY MASS INDEX: 31.43 KG/M2 | DIASTOLIC BLOOD PRESSURE: 70 MMHG

## 2024-03-29 DIAGNOSIS — Z3A.29 29 WEEKS GESTATION OF PREGNANCY: ICD-10-CM

## 2024-03-29 DIAGNOSIS — O34.219 MATERNAL CARE FOR UNSPECIFIED TYPE SCAR FROM PREVIOUS CESAREAN DELIVERY: ICD-10-CM

## 2024-03-29 DIAGNOSIS — O09.899 SUPERVISION OF OTHER HIGH RISK PREGNANCIES, UNSPECIFIED TRIMESTER: ICD-10-CM

## 2024-03-29 DIAGNOSIS — O36.63X0 MATERNAL CARE FOR EXCESSIVE FETAL GROWTH, THIRD TRIMESTER, NOT APPLICABLE OR UNSPECIFIED: ICD-10-CM

## 2024-03-29 DIAGNOSIS — O09.522 SUPERVISION OF ELDERLY MULTIGRAVIDA, SECOND TRIMESTER: ICD-10-CM

## 2024-03-29 DIAGNOSIS — Z36.4 ENCOUNTER FOR ANTENATAL SCREENING FOR FETAL GROWTH RETARDATION: ICD-10-CM

## 2024-03-29 DIAGNOSIS — O21.9 VOMITING OF PREGNANCY, UNSPECIFIED: ICD-10-CM

## 2024-03-29 LAB
BILIRUB UR QL STRIP: NORMAL
CLARITY UR: CLEAR
COLLECTION METHOD: NORMAL
GLUCOSE BLDC GLUCOMTR-MCNC: 119
GLUCOSE UR-MCNC: NORMAL
HCG UR QL: 0.2 EU/DL
HGB UR QL STRIP.AUTO: NORMAL
KETONES UR-MCNC: 40
LEUKOCYTE ESTERASE UR QL STRIP: NORMAL
NITRITE UR QL STRIP: NORMAL
PH UR STRIP: 7
PROT UR STRIP-MCNC: NORMAL
SP GR UR STRIP: 1.02

## 2024-03-29 PROCEDURE — 76816 OB US FOLLOW-UP PER FETUS: CPT | Mod: 26

## 2024-03-29 PROCEDURE — 76819 FETAL BIOPHYS PROFIL W/O NST: CPT

## 2024-03-29 PROCEDURE — 76816 OB US FOLLOW-UP PER FETUS: CPT

## 2024-03-29 PROCEDURE — G0108 DIAB MANAGE TRN  PER INDIV: CPT

## 2024-03-29 PROCEDURE — 99213 OFFICE O/P EST LOW 20 MIN: CPT | Mod: 25,GC

## 2024-03-29 PROCEDURE — 76819 FETAL BIOPHYS PROFIL W/O NST: CPT | Mod: 26,59

## 2024-03-29 PROCEDURE — G0463: CPT

## 2024-03-29 PROCEDURE — G0108: CPT

## 2024-03-29 PROCEDURE — 90471 IMMUNIZATION ADMIN: CPT

## 2024-03-29 PROCEDURE — 90715 TDAP VACCINE 7 YRS/> IM: CPT

## 2024-03-29 RX ORDER — BLOOD-GLUCOSE SENSOR
EACH MISCELLANEOUS
Qty: 1 | Refills: 3 | Status: ACTIVE | COMMUNITY
Start: 2023-11-17 | End: 1900-01-01

## 2024-04-01 ENCOUNTER — NON-APPOINTMENT (OUTPATIENT)
Age: 39
End: 2024-04-01

## 2024-04-02 ENCOUNTER — NON-APPOINTMENT (OUTPATIENT)
Age: 39
End: 2024-04-02

## 2024-04-02 ENCOUNTER — OUTPATIENT (OUTPATIENT)
Dept: OUTPATIENT SERVICES | Facility: HOSPITAL | Age: 39
LOS: 1 days | End: 2024-04-02
Payer: MEDICAID

## 2024-04-02 ENCOUNTER — APPOINTMENT (OUTPATIENT)
Dept: MATERNAL FETAL MEDICINE | Facility: CLINIC | Age: 39
End: 2024-04-02
Payer: MEDICAID

## 2024-04-02 VITALS
DIASTOLIC BLOOD PRESSURE: 60 MMHG | BODY MASS INDEX: 32.14 KG/M2 | HEIGHT: 61 IN | WEIGHT: 170.25 LBS | HEART RATE: 90 BPM | SYSTOLIC BLOOD PRESSURE: 104 MMHG | OXYGEN SATURATION: 98 %

## 2024-04-02 DIAGNOSIS — O24.119 PRE-EXISTING TYPE 2 DIABETES MELLITUS, IN PREGNANCY, UNSPECIFIED TRIMESTER: ICD-10-CM

## 2024-04-02 DIAGNOSIS — O09.529 SUPERVISION OF ELDERLY MULTIGRAVIDA, UNSPECIFIED TRIMESTER: ICD-10-CM

## 2024-04-02 DIAGNOSIS — O35.9XX0 MATERNAL CARE FOR (SUSPECTED) FETAL ABNORMALITY AND DAMAGE, UNSPECIFIED, NOT APPLICABLE OR UNSPECIFIED: ICD-10-CM

## 2024-04-02 DIAGNOSIS — O09.899 SUPERVISION OF OTHER HIGH RISK PREGNANCIES, UNSPECIFIED TRIMESTER: ICD-10-CM

## 2024-04-02 DIAGNOSIS — R82.71 BACTERIURIA: ICD-10-CM

## 2024-04-02 DIAGNOSIS — O09.90 SUPERVISION OF HIGH RISK PREGNANCY, UNSPECIFIED, UNSPECIFIED TRIMESTER: ICD-10-CM

## 2024-04-02 LAB
BILIRUB UR QL STRIP: NORMAL
CLARITY UR: CLEAR
COLLECTION METHOD: NORMAL
GLUCOSE UR-MCNC: NORMAL
HCG UR QL: 1 EU/DL
HGB UR QL STRIP.AUTO: NORMAL
KETONES UR-MCNC: NORMAL
LEUKOCYTE ESTERASE UR QL STRIP: NORMAL
NITRITE UR QL STRIP: NORMAL
PH UR STRIP: 7
PROT UR STRIP-MCNC: NORMAL
SP GR UR STRIP: 1.02

## 2024-04-02 PROCEDURE — 99213 OFFICE O/P EST LOW 20 MIN: CPT | Mod: GC,25

## 2024-04-02 PROCEDURE — G0463: CPT

## 2024-04-02 PROCEDURE — G0108: CPT

## 2024-04-02 PROCEDURE — 81003 URINALYSIS AUTO W/O SCOPE: CPT

## 2024-04-02 PROCEDURE — G0108 DIAB MANAGE TRN  PER INDIV: CPT

## 2024-04-02 RX ORDER — FAMOTIDINE 20 MG/1
20 TABLET, FILM COATED ORAL
Qty: 60 | Refills: 1 | Status: ACTIVE | COMMUNITY
Start: 2024-03-29 | End: 1900-01-01

## 2024-04-02 RX ORDER — ONDANSETRON 4 MG/1
4 TABLET, ORALLY DISINTEGRATING ORAL EVERY 8 HOURS
Qty: 30 | Refills: 1 | Status: ACTIVE | COMMUNITY
Start: 2024-03-29 | End: 1900-01-01

## 2024-04-06 DIAGNOSIS — O21.9 VOMITING OF PREGNANCY, UNSPECIFIED: ICD-10-CM

## 2024-04-06 DIAGNOSIS — Z23 ENCOUNTER FOR IMMUNIZATION: ICD-10-CM

## 2024-04-06 DIAGNOSIS — O09.90 SUPERVISION OF HIGH RISK PREGNANCY, UNSPECIFIED, UNSPECIFIED TRIMESTER: ICD-10-CM

## 2024-04-11 ENCOUNTER — NON-APPOINTMENT (OUTPATIENT)
Age: 39
End: 2024-04-11

## 2024-04-12 ENCOUNTER — APPOINTMENT (OUTPATIENT)
Dept: MATERNAL FETAL MEDICINE | Facility: CLINIC | Age: 39
End: 2024-04-12

## 2024-04-15 ENCOUNTER — APPOINTMENT (OUTPATIENT)
Dept: CV DIAGNOSITCS | Facility: HOSPITAL | Age: 39
End: 2024-04-15

## 2024-04-19 ENCOUNTER — APPOINTMENT (OUTPATIENT)
Dept: MATERNAL FETAL MEDICINE | Facility: CLINIC | Age: 39
End: 2024-04-19

## 2024-04-25 ENCOUNTER — APPOINTMENT (OUTPATIENT)
Dept: CARDIOLOGY | Facility: CLINIC | Age: 39
End: 2024-04-25
Payer: MEDICAID

## 2024-04-25 ENCOUNTER — APPOINTMENT (OUTPATIENT)
Dept: PEDIATRIC UROLOGY | Facility: CLINIC | Age: 39
End: 2024-04-25
Payer: MEDICAID

## 2024-04-25 DIAGNOSIS — R06.09 OTHER FORMS OF DYSPNEA: ICD-10-CM

## 2024-04-25 DIAGNOSIS — Z82.49 FAMILY HISTORY OF ISCHEMIC HEART DISEASE AND OTHER DISEASES OF THE CIRCULATORY SYSTEM: ICD-10-CM

## 2024-04-25 DIAGNOSIS — Z83.3 FAMILY HISTORY OF DIABETES MELLITUS: ICD-10-CM

## 2024-04-25 DIAGNOSIS — Z13.6 ENCOUNTER FOR SCREENING FOR CARDIOVASCULAR DISORDERS: ICD-10-CM

## 2024-04-25 DIAGNOSIS — Q61.4 RENAL DYSPLASIA: ICD-10-CM

## 2024-04-25 PROCEDURE — 99204 OFFICE O/P NEW MOD 45 MIN: CPT

## 2024-04-25 PROCEDURE — 99203 OFFICE O/P NEW LOW 30 MIN: CPT

## 2024-04-25 RX ORDER — TAMSULOSIN HYDROCHLORIDE 0.4 MG/1
0.4 CAPSULE ORAL
Qty: 14 | Refills: 0 | Status: DISCONTINUED | COMMUNITY
Start: 2023-04-25 | End: 2024-04-25

## 2024-04-25 RX ORDER — CLOTRIMAZOLE 10 MG/G
1 CREAM VAGINAL
Qty: 7 | Refills: 0 | Status: DISCONTINUED | COMMUNITY
Start: 2021-04-02 | End: 2024-04-25

## 2024-04-25 RX ORDER — AMOXICILLIN 500 MG/1
500 TABLET, FILM COATED ORAL EVERY 8 HOURS
Qty: 21 | Refills: 0 | Status: DISCONTINUED | COMMUNITY
Start: 2023-11-17 | End: 2024-04-25

## 2024-04-25 RX ORDER — METFORMIN HYDROCHLORIDE 500 MG/1
500 TABLET, COATED ORAL
Qty: 90 | Refills: 0 | Status: DISCONTINUED | COMMUNITY
Start: 2022-04-29 | End: 2024-04-25

## 2024-04-25 RX ORDER — FAMOTIDINE 10 MG/1
10 TABLET, FILM COATED ORAL
Qty: 60 | Refills: 0 | Status: DISCONTINUED | COMMUNITY
Start: 2021-07-30 | End: 2024-04-25

## 2024-04-25 NOTE — DISCUSSION/SUMMARY
[FreeTextEntry1] :  : [id number]  In summary, Ms. KIERSTEN PIÑA is a 38 year - old F  GA 28 weeks carries an FH of HTN and premature heart disease ( brother  s/p MI @ 33 yrs of age ) and dad with T2DM and personal  history of gestational DM on insulin  is here 2024 to establish care in the Women's heart health program.  OB history: 2016 - FT -  section due to fetal distress  : FT - repeat  section  : Current pregnancy   # T2DM : Reccs per MFM  Encouraged patient to continue healthy exercise and eating habits, focusing on a Mediterranean style of eating and aiming for the recommended 150 minutes per week of moderate physical activity.  # Advised to call office with BP log in 2 week and call immediately if BP > 140/80 Encouraged Patient to monitor BP at home, keep a log, and report results back to us for evaluation.  Additionally, encouraged a heart-healthy diet and exercise as tolerated. Echocardiogram to assess the structural and valvular function.

## 2024-04-25 NOTE — DISCUSSION/SUMMARY
[FreeTextEntry1] :  : [id number]  In summary, Ms. KIERSTEN PIÑA is a 38 year - old F  GA 28 weeks carries an FH of HTN and premature heart disease ( brother  s/p MI @ 33 yrs of age ) and dad with T2DM and personal  history of gestational DM on insulin  is here 2024 to establish care in the Women's heart health program.  OB history: 2016 - FT -  section due to fetal distress  : FT - repeat  section  : Current pregnancy   # T2DM : Reccs per MFM  Encouraged patient to continue healthy exercise and eating habits, focusing on a Mediterranean style of eating and aiming for the recommended 150 minutes per week of moderate physical activity.  # Advised to call office with BP log in 2 week and call immediately if BP > 140/80 Encouraged Patient to monitor BP at home, keep a log, and report results back to us for evaluation.  Additionally, encouraged a heart-healthy diet and exercise as tolerated. Echocardiogram to assess the structural and valvular function.

## 2024-04-25 NOTE — HISTORY OF PRESENT ILLNESS
[FreeTextEntry1] :  : [id number]  Ms. KIERSTEN PIÑA 38 year - old F  GA 28 weeks carries a personal history of T2DM on insulin  is here 2024 to establish care in the [facility] program. Patient carries a FH of HTN and premature heart disease ( brother  s/p MI @ 33 yrs of age ) and DM ( dad ). presently denies chest pain, shortness of breath, dizziness, lightheadedness, palpitations or near syncope or syncope, orthopnea, PND and increasing lower extremity edema.  OB history: 2016 - FT -  section due to fetal distress  : FT - repeat  section  : Current pregnancy   # T2DM : Reccs per MFM  Encouraged patient to continue healthy exercise and eating habits, focusing on a Mediterranean style of eating and aiming for the recommended 150 minutes per week of moderate physical activity.  # Advised to call office with BP log in 2 week and call immediately if BP > 140/80 Encouraged Patient to monitor BP at home, keep a log, and report results back to us for evaluation.  Additionally, encouraged a heart-healthy diet and exercise as tolerated. Echocardiogram to assess the structural and valvular function.

## 2024-04-25 NOTE — HISTORY OF PRESENT ILLNESS
[FreeTextEntry1] :  : [id number]  Ms. KIERSTEN PIÑA 38 year - old F  GA 28 weeks carries a personal history of T2DM on insulin  is here 2024 to establish care in the Women's heart health program. Patient carries a FH of HTN and premature heart disease ( brother  s/p MI @ 33 yrs of age ) and DM ( dad ). presently denies chest pain, shortness of breath, dizziness, lightheadedness, palpitations or near syncope or syncope, orthopnea, PND and increasing lower extremity edema.  OB history: 2016 - FT -  section due to fetal distress  : FT - repeat  section  : Current pregnancy   # T2DM : Reccs per MFM  Encouraged patient to continue healthy exercise and eating habits, focusing on a Mediterranean style of eating and aiming for the recommended 150 minutes per week of moderate physical activity.  # Advised to call office with BP log in 2 week and call immediately if BP > 140/80 Encouraged Patient to monitor BP at home, keep a log, and report results back to us for evaluation.  Additionally, encouraged a heart-healthy diet and exercise as tolerated. Echocardiogram to assess the structural and valvular function.

## 2024-04-26 ENCOUNTER — LABORATORY RESULT (OUTPATIENT)
Age: 39
End: 2024-04-26

## 2024-04-26 ENCOUNTER — NON-APPOINTMENT (OUTPATIENT)
Age: 39
End: 2024-04-26

## 2024-04-26 ENCOUNTER — APPOINTMENT (OUTPATIENT)
Dept: MATERNAL FETAL MEDICINE | Facility: CLINIC | Age: 39
End: 2024-04-26
Payer: MEDICAID

## 2024-04-26 ENCOUNTER — APPOINTMENT (OUTPATIENT)
Dept: ANTEPARTUM | Facility: CLINIC | Age: 39
End: 2024-04-26
Payer: MEDICAID

## 2024-04-26 ENCOUNTER — ASOB RESULT (OUTPATIENT)
Age: 39
End: 2024-04-26

## 2024-04-26 ENCOUNTER — OUTPATIENT (OUTPATIENT)
Dept: OUTPATIENT SERVICES | Facility: HOSPITAL | Age: 39
LOS: 1 days | End: 2024-04-26
Payer: MEDICAID

## 2024-04-26 VITALS
DIASTOLIC BLOOD PRESSURE: 60 MMHG | BODY MASS INDEX: 32.66 KG/M2 | SYSTOLIC BLOOD PRESSURE: 98 MMHG | OXYGEN SATURATION: 97 % | HEART RATE: 86 BPM | WEIGHT: 173 LBS | HEIGHT: 61 IN

## 2024-04-26 DIAGNOSIS — O09.899 SUPERVISION OF OTHER HIGH RISK PREGNANCIES, UNSPECIFIED TRIMESTER: ICD-10-CM

## 2024-04-26 PROCEDURE — 85027 COMPLETE CBC AUTOMATED: CPT

## 2024-04-26 PROCEDURE — 83690 ASSAY OF LIPASE: CPT

## 2024-04-26 PROCEDURE — 76818 FETAL BIOPHYS PROFILE W/NST: CPT | Mod: 26,59

## 2024-04-26 PROCEDURE — 76816 OB US FOLLOW-UP PER FETUS: CPT | Mod: 26

## 2024-04-26 PROCEDURE — 99213 OFFICE O/P EST LOW 20 MIN: CPT | Mod: GC,25

## 2024-04-26 PROCEDURE — 80053 COMPREHEN METABOLIC PANEL: CPT

## 2024-04-26 PROCEDURE — G0108 DIAB MANAGE TRN  PER INDIV: CPT

## 2024-04-26 PROCEDURE — 36415 COLL VENOUS BLD VENIPUNCTURE: CPT

## 2024-04-26 PROCEDURE — 76816 OB US FOLLOW-UP PER FETUS: CPT

## 2024-04-26 PROCEDURE — 76818 FETAL BIOPHYS PROFILE W/NST: CPT

## 2024-04-26 RX ORDER — PANTOPRAZOLE 40 MG/1
40 TABLET, DELAYED RELEASE ORAL DAILY
Qty: 30 | Refills: 1 | Status: ACTIVE | COMMUNITY
Start: 2024-04-26 | End: 1900-01-01

## 2024-04-26 RX ORDER — INSULIN GLARGINE 100 [IU]/ML
100 INJECTION, SOLUTION SUBCUTANEOUS
Qty: 1 | Refills: 0 | Status: ACTIVE | COMMUNITY
Start: 2023-11-10 | End: 1900-01-01

## 2024-04-27 LAB
ALBUMIN SERPL ELPH-MCNC: 3.7 G/DL — SIGNIFICANT CHANGE UP (ref 3.3–5)
ALP SERPL-CCNC: 146 U/L — HIGH (ref 40–120)
ALT FLD-CCNC: 6 U/L — LOW (ref 10–45)
ANION GAP SERPL CALC-SCNC: 9 MMOL/L — SIGNIFICANT CHANGE UP (ref 5–17)
AST SERPL-CCNC: 12 U/L — SIGNIFICANT CHANGE UP (ref 10–40)
BILIRUB SERPL-MCNC: 0.3 MG/DL — SIGNIFICANT CHANGE UP (ref 0.2–1.2)
BUN SERPL-MCNC: 3 MG/DL — LOW (ref 7–23)
CALCIUM SERPL-MCNC: 8.9 MG/DL — SIGNIFICANT CHANGE UP (ref 8.4–10.5)
CHLORIDE SERPL-SCNC: 106 MMOL/L — SIGNIFICANT CHANGE UP (ref 96–108)
CO2 SERPL-SCNC: 23 MMOL/L — SIGNIFICANT CHANGE UP (ref 22–31)
CREAT SERPL-MCNC: 0.57 MG/DL — SIGNIFICANT CHANGE UP (ref 0.5–1.3)
EGFR: 119 ML/MIN/1.73M2 — SIGNIFICANT CHANGE UP
GLUCOSE SERPL-MCNC: 97 MG/DL — SIGNIFICANT CHANGE UP (ref 70–99)
HCT VFR BLD CALC: 37.6 % — SIGNIFICANT CHANGE UP (ref 34.5–45)
HGB BLD-MCNC: 12 G/DL — SIGNIFICANT CHANGE UP (ref 11.5–15.5)
LIDOCAIN IGE QN: 25 U/L — SIGNIFICANT CHANGE UP (ref 13–60)
MCHC RBC-ENTMCNC: 28.4 PG — SIGNIFICANT CHANGE UP (ref 27–34)
MCHC RBC-ENTMCNC: 31.9 GM/DL — LOW (ref 32–36)
MCV RBC AUTO: 89.1 FL — SIGNIFICANT CHANGE UP (ref 80–100)
PLATELET # BLD AUTO: 202 K/UL — SIGNIFICANT CHANGE UP (ref 150–400)
POTASSIUM SERPL-MCNC: 5.2 MMOL/L — SIGNIFICANT CHANGE UP (ref 3.5–5.3)
POTASSIUM SERPL-SCNC: 5.2 MMOL/L — SIGNIFICANT CHANGE UP (ref 3.5–5.3)
PROT SERPL-MCNC: 6.3 G/DL — SIGNIFICANT CHANGE UP (ref 6–8.3)
RBC # BLD: 4.22 M/UL — SIGNIFICANT CHANGE UP (ref 3.8–5.2)
RBC # FLD: 13.6 % — SIGNIFICANT CHANGE UP (ref 10.3–14.5)
SODIUM SERPL-SCNC: 138 MMOL/L — SIGNIFICANT CHANGE UP (ref 135–145)
WBC # BLD: 8.27 K/UL — SIGNIFICANT CHANGE UP (ref 3.8–10.5)
WBC # FLD AUTO: 8.27 K/UL — SIGNIFICANT CHANGE UP (ref 3.8–10.5)

## 2024-04-29 ENCOUNTER — APPOINTMENT (OUTPATIENT)
Dept: ULTRASOUND IMAGING | Facility: CLINIC | Age: 39
End: 2024-04-29

## 2024-04-29 PROBLEM — Q61.4 MULTICYSTIC DYSPLASTIC KIDNEY (MCDK): Status: ACTIVE | Noted: 2024-04-29

## 2024-04-29 NOTE — REASON FOR VISIT
[Home] : at home, [unfilled] , at the time of the visit. [Medical Office: ([facility])___] : at the medical office located in  [Initial Consultation] : an initial consultation [TextBox_50] : prenatal consultaiton

## 2024-04-29 NOTE — ASSESSMENT
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  I discussed the anatomy using diagrams as well as the possible causes that will be determined after birth and the possible testing.  I recommended maintaining the pregnancy and avoiding early delivery given normal levels of amniotic fluid.   The  care will include:  1. ultrasound while in hospital if possible, otherwise 1st renal-bladder sonogram will take place at the office visit at about 2 weeks of age. After better defining the anomaly, we can then proceed with any additional testing or procedures. I did outline these in general terms.      All questions were answered. We will reconvene prior to the delivery or at the 2 week visit after the baby is born.

## 2024-04-29 NOTE — HISTORY OF PRESENT ILLNESS
[TextBox_4] : I verified the identity of the patient and the reason for the appointment with the parent. I explained to the parent that telemedicine encounters are not the same as a direct patient/healthcare provider visit because the patient and healthcare provider are not in the same room, which can result in limitations, including with the physical examination. I explained that the telemedicine encounter may require the patients genitalia to be shown. I explained that after the telemedicine encounter, the patient may require an office visit for an in-person physical examination, ultrasound, or other testing. I informed the parent that there may be privacy risks associated with the use of the technology and that there may be costs associated with the encounter. I offered the option of an office visit rather than a telemedicine encounter. Parent stated that all explanations were understood, and that all questions were answered to their satisfaction. The parent verbalized their preference and consent to proceed with the telemedicine encounter.    KIERSTEN is here for an initial consultation.  She was referred by maternal fetal medicine.  She is 36 weeks pregnant with her THIRD pregnancy conceived naturally.   She has ONE CHILD at home.   Left MCDK was detected in utero at 20 weeks and has remained stable thus far. The bladder has been noted to be empty on ultrasound and the amniotic fluid levels are normal. No other anomalies have been detected.

## 2024-04-30 ENCOUNTER — NON-APPOINTMENT (OUTPATIENT)
Age: 39
End: 2024-04-30

## 2024-05-03 ENCOUNTER — APPOINTMENT (OUTPATIENT)
Dept: ANTEPARTUM | Facility: CLINIC | Age: 39
End: 2024-05-03

## 2024-05-03 ENCOUNTER — APPOINTMENT (OUTPATIENT)
Dept: MATERNAL FETAL MEDICINE | Facility: CLINIC | Age: 39
End: 2024-05-03

## 2024-05-03 ENCOUNTER — NON-APPOINTMENT (OUTPATIENT)
Age: 39
End: 2024-05-03

## 2024-05-03 NOTE — OB PROVIDER DELIVERY SUMMARY - NSPPHRISKEVAL_OBGYN_ALL_OB
Need for Home Care Service was communicated by MICHAEL WHITNEY RN on 5/3/24.       Spoke with JEFF MARSHALL PT  regarding need for home care. Patient is agreeable to home care services. The patient will benefit from home care services of RN/PT (disciplines, skilled need)    Risk of readmission is : NA (based on Longitudinal Plan of Care score)    Homebound criteria was discussed in detail and JEFF MARSHALL PT  verbalizes understanding.   Patient meets homebound criteria because Maximum assistance required following joint surgery.. Verbal and written information provided to JEFF MARSHALL PT  along with [facility] contact information. Patient/caregiver understands that home care is an intermittent level of care and visits may not occur daily. Patient/caregiver is teachable and willing to learn WOUND CARE.  Teach back demonstrated by patient.     Address, phone number, email, and insurance verified with JEFF MARSHALL PT  as per [facility] records. Best contact number is [phone] (Mobile).  Patient YES does have active [place] Portal.  Patient does have video visit capability YES (tablet, smartphone, or computer with a video camera).  Patient's support system is [employer] (SIGNIFICANT OTHER  (name of support system)    Active with a skilled home health agency prior to admission? NO (Yes/No).   Verified with [facility] that following provider is DR VALE CASTRO and willing to follow for home care.    Liaison will continue to follow until discharge.   Anticipated dc date is 5/2/24.     CA (signature)     BMI >40/Prior , uterine surgery, or multiple laparotomies

## 2024-05-04 DIAGNOSIS — O36.63X0 MATERNAL CARE FOR EXCESSIVE FETAL GROWTH, THIRD TRIMESTER, NOT APPLICABLE OR UNSPECIFIED: ICD-10-CM

## 2024-05-04 DIAGNOSIS — O34.219 MATERNAL CARE FOR UNSPECIFIED TYPE SCAR FROM PREVIOUS CESAREAN DELIVERY: ICD-10-CM

## 2024-05-04 DIAGNOSIS — Z3A.33 33 WEEKS GESTATION OF PREGNANCY: ICD-10-CM

## 2024-05-04 DIAGNOSIS — O99.212 OBESITY COMPLICATING PREGNANCY, SECOND TRIMESTER: ICD-10-CM

## 2024-05-04 DIAGNOSIS — O24.113 PRE-EXISTING TYPE 2 DIABETES MELLITUS, IN PREGNANCY, THIRD TRIMESTER: ICD-10-CM

## 2024-05-06 ENCOUNTER — NON-APPOINTMENT (OUTPATIENT)
Age: 39
End: 2024-05-06

## 2024-05-07 ENCOUNTER — ASOB RESULT (OUTPATIENT)
Age: 39
End: 2024-05-07

## 2024-05-07 ENCOUNTER — NON-APPOINTMENT (OUTPATIENT)
Age: 39
End: 2024-05-07

## 2024-05-07 ENCOUNTER — APPOINTMENT (OUTPATIENT)
Dept: PEDIATRIC CARDIOLOGY | Facility: CLINIC | Age: 39
End: 2024-05-07
Payer: MEDICAID

## 2024-05-07 ENCOUNTER — APPOINTMENT (OUTPATIENT)
Dept: ANTEPARTUM | Facility: CLINIC | Age: 39
End: 2024-05-07
Payer: MEDICAID

## 2024-05-07 ENCOUNTER — APPOINTMENT (OUTPATIENT)
Dept: MATERNAL FETAL MEDICINE | Facility: CLINIC | Age: 39
End: 2024-05-07

## 2024-05-07 PROCEDURE — 76818 FETAL BIOPHYS PROFILE W/NST: CPT

## 2024-05-07 PROCEDURE — 76828 ECHO EXAM OF FETAL HEART: CPT

## 2024-05-07 PROCEDURE — 76818 FETAL BIOPHYS PROFILE W/NST: CPT | Mod: 26

## 2024-05-07 PROCEDURE — 99215 OFFICE O/P EST HI 40 MIN: CPT | Mod: 25

## 2024-05-07 PROCEDURE — 93325 DOPPLER ECHO COLOR FLOW MAPG: CPT

## 2024-05-07 PROCEDURE — 76826 ECHO EXAM OF FETAL HEART: CPT

## 2024-05-09 ENCOUNTER — NON-APPOINTMENT (OUTPATIENT)
Age: 39
End: 2024-05-09

## 2024-05-09 DIAGNOSIS — O35.BXX0 MATERNAL CARE FOR OTHER (SUSPECTED) FETAL ABNORMALITY AND DAMAGE, FETAL CARDIAC ANOMALIES, NOT APPLICABLE OR UNSPECIFIED: ICD-10-CM

## 2024-05-10 ENCOUNTER — ASOB RESULT (OUTPATIENT)
Age: 39
End: 2024-05-10

## 2024-05-10 ENCOUNTER — APPOINTMENT (OUTPATIENT)
Dept: ANTEPARTUM | Facility: CLINIC | Age: 39
End: 2024-05-10
Payer: MEDICAID

## 2024-05-10 ENCOUNTER — APPOINTMENT (OUTPATIENT)
Dept: MATERNAL FETAL MEDICINE | Facility: CLINIC | Age: 39
End: 2024-05-10
Payer: MEDICAID

## 2024-05-10 ENCOUNTER — OUTPATIENT (OUTPATIENT)
Dept: OUTPATIENT SERVICES | Facility: HOSPITAL | Age: 39
LOS: 1 days | End: 2024-05-10
Payer: MEDICAID

## 2024-05-10 VITALS
OXYGEN SATURATION: 98 % | BODY MASS INDEX: 33.65 KG/M2 | WEIGHT: 178.25 LBS | DIASTOLIC BLOOD PRESSURE: 77 MMHG | HEART RATE: 71 BPM | SYSTOLIC BLOOD PRESSURE: 120 MMHG | HEIGHT: 61 IN

## 2024-05-10 DIAGNOSIS — O09.899 SUPERVISION OF OTHER HIGH RISK PREGNANCIES, UNSPECIFIED TRIMESTER: ICD-10-CM

## 2024-05-10 DIAGNOSIS — O24.919 UNSPECIFIED DIABETES MELLITUS IN PREGNANCY, UNSPECIFIED TRIMESTER: ICD-10-CM

## 2024-05-10 DIAGNOSIS — Z3A.35 35 WEEKS GESTATION OF PREGNANCY: ICD-10-CM

## 2024-05-10 DIAGNOSIS — O09.90 SUPERVISION OF HIGH RISK PREGNANCY, UNSPECIFIED, UNSPECIFIED TRIMESTER: ICD-10-CM

## 2024-05-10 DIAGNOSIS — O24.113 PRE-EXISTING TYPE 2 DIABETES MELLITUS, IN PREGNANCY, THIRD TRIMESTER: ICD-10-CM

## 2024-05-10 DIAGNOSIS — O09.522 SUPERVISION OF ELDERLY MULTIGRAVIDA, SECOND TRIMESTER: ICD-10-CM

## 2024-05-10 DIAGNOSIS — O35.8XX0 MATERNAL CARE FOR OTHER (SUSPECTED) FETAL ABNORMALITY AND DAMAGE, NOT APPLICABLE OR UNSPECIFIED: ICD-10-CM

## 2024-05-10 DIAGNOSIS — O34.219 MATERNAL CARE FOR UNSPECIFIED TYPE SCAR FROM PREVIOUS CESAREAN DELIVERY: ICD-10-CM

## 2024-05-10 PROCEDURE — 76818 FETAL BIOPHYS PROFILE W/NST: CPT

## 2024-05-10 PROCEDURE — 76818 FETAL BIOPHYS PROFILE W/NST: CPT | Mod: 26

## 2024-05-10 PROCEDURE — G0108: CPT

## 2024-05-10 PROCEDURE — 81003 URINALYSIS AUTO W/O SCOPE: CPT

## 2024-05-10 PROCEDURE — G0463: CPT

## 2024-05-10 PROCEDURE — G0108 DIAB MANAGE TRN  PER INDIV: CPT

## 2024-05-10 PROCEDURE — 99213 OFFICE O/P EST LOW 20 MIN: CPT | Mod: GC,25

## 2024-05-16 NOTE — ED ADULT TRIAGE NOTE - CCCP TRG CHIEF CMPLNT
5/16/2024         RE: Viktoriya Goldstein  [address]  [place] [facility] [address]        Dear Colleague,    Thank you for referring your patient, Viktoriya Goldstein, to the [facility] [place]. Please see a copy of my visit note below.    [facility]    Reason for Visit  Viktoriya Goldstein is a 51 year old year old female who is being seen for follow up on asthma.   HPI  Patient is 51 year-old female Kazakh speaking [no interpretation was needed], presents for follow-up of moderate persistent asthma (dx [place] 2008) , chronic sinusitis, hyposomnia (started 2020, seems to have resolved with nucala) and aspergillus allergy.     Briefly, she was first diagnosed in [place] around 2008 and presenting symptoms at the time were productive cough w/green sputum, wheezing, and shortness of breath. Her symptoms come in attacks and seem to be related to humidity, dust, and activity.  She has seen allergy clinic and aspergillus allergy was diagnosed, and advised to move out of her previous apartment which had significant amounts of mold.  She has moved around 2 weeks ago to a new apartment and her symptoms have markedly improved.  She has associated chronic sinusitis and receiving mometasone nasal spray with partial relief. She lost her sense of smell around 3 years ago, but seems to have recovered it with nucala which was started in 1/2023 for asthma.     She returned from a visit to [place] around 3 weeks ago, after she spent one month with family. Multiple family members had cough and high volume, green sputum production, and she thinks she contracted an infection. No hemoptysis. She was seen by a physician in [place] and received amoxicillin course as well as short prednisone course with partial relief in symptoms, but symptoms recurred after completion of these medications. On 3/17 and after [place] back at the [place], she reached  headache out to us and we re-prescribed a longer prednisone course, and again her symptoms improved then got worse after completion. She was also prescribed a second amoxicillin course in the [place]. She has associated fever, rigors and sweats with some fever spikes. She is compliant with monthly nucala and last dose was 3/21. She thinks it got her sense of smell back. She is currently using advair two puffs instead of 1, and twice a day. Her covid status is negative.     Updates from 11/2/23:  Viktoriya returns for follow up, continues to have productive cough on daily basis. Using Advair 250 as PRN, and some days not taking it when she feels slightly better. No recent exacerbations requiring ED or urgent care visits. She is bothered by the dryness associated with heating in the winter. She does have a humidifier but hasn't used it this winter yet.     Updates from today 5/16/24:  Viktoriya returns today for asthma follow up. She is taking advair 230, 1 puff BID, and continues to avoid triggers however she is suffering with near daily symptoms, and nightly symptoms several times per week. She ran out of her rescue albuterol inhaler recently as she needs it several times a day sometimes. She has ongoing cough productive of green sputum that is hard to get out, and has not used aerobika device from last year for at least several months. We have stopped nucala approximately 8-10 months ago as she wanted to see how she can do off of it, and she thinks she is back to her uncontrolled asthma state from before starting nucala. No recent ED visits or admissions.     Vitals: /78   Pulse 75   SpO2 96%         Exam:   GENERAL APPEARANCE: Well developed, well nourished, alert, and in no apparent distress.  RESP: Markedly reduced air movement, biphasic wheezing bilaterally   CV: Normal S1, S2, regular rhythm, normal rate. No murmur.   MS: extremities normal. No clubbing. No cyanosis.  SKIN: no rash on limited exam.  NEURO: Mentation  intact, speech normal, normal gait and stance.  PSYCH: mentation appears normal. and affect normal/bright.    Results:  PFTs performed 2023      Spirometry 24:        Chest imagin2022 - CXR performed today images reviewed, no acute chest findings. Clear lung fields.       Assessment and plan:    Severe persistent asthma   Patient is 51 year-old female with known history of asthma since  with difficult to control asthma. Her main issue is compliance with advair which re-discussed today, and very worried about systemic steroids side effects so has been reluctant to do a short course prednisone during outpatients flares. Her advair compliance seems to have improved, as she is confirming scheduled BID regimen (only puff BID rather than 2 due to muscular spasm side effect noted at 2 BID), and also her FeNO today is suppressed which speaks of active ICS. However, she continues to have daily symptoms and also her spirometry is concerning with ratio of 50 and FEV1 48%, down from 84% 2 years ago. She was on nucala for about 6 months with marked improvement in symptoms and less use of rescue inhaler however she wanted to see how she does off of it so she stopped it around 2023. Sputum culture showed no growth 2023. CT chest showed small area of RML infiltrate that might represent early NTM infection but she is very reluctant to undergo bronchoscopy when explained previously and I don't think it is strongly indicated.   Plan:  - Continue advair, 230, two puffs BID with spacer   - Rx Spiriva respimat 2.5 mcg, two puffs qAM  - Spiriva use education provided  - Aerobika device provided with education, and asked patient to demonstrate how to do it which she did correctly   - Tessalon 200 mg po BID PRN   - Continue singulair 10 mg po at bedtime   - Renew advair and albuterol   - CBC with diff today, resulted with Eos 800  - Restart Nucala 100 mg monthly indefinitely   - Return to clinic in 4 months with  spirometry and FeNO       Patient understood my instructions clearly and re-explained them back to me without .       2. Chronic sinusitis with hyposmia   Recovered after nucala last year.  Follows with ENT and allergy       Return in 4 months         Daljit Mann M.D.    Total time spent on the day of the encounter was 55 minutes

## 2024-05-17 ENCOUNTER — NON-APPOINTMENT (OUTPATIENT)
Age: 39
End: 2024-05-17

## 2024-05-17 ENCOUNTER — APPOINTMENT (OUTPATIENT)
Dept: MATERNAL FETAL MEDICINE | Facility: CLINIC | Age: 39
End: 2024-05-17
Payer: MEDICAID

## 2024-05-17 ENCOUNTER — APPOINTMENT (OUTPATIENT)
Dept: ANTEPARTUM | Facility: CLINIC | Age: 39
End: 2024-05-17
Payer: MEDICAID

## 2024-05-17 ENCOUNTER — LABORATORY RESULT (OUTPATIENT)
Age: 39
End: 2024-05-17

## 2024-05-17 ENCOUNTER — OUTPATIENT (OUTPATIENT)
Dept: OUTPATIENT SERVICES | Facility: HOSPITAL | Age: 39
LOS: 1 days | End: 2024-05-17
Payer: MEDICAID

## 2024-05-17 ENCOUNTER — ASOB RESULT (OUTPATIENT)
Age: 39
End: 2024-05-17

## 2024-05-17 VITALS
WEIGHT: 179 LBS | BODY MASS INDEX: 33.79 KG/M2 | DIASTOLIC BLOOD PRESSURE: 75 MMHG | HEART RATE: 89 BPM | OXYGEN SATURATION: 98 % | SYSTOLIC BLOOD PRESSURE: 123 MMHG | HEIGHT: 61 IN

## 2024-05-17 DIAGNOSIS — O09.90 SUPERVISION OF HIGH RISK PREGNANCY, UNSPECIFIED, UNSPECIFIED TRIMESTER: ICD-10-CM

## 2024-05-17 DIAGNOSIS — O09.899 SUPERVISION OF OTHER HIGH RISK PREGNANCIES, UNSPECIFIED TRIMESTER: ICD-10-CM

## 2024-05-17 DIAGNOSIS — O24.119 PRE-EXISTING TYPE 2 DIABETES MELLITUS, TYPE 2, IN PREGNANCY, UNSPECIFIED TRIMESTER: ICD-10-CM

## 2024-05-17 DIAGNOSIS — O35.9XX0 MATERNAL CARE FOR (SUSPECTED) FETAL ABNORMALITY AND DAMAGE, UNSPECIFIED, NOT APPLICABLE OR UNSPECIFIED: ICD-10-CM

## 2024-05-17 LAB
BILIRUB UR QL STRIP: NORMAL
CLARITY UR: CLEAR
COLLECTION METHOD: NORMAL
GLUCOSE UR-MCNC: NORMAL
HCG UR QL: 0.2 EU/DL
HGB UR QL STRIP.AUTO: NORMAL
HIV 1+2 AB+HIV1 P24 AG SERPL QL IA: SIGNIFICANT CHANGE UP
KETONES UR-MCNC: NORMAL
LEUKOCYTE ESTERASE UR QL STRIP: NORMAL
NITRITE UR QL STRIP: NORMAL
PH UR STRIP: 7
PROT UR STRIP-MCNC: NORMAL
SP GR UR STRIP: 1.02

## 2024-05-17 PROCEDURE — 76818 FETAL BIOPHYS PROFILE W/NST: CPT | Mod: 26

## 2024-05-17 PROCEDURE — G0108 DIAB MANAGE TRN  PER INDIV: CPT

## 2024-05-17 PROCEDURE — G0463: CPT

## 2024-05-17 PROCEDURE — 86780 TREPONEMA PALLIDUM: CPT

## 2024-05-17 PROCEDURE — G0108: CPT

## 2024-05-17 PROCEDURE — 87389 HIV-1 AG W/HIV-1&-2 AB AG IA: CPT

## 2024-05-17 PROCEDURE — 36415 COLL VENOUS BLD VENIPUNCTURE: CPT

## 2024-05-17 PROCEDURE — 81003 URINALYSIS AUTO W/O SCOPE: CPT

## 2024-05-17 PROCEDURE — 99213 OFFICE O/P EST LOW 20 MIN: CPT | Mod: GC,25

## 2024-05-18 LAB — T PALLIDUM AB TITR SER: NEGATIVE — SIGNIFICANT CHANGE UP

## 2024-05-19 PROBLEM — O24.119 TYPE 2 DIABETES MELLITUS AFFECTING PREGNANCY, ANTEPARTUM: Status: ACTIVE | Noted: 2024-02-08

## 2024-05-19 PROBLEM — O35.9XX0 FETAL ABNORMALITY AFFECTING MANAGEMENT OF MOTHER, SINGLE OR UNSPECIFIED FETUS: Status: ACTIVE | Noted: 2024-01-05

## 2024-05-19 PROBLEM — O09.90 SUPERVISION OF HIGH RISK PREGNANCY, ANTEPARTUM: Status: ACTIVE | Noted: 2023-11-22

## 2024-05-20 ENCOUNTER — NON-APPOINTMENT (OUTPATIENT)
Age: 39
End: 2024-05-20

## 2024-05-20 DIAGNOSIS — O09.90 SUPERVISION OF HIGH RISK PREGNANCY, UNSPECIFIED, UNSPECIFIED TRIMESTER: ICD-10-CM

## 2024-05-20 DIAGNOSIS — O35.9XX0 MATERNAL CARE FOR (SUSPECTED) FETAL ABNORMALITY AND DAMAGE, UNSPECIFIED, NOT APPLICABLE OR UNSPECIFIED: ICD-10-CM

## 2024-05-20 DIAGNOSIS — O24.119 PRE-EXISTING TYPE 2 DIABETES MELLITUS, IN PREGNANCY, UNSPECIFIED TRIMESTER: ICD-10-CM

## 2024-05-21 ENCOUNTER — APPOINTMENT (OUTPATIENT)
Dept: ANTEPARTUM | Facility: CLINIC | Age: 39
End: 2024-05-21

## 2024-05-22 ENCOUNTER — OUTPATIENT (OUTPATIENT)
Dept: OUTPATIENT SERVICES | Facility: HOSPITAL | Age: 39
LOS: 1 days | End: 2024-05-22
Payer: MEDICAID

## 2024-05-22 VITALS
WEIGHT: 177.03 LBS | HEART RATE: 82 BPM | HEIGHT: 61 IN | OXYGEN SATURATION: 98 % | RESPIRATION RATE: 18 BRPM | SYSTOLIC BLOOD PRESSURE: 113 MMHG | TEMPERATURE: 98 F | DIASTOLIC BLOOD PRESSURE: 79 MMHG

## 2024-05-22 DIAGNOSIS — O35.8XX0 MATERNAL CARE FOR OTHER (SUSPECTED) FETAL ABNORMALITY AND DAMAGE, NOT APPLICABLE OR UNSPECIFIED: ICD-10-CM

## 2024-05-22 DIAGNOSIS — Z98.891 HISTORY OF UTERINE SCAR FROM PREVIOUS SURGERY: Chronic | ICD-10-CM

## 2024-05-22 DIAGNOSIS — O24.113 PRE-EXISTING TYPE 2 DIABETES MELLITUS, IN PREGNANCY, THIRD TRIMESTER: ICD-10-CM

## 2024-05-22 DIAGNOSIS — Z29.9 ENCOUNTER FOR PROPHYLACTIC MEASURES, UNSPECIFIED: ICD-10-CM

## 2024-05-22 LAB
A1C WITH ESTIMATED AVERAGE GLUCOSE RESULT: 5.6 % — SIGNIFICANT CHANGE UP (ref 4–5.6)
ANION GAP SERPL CALC-SCNC: 14 MMOL/L — SIGNIFICANT CHANGE UP (ref 5–17)
BUN SERPL-MCNC: <4 MG/DL — LOW (ref 7–23)
CALCIUM SERPL-MCNC: 8.9 MG/DL — SIGNIFICANT CHANGE UP (ref 8.4–10.5)
CHLORIDE SERPL-SCNC: 105 MMOL/L — SIGNIFICANT CHANGE UP (ref 96–108)
CO2 SERPL-SCNC: 21 MMOL/L — LOW (ref 22–31)
CREAT SERPL-MCNC: 0.56 MG/DL — SIGNIFICANT CHANGE UP (ref 0.5–1.3)
EGFR: 120 ML/MIN/1.73M2 — SIGNIFICANT CHANGE UP
ESTIMATED AVERAGE GLUCOSE: 114 MG/DL — SIGNIFICANT CHANGE UP (ref 68–114)
GLUCOSE SERPL-MCNC: 73 MG/DL — SIGNIFICANT CHANGE UP (ref 70–99)
HCT VFR BLD CALC: 34.4 % — LOW (ref 34.5–45)
HGB BLD-MCNC: 11.4 G/DL — LOW (ref 11.5–15.5)
MCHC RBC-ENTMCNC: 28.1 PG — SIGNIFICANT CHANGE UP (ref 27–34)
MCHC RBC-ENTMCNC: 33.1 GM/DL — SIGNIFICANT CHANGE UP (ref 32–36)
MCV RBC AUTO: 84.7 FL — SIGNIFICANT CHANGE UP (ref 80–100)
NRBC # BLD: 0 /100 WBCS — SIGNIFICANT CHANGE UP (ref 0–0)
PLATELET # BLD AUTO: 165 K/UL — SIGNIFICANT CHANGE UP (ref 150–400)
POTASSIUM SERPL-MCNC: 4.3 MMOL/L — SIGNIFICANT CHANGE UP (ref 3.5–5.3)
POTASSIUM SERPL-SCNC: 4.3 MMOL/L — SIGNIFICANT CHANGE UP (ref 3.5–5.3)
RBC # BLD: 4.06 M/UL — SIGNIFICANT CHANGE UP (ref 3.8–5.2)
RBC # FLD: 13.4 % — SIGNIFICANT CHANGE UP (ref 10.3–14.5)
SODIUM SERPL-SCNC: 140 MMOL/L — SIGNIFICANT CHANGE UP (ref 135–145)
WBC # BLD: 6.74 K/UL — SIGNIFICANT CHANGE UP (ref 3.8–10.5)
WBC # FLD AUTO: 6.74 K/UL — SIGNIFICANT CHANGE UP (ref 3.8–10.5)

## 2024-05-22 PROCEDURE — 80048 BASIC METABOLIC PNL TOTAL CA: CPT

## 2024-05-22 PROCEDURE — 83036 HEMOGLOBIN GLYCOSYLATED A1C: CPT

## 2024-05-22 PROCEDURE — 86901 BLOOD TYPING SEROLOGIC RH(D): CPT

## 2024-05-22 PROCEDURE — 86900 BLOOD TYPING SEROLOGIC ABO: CPT

## 2024-05-22 PROCEDURE — 85027 COMPLETE CBC AUTOMATED: CPT

## 2024-05-22 PROCEDURE — G0463: CPT

## 2024-05-22 PROCEDURE — 86850 RBC ANTIBODY SCREEN: CPT

## 2024-05-22 RX ORDER — OXYTOCIN 10 UNIT/ML
333.33 VIAL (ML) INJECTION
Qty: 20 | Refills: 0 | Status: DISCONTINUED | OUTPATIENT
Start: 2024-05-28 | End: 2024-05-31

## 2024-05-22 NOTE — OB PST NOTE - NSICDXPASTMEDICALHX_GEN_ALL_CORE_FT
PAST MEDICAL HISTORY:  2019 novel coronavirus disease (COVID-19)     Class 1 obesity with body mass index (BMI) of 33.0 to 33.9 in adult     Kidney stone     Pre-existing type 2 diabetes mellitus, in pregnancy, third trimester

## 2024-05-22 NOTE — OB PST NOTE - NSANTHOSAYNRD_GEN_A_CORE
No. TRESA screening performed.  STOP BANG Legend: 0-2 = LOW Risk; 3-4 = INTERMEDIATE Risk; 5-8 = HIGH Risk

## 2024-05-22 NOTE — OB PST NOTE - ASSESSMENT
KALINI VTE 2.0 SCORE [CLOT updated 2019]    AGE RELATED RISK FACTORS                                                       MOBILITY RELATED FACTORS  [ ] Age 41-60 years                                            (1 Point)                    [ ] Bed rest                                                        (1 Point)  [ ] Age: 61-74 years                                           (2 Points)                  [ ] Plaster cast                                                   (2 Points)  [ ] Age= 75 years                                              (3 Points)                    [ ] Bed bound for more than 72 hours                 (2 Points)    DISEASE RELATED RISK FACTORS                                               GENDER SPECIFIC FACTORS  [ ] Edema in the lower extremities                       (1 Point)              [X ] Pregnancy                                                     (1 Point)  [ ] Varicose veins                                               (1 Point)                     [ ] Post-partum < 6 weeks                                   (1 Point)             [X ] BMI > 25 Kg/m2                                            (1 Point)                     [ ] Hormonal therapy  or oral contraception          (1 Point)                 [ ] Sepsis (in the previous month)                        (1 Point)               [X ] History of pregnancy complications                 (1 point)  [ ] Pneumonia or serious lung disease                                               [ ] Unexplained or recurrent                     (1 Point)           (in the previous month)                               (1 Point)  [ ] Abnormal pulmonary function test                     (1 Point)                 SURGERY RELATED RISK FACTORS  [ ] Acute myocardial infarction                              (1 Point)               [X]  Section                                             (1 Point)  [ ] Congestive heart failure (in the previous month)  (1 Point)      [ ] Minor surgery                                                  (1 Point)   [ ] Inflammatory bowel disease                             (1 Point)               [ ] Arthroscopic surgery                                        (2 Points)  [ ] Central venous access                                      (2 Points)                [ ] General surgery lasting more than 45 minutes (2 points)  [ ] Malignancy- Present or previous                   (2 Points)                [ ] Elective arthroplasty                                         (5 points)    [ ] Stroke (in the previous month)                          (5 Points)                                                                                                                                                           HEMATOLOGY RELATED FACTORS                                                 TRAUMA RELATED RISK FACTORS  [ ] Prior episodes of VTE                                     (3 Points)                [ ] Fracture of the hip, pelvis, or leg                       (5 Points)  [ ] Positive family history for VTE                         (3 Points)             [ ] Acute spinal cord injury (in the previous month)  (5 Points)  [ ] Prothrombin 80941 A                                     (3 Points)               [ ] Paralysis  (less than 1 month)                             (5 Points)  [ ] Factor V Leiden                                             (3 Points)                  [ ] Multiple Trauma within 1 month                        (5 Points)  [ ] Lupus anticoagulants                                     (3 Points)                                                           [ ] Anticardiolipin antibodies                               (3 Points)                                                       [ ] High homocysteine in the blood                      (3 Points)                                             [ ] Other congenital or acquired thrombophilia      (3 Points)                                                [ ] Heparin induced thrombocytopenia                  (3 Points)                                     Total Score [    4      ]

## 2024-05-22 NOTE — OB PST NOTE - ACTIVITY
Able to walk 2-3 blocks, ADLs, Grocery Shopping, 1 Flight of Stairs, works as a baker at [employer], walking outdoors (weather-permitting) 3x per week for about 20 minutes

## 2024-05-22 NOTE — OB PST NOTE - ALERT: PERTINENT HISTORY
Fetal Sonogram/Patient desires tubal ligation/1st Trimester Sonogram/20 Week Level II Sonogram/BioPhysical Profile(s)/Non Invasive Prenatal Screen (NIPS)/Ultra Screen at 12 Weeks

## 2024-05-22 NOTE — OB PST NOTE - HISTORY OF PRESENT ILLNESS
39 year old Polish speaking female  at 36d2d gestation with PMH DMT2 (on insulin), kidney stone (passed on own) and obesity (BMI 33.4) presents to PST for scheduled repeat  with bilateral salpingectomy on 24 with Dr. Bell.

## 2024-05-22 NOTE — OB PST NOTE - PROBLEM SELECTOR PLAN 1
Pt is scheduled for a repeat  with bilateral salpingectomy with Dr. Bell on 24  CBC, BMP, HGA1C and T/S ordered and obtained at PST  ABO, FS on admit  Pt advised to hold humalog insulin DOS with last dose before dinner on 24  Pt advised to reduce dose of lantus insulin by 20% the evening before surgery and administer 27 units at bedtime on 24  Chlorhexidine soap and instructions reviewed and provided to pt with teach back understanding

## 2024-05-22 NOTE — OB PST NOTE - NSFIRSTLIVEBIRTH_OBGYN_ALL_OB_DT
Melida Witt   MRN: [id number]    Department:  [facility] Emergency Department   Date of Visit:  1/12/2020           Disclosure     Insurance plans vary and the physician(s) referred by the ER may not be covered by your plan.  Please contact y CARE PHYSICIAN AT ONCE OR RETURN IMMEDIATELY TO THE EMERGENCY DEPARTMENT. If you have been prescribed any medication(s), please fill your prescription right away and begin taking the medication(s) as directed.   If you believe that any of the medications 21-Mar-2016

## 2024-05-23 ENCOUNTER — NON-APPOINTMENT (OUTPATIENT)
Age: 39
End: 2024-05-23

## 2024-05-24 ENCOUNTER — APPOINTMENT (OUTPATIENT)
Dept: ANTEPARTUM | Facility: CLINIC | Age: 39
End: 2024-05-24
Payer: MEDICAID

## 2024-05-24 ENCOUNTER — ASOB RESULT (OUTPATIENT)
Age: 39
End: 2024-05-24

## 2024-05-24 ENCOUNTER — OUTPATIENT (OUTPATIENT)
Dept: OUTPATIENT SERVICES | Facility: HOSPITAL | Age: 39
LOS: 1 days | End: 2024-05-24
Payer: MEDICAID

## 2024-05-24 ENCOUNTER — APPOINTMENT (OUTPATIENT)
Dept: MATERNAL FETAL MEDICINE | Facility: CLINIC | Age: 39
End: 2024-05-24
Payer: MEDICAID

## 2024-05-24 VITALS
BODY MASS INDEX: 33.79 KG/M2 | DIASTOLIC BLOOD PRESSURE: 74 MMHG | HEIGHT: 61 IN | WEIGHT: 179 LBS | SYSTOLIC BLOOD PRESSURE: 114 MMHG | HEART RATE: 90 BPM

## 2024-05-24 DIAGNOSIS — Z3A.37 37 WEEKS GESTATION OF PREGNANCY: ICD-10-CM

## 2024-05-24 DIAGNOSIS — O34.219 MATERNAL CARE FOR UNSPECIFIED TYPE SCAR FROM PREVIOUS CESAREAN DELIVERY: ICD-10-CM

## 2024-05-24 DIAGNOSIS — O09.522 SUPERVISION OF ELDERLY MULTIGRAVIDA, SECOND TRIMESTER: ICD-10-CM

## 2024-05-24 DIAGNOSIS — O26.843 UTERINE SIZE-DATE DISCREPANCY, THIRD TRIMESTER: ICD-10-CM

## 2024-05-24 DIAGNOSIS — E11.9 TYPE 2 DIABETES MELLITUS WITHOUT COMPLICATIONS: ICD-10-CM

## 2024-05-24 DIAGNOSIS — Z36.4 ENCOUNTER FOR ANTENATAL SCREENING FOR FETAL GROWTH RETARDATION: ICD-10-CM

## 2024-05-24 DIAGNOSIS — Z22.7 LATENT TUBERCULOSIS: ICD-10-CM

## 2024-05-24 DIAGNOSIS — Z98.891 HISTORY OF UTERINE SCAR FROM PREVIOUS SURGERY: Chronic | ICD-10-CM

## 2024-05-24 DIAGNOSIS — O09.899 SUPERVISION OF OTHER HIGH RISK PREGNANCIES, UNSPECIFIED TRIMESTER: ICD-10-CM

## 2024-05-24 DIAGNOSIS — O35.EXX0 MATERNAL CARE FOR OTHER (SUSPECTED) FETAL ABNORMALITY AND DAMAGE, FETAL GENITOURINARY ANOMALIES, NOT APPLICABLE OR UNSPECIFIED: ICD-10-CM

## 2024-05-24 PROBLEM — U07.1 COVID-19: Chronic | Status: ACTIVE | Noted: 2024-05-22

## 2024-05-24 PROBLEM — E66.9 OBESITY, UNSPECIFIED: Chronic | Status: ACTIVE | Noted: 2024-05-22

## 2024-05-24 PROBLEM — N20.0 CALCULUS OF KIDNEY: Chronic | Status: ACTIVE | Noted: 2024-05-22

## 2024-05-24 PROBLEM — O24.113 PRE-EXISTING TYPE 2 DIABETES MELLITUS, IN PREGNANCY, THIRD TRIMESTER: Chronic | Status: ACTIVE | Noted: 2024-05-22

## 2024-05-24 LAB
BILIRUB UR QL STRIP: NORMAL
GLUCOSE UR-MCNC: NORMAL
HCG UR QL: 0.2 EU/DL
HGB UR QL STRIP.AUTO: NORMAL
KETONES UR-MCNC: NORMAL
LEUKOCYTE ESTERASE UR QL STRIP: NORMAL
NITRITE UR QL STRIP: NORMAL
PH UR STRIP: 6.5
PROT UR STRIP-MCNC: NORMAL
SP GR UR STRIP: 1.01

## 2024-05-24 PROCEDURE — 76818 FETAL BIOPHYS PROFILE W/NST: CPT | Mod: 26,59

## 2024-05-24 PROCEDURE — 76816 OB US FOLLOW-UP PER FETUS: CPT | Mod: 26

## 2024-05-24 PROCEDURE — G0108 DIAB MANAGE TRN  PER INDIV: CPT

## 2024-05-24 PROCEDURE — 76816 OB US FOLLOW-UP PER FETUS: CPT

## 2024-05-24 PROCEDURE — G0108: CPT

## 2024-05-24 PROCEDURE — G0463: CPT

## 2024-05-24 PROCEDURE — 99213 OFFICE O/P EST LOW 20 MIN: CPT | Mod: GC,25

## 2024-05-24 PROCEDURE — 81002 URINALYSIS NONAUTO W/O SCOPE: CPT

## 2024-05-24 PROCEDURE — 76818 FETAL BIOPHYS PROFILE W/NST: CPT

## 2024-05-24 RX ORDER — BLOOD-GLUCOSE METER
KIT MISCELLANEOUS 4 TIMES DAILY
Qty: 4 | Refills: 1 | Status: ACTIVE | COMMUNITY
Start: 2021-03-25 | End: 1900-01-01

## 2024-05-27 ENCOUNTER — TRANSCRIPTION ENCOUNTER (OUTPATIENT)
Age: 39
End: 2024-05-27

## 2024-05-28 ENCOUNTER — INPATIENT (INPATIENT)
Facility: HOSPITAL | Age: 39
LOS: 2 days | Discharge: HOME | DRG: 833 | End: 2024-05-31
Attending: OBSTETRICS & GYNECOLOGY | Admitting: OBSTETRICS & GYNECOLOGY
Payer: MEDICAID

## 2024-05-28 ENCOUNTER — APPOINTMENT (OUTPATIENT)
Dept: ANTEPARTUM | Facility: CLINIC | Age: 39
End: 2024-05-28

## 2024-05-28 ENCOUNTER — NON-APPOINTMENT (OUTPATIENT)
Age: 39
End: 2024-05-28

## 2024-05-28 ENCOUNTER — APPOINTMENT (OUTPATIENT)
Dept: OBGYN | Facility: CLINIC | Age: 39
End: 2024-05-28

## 2024-05-28 VITALS — DIASTOLIC BLOOD PRESSURE: 57 MMHG | SYSTOLIC BLOOD PRESSURE: 110 MMHG | HEART RATE: 78 BPM

## 2024-05-28 DIAGNOSIS — Z98.891 HISTORY OF UTERINE SCAR FROM PREVIOUS SURGERY: Chronic | ICD-10-CM

## 2024-05-28 DIAGNOSIS — O35.8XX0 MATERNAL CARE FOR OTHER (SUSPECTED) FETAL ABNORMALITY AND DAMAGE, NOT APPLICABLE OR UNSPECIFIED: ICD-10-CM

## 2024-05-28 LAB — GLUCOSE BLDC GLUCOMTR-MCNC: 78 MG/DL — SIGNIFICANT CHANGE UP (ref 70–99)

## 2024-05-28 PROCEDURE — 88307 TISSUE EXAM BY PATHOLOGIST: CPT | Mod: 26

## 2024-05-28 PROCEDURE — 59514 CESAREAN DELIVERY ONLY: CPT | Mod: U7

## 2024-05-28 PROCEDURE — 88302 TISSUE EXAM BY PATHOLOGIST: CPT | Mod: 26

## 2024-05-28 PROCEDURE — 58700 REMOVAL OF FALLOPIAN TUBE: CPT

## 2024-05-28 DEVICE — INTERCEED 3 X 4": Type: IMPLANTABLE DEVICE | Status: FUNCTIONAL

## 2024-05-28 RX ORDER — CHLORHEXIDINE GLUCONATE 213 G/1000ML
1 SOLUTION TOPICAL DAILY
Refills: 0 | Status: DISCONTINUED | OUTPATIENT
Start: 2024-05-28 | End: 2024-05-28

## 2024-05-28 RX ORDER — FAMOTIDINE 10 MG/ML
20 INJECTION INTRAVENOUS ONCE
Refills: 0 | Status: DISCONTINUED | OUTPATIENT
Start: 2024-05-28 | End: 2024-05-28

## 2024-05-28 RX ORDER — IBUPROFEN 200 MG
600 TABLET ORAL EVERY 6 HOURS
Refills: 0 | Status: COMPLETED | OUTPATIENT
Start: 2024-05-28 | End: 2025-04-26

## 2024-05-28 RX ORDER — SODIUM CHLORIDE 9 MG/ML
1000 INJECTION, SOLUTION INTRAVENOUS
Refills: 0 | Status: DISCONTINUED | OUTPATIENT
Start: 2024-05-28 | End: 2024-05-28

## 2024-05-28 RX ORDER — OXYCODONE HYDROCHLORIDE 5 MG/1
5 TABLET ORAL
Refills: 0 | Status: COMPLETED | OUTPATIENT
Start: 2024-05-28 | End: 2024-06-04

## 2024-05-28 RX ORDER — ACETAMINOPHEN 500 MG
1000 TABLET ORAL ONCE
Refills: 0 | Status: COMPLETED | OUTPATIENT
Start: 2024-05-28 | End: 2024-05-28

## 2024-05-28 RX ORDER — ACETAMINOPHEN 500 MG
975 TABLET ORAL
Refills: 0 | Status: DISCONTINUED | OUTPATIENT
Start: 2024-05-28 | End: 2024-05-31

## 2024-05-28 RX ORDER — SODIUM CHLORIDE 9 MG/ML
1000 INJECTION, SOLUTION INTRAVENOUS
Refills: 0 | Status: DISCONTINUED | OUTPATIENT
Start: 2024-05-28 | End: 2024-05-31

## 2024-05-28 RX ORDER — OXYCODONE HYDROCHLORIDE 5 MG/1
5 TABLET ORAL ONCE
Refills: 0 | Status: DISCONTINUED | OUTPATIENT
Start: 2024-05-28 | End: 2024-05-31

## 2024-05-28 RX ORDER — DEXTROSE 50 % IN WATER 50 %
25 SYRINGE (ML) INTRAVENOUS ONCE
Refills: 0 | Status: DISCONTINUED | OUTPATIENT
Start: 2024-05-28 | End: 2024-05-31

## 2024-05-28 RX ORDER — OXYCODONE HYDROCHLORIDE 5 MG/1
5 TABLET ORAL
Refills: 0 | Status: DISCONTINUED | OUTPATIENT
Start: 2024-05-28 | End: 2024-05-28

## 2024-05-28 RX ORDER — NALBUPHINE HYDROCHLORIDE 10 MG/ML
2.5 INJECTION, SOLUTION INTRAMUSCULAR; INTRAVENOUS; SUBCUTANEOUS EVERY 6 HOURS
Refills: 0 | Status: COMPLETED | OUTPATIENT
Start: 2024-05-28 | End: 2024-05-29

## 2024-05-28 RX ORDER — KETOROLAC TROMETHAMINE 30 MG/ML
30 SYRINGE (ML) INJECTION EVERY 6 HOURS
Refills: 0 | Status: DISCONTINUED | OUTPATIENT
Start: 2024-05-28 | End: 2024-05-29

## 2024-05-28 RX ORDER — ONDANSETRON 8 MG/1
4 TABLET, FILM COATED ORAL EVERY 6 HOURS
Refills: 0 | Status: DISCONTINUED | OUTPATIENT
Start: 2024-05-28 | End: 2024-05-31

## 2024-05-28 RX ORDER — GLUCAGON INJECTION, SOLUTION 0.5 MG/.1ML
1 INJECTION, SOLUTION SUBCUTANEOUS ONCE
Refills: 0 | Status: DISCONTINUED | OUTPATIENT
Start: 2024-05-28 | End: 2024-05-31

## 2024-05-28 RX ORDER — INSULIN LISPRO 100/ML
VIAL (ML) SUBCUTANEOUS
Refills: 0 | Status: DISCONTINUED | OUTPATIENT
Start: 2024-05-28 | End: 2024-05-31

## 2024-05-28 RX ORDER — MORPHINE SULFATE 50 MG/1
0.1 CAPSULE, EXTENDED RELEASE ORAL ONCE
Refills: 0 | Status: DISCONTINUED | OUTPATIENT
Start: 2024-05-28 | End: 2024-05-29

## 2024-05-28 RX ORDER — CEFAZOLIN SODIUM 1 G
2000 VIAL (EA) INJECTION ONCE
Refills: 0 | Status: COMPLETED | OUTPATIENT
Start: 2024-05-29 | End: 2024-05-29

## 2024-05-28 RX ORDER — SIMETHICONE 80 MG/1
80 TABLET, CHEWABLE ORAL EVERY 4 HOURS
Refills: 0 | Status: DISCONTINUED | OUTPATIENT
Start: 2024-05-28 | End: 2024-05-31

## 2024-05-28 RX ORDER — OXYTOCIN 10 UNIT/ML
333.33 VIAL (ML) INJECTION
Qty: 20 | Refills: 0 | Status: DISCONTINUED | OUTPATIENT
Start: 2024-05-28 | End: 2024-05-31

## 2024-05-28 RX ORDER — MAGNESIUM HYDROXIDE 400 MG/1
30 TABLET, CHEWABLE ORAL
Refills: 0 | Status: DISCONTINUED | OUTPATIENT
Start: 2024-05-28 | End: 2024-05-31

## 2024-05-28 RX ORDER — LANOLIN
1 OINTMENT (GRAM) TOPICAL EVERY 6 HOURS
Refills: 0 | Status: DISCONTINUED | OUTPATIENT
Start: 2024-05-28 | End: 2024-05-31

## 2024-05-28 RX ORDER — CITRIC ACID/SODIUM CITRATE 300-500 MG
15 SOLUTION, ORAL ORAL ONCE
Refills: 0 | Status: DISCONTINUED | OUTPATIENT
Start: 2024-05-28 | End: 2024-05-28

## 2024-05-28 RX ORDER — DIPHENHYDRAMINE HCL 50 MG
25 CAPSULE ORAL EVERY 6 HOURS
Refills: 0 | Status: DISCONTINUED | OUTPATIENT
Start: 2024-05-28 | End: 2024-05-31

## 2024-05-28 RX ORDER — NALOXONE HYDROCHLORIDE 4 MG/.1ML
0.1 SPRAY NASAL
Refills: 0 | Status: DISCONTINUED | OUTPATIENT
Start: 2024-05-28 | End: 2024-05-31

## 2024-05-28 RX ORDER — CEFAZOLIN SODIUM 1 G
2000 VIAL (EA) INJECTION ONCE
Refills: 0 | Status: COMPLETED | OUTPATIENT
Start: 2024-05-28 | End: 2024-05-28

## 2024-05-28 RX ORDER — TETANUS TOXOID, REDUCED DIPHTHERIA TOXOID AND ACELLULAR PERTUSSIS VACCINE, ADSORBED 5; 2.5; 8; 8; 2.5 [IU]/.5ML; [IU]/.5ML; UG/.5ML; UG/.5ML; UG/.5ML
0.5 SUSPENSION INTRAMUSCULAR ONCE
Refills: 0 | Status: DISCONTINUED | OUTPATIENT
Start: 2024-05-28 | End: 2024-05-31

## 2024-05-28 RX ORDER — DEXTROSE 50 % IN WATER 50 %
12.5 SYRINGE (ML) INTRAVENOUS ONCE
Refills: 0 | Status: DISCONTINUED | OUTPATIENT
Start: 2024-05-28 | End: 2024-05-31

## 2024-05-28 RX ORDER — DEXAMETHASONE 0.5 MG/5ML
4 ELIXIR ORAL EVERY 6 HOURS
Refills: 0 | Status: DISCONTINUED | OUTPATIENT
Start: 2024-05-28 | End: 2024-05-31

## 2024-05-28 RX ORDER — DEXTROSE 50 % IN WATER 50 %
15 SYRINGE (ML) INTRAVENOUS ONCE
Refills: 0 | Status: DISCONTINUED | OUTPATIENT
Start: 2024-05-28 | End: 2024-05-31

## 2024-05-28 RX ORDER — DEXTROSE 10 % IN WATER 10 %
125 INTRAVENOUS SOLUTION INTRAVENOUS ONCE
Refills: 0 | Status: DISCONTINUED | OUTPATIENT
Start: 2024-05-28 | End: 2024-05-31

## 2024-05-28 RX ADMIN — NALBUPHINE HYDROCHLORIDE 2.5 MILLIGRAM(S): 10 INJECTION, SOLUTION INTRAMUSCULAR; INTRAVENOUS; SUBCUTANEOUS at 20:17

## 2024-05-28 RX ADMIN — Medication 400 MILLIGRAM(S): at 19:41

## 2024-05-28 RX ADMIN — Medication 1000 MILLIUNIT(S)/MIN: at 18:45

## 2024-05-28 NOTE — OB PROVIDER H&P - NSHPPHYSICALEXAM_GEN_ALL_CORE
T(C): 36.7 (05-28-24 @ 15:05), Max: 36.7 (05-28-24 @ 15:05)  HR: 68 (05-28-24 @ 15:59) (64 - 88)  BP: 110/57 (05-28-24 @ 15:10) (110/57 - 110/57)  RR: 20 (05-28-24 @ 15:05) (20 - 20)  SpO2: 98% (05-28-24 @ 15:59) (98% - 100%)    Gen: NAD, well-appearing, AAOx3   Abd: Soft, gravid  Ext: non-tender, non-edematous  Bedside sono: cephalic, anterior placenta  FHT: 130bpm, mod, +accels, -decels  [place]: no ctx

## 2024-05-28 NOTE — OB RN PATIENT PROFILE - NSMATERNALFETALCONCERNS_OBGYN_ALL_OB_FT
Fetal Alert  5/8/24 - Very large left multicystic kidney.  Peds urology consult done, sono recommended prior to discharge.  Genome nips and vistara negative.  Initial fetal echo normal.  Follow up done at 34 weeks with findings of mildly dilated RA and RV,  and mild RV hypertrophy.  The rhythm was NSR with rare premature beats but unable to assess the etiology of the arrhythmia.  EKG prior to discharge. Peds cardiology evaluation prior to discharge or within the first 1-2 weeks of age, or sooner if there is a concern.  Fetal MDM being held on 5/9/24 - see minutes for details. Renal bladder sono to be done in hospital. Follow up with Dr. Berry at 2 weeks of age. -Yari Landrum, RNC

## 2024-05-28 NOTE — OB RN PATIENT PROFILE - PRO PRENATAL LABS ORI SOURCE RUBELLA
Was the patient seen in the last year in this department? Yes    Does patient have an active prescription for medications requested? No     Received Request Via: Pharmacy   hard copy, drawn during this pregnancy

## 2024-05-28 NOTE — OB NEONATOLOGY/PEDIATRICIAN DELIVERY SUMMARY - BABY A: APGAR 5 MIN REFLEX IRRITABILITY, DELIVERY
July 17, 2019      Silvia Omi  [address] [facility] [address]    Dear ,      At [facility], your health and wellness is our primary concern. That is why we are following up on a colposcopy from 7/24/18. Your provider had recommended that you have a Pap smear and HPV test completed by 7/24/19. Our records do not show that this has been scheduled.    It is important to complete the follow up that your provider has suggested for you to ensure that there are no worsening changes which may, over time, develop into cancer.      Please contact our office at  [phone] to schedule an appointment for a Pap smear and HPV test at your earliest convenience. If you have questions or concerns, please call the clinic and we will be happy to assist you.    If you have completed the tests outside of [facility], please have the results forwarded to our office. We will update the chart for your primary Physician to review before your next annual physical.     Thank you for choosing [facility]!    Sincerely,      Your [facility] Team/nati     (2) cough or sneeze

## 2024-05-28 NOTE — OB RN INTRAOPERATIVE NOTE - NSSELHIDDEN_OBGYN_ALL_OB_FT
[NS_[email or web address]_ALL_OB_FT:[id number]],[NS_DeliveryAssist1_OBGYN_ALL_OB_FT:[id number]],[NS_DeliveryAssist2_OBGYN_ALL_OB_FT:[id number]],[NS_DeliveryRN_OBGYN_ALL_OB_FT:[id number]]

## 2024-05-28 NOTE — OB PROVIDER DELIVERY SUMMARY - NSPROVIDERDELIVERYNOTE_OBGYN_ALL_OB_FT
rLTCS + BS + PAL    Dense scarring and adhesions at subcutaneous, rectus, and fascial layers  Small uterine window, otherwise grossly normal uterus, grossly normal L fallopian tube and ovary, R fallopian tube and ovary with filmy adhesions   Bladder noted to be tacked up high on uterus   Liveborn male infant, apgars: 8/8, weight: 3830g  Cephalic presentation, clear copious fluid  Hysterotomy closed in single layer with PDS suture  Interceed placed over hysterotomy  Fascia layer closed with vicryl suture  Subcutaneous tissue reapproximated with plain gut suture in two layers  Skin closed in subcuticular fashion with quill  Patient to receive single dose of Ancef postop .    QBL: 516  IVF: 2000  UOP: 100  Dictation Number: [id number] rLTCS + BS + PAL    Dense scarring and adhesions at subcutaneous, rectus, and fascial layers  Small uterine window, otherwise grossly normal uterus, grossly normal L fallopian tube and ovary, R fallopian tube and ovary with filmy adhesions   Bladder noted to be tacked up high on uterus   Liveborn male infant, apgars: 8/8, weight: 3830g  Cephalic presentation, clear copious fluid  Hysterotomy closed in single layer with PDS suture  Interceed placed over hysterotomy  Fascia layer closed with vicryl suture  Subcutaneous tissue reapproximated with plain gut suture in two layers  Skin closed in subcuticular fashion with quill  Patient to receive single dose of Ancef postop .    QBL: 516  IVF: 2000  UOP: 100  Dictation Number: [id number]    OBGYN Attending Note: Agree with above, I was present for delivery. Patient S/P Repeat C/S and B/L salpingectomy. +PAL of dense adhesion on anterior abdominal wall. Patient delivered a live male infant. APGARS 8 and 8, ped present at delivery. Placenta expressed, uterus firm. Right adhesions of tube to ovary, lysed, and b/l salpingectomy performed. Excellent hemostasis.  QBL 516cc. Counts correct. Ancef postop Paitnet recoverineg in stable condition. rLTCS + BS + PAL    Dense scarring and adhesions at subcutaneous, rectus, and fascial layers  Small uterine window, otherwise grossly normal uterus, grossly normal L fallopian tube and ovary, R fallopian tube and ovary with filmy adhesions   Bladder noted to be tacked up high on uterus   Liveborn male infant, apgars: 8/8, weight: 3830g  Cephalic presentation, clear copious fluid  Hysterotomy closed in single layer with PDS suture  Interceed placed over hysterotomy  Fascia layer closed with vicryl suture  Subcutaneous tissue reapproximated with plain gut suture in two layers  Skin closed in subcuticular fashion with quill  Patient to receive single dose of Ancef postop .    QBL: 516  IVF: 2000  UOP: 100  Dictation Number: [id number]    OBGYN Attending Note: Agree with above, I was present for delivery. Patient S/P Repeat C/S and B/L salpingectomy. +PAL of dense adhesion on anterior abdominal wall. Patient delivered a live male infant. APGARS 8 and 8, ped present at delivery. Placenta expressed, uterus firm. Right adhesions of tube to ovary, lysed, and b/l salpingectomy performed. Excellent hemostasis.  QBL 516cc. Counts correct. Ancef postopdue to possible surgicalfield contatmination/surgical glove tear  Paitnet recoverineg in stable condition.

## 2024-05-28 NOTE — OB PROVIDER DELIVERY SUMMARY - NSSELHIDDEN_OBGYN_ALL_OB_FT
[NS_[email or web address]_ALL_OB_FT:[id number]],[NS_DeliveryAssist1_OBGYN_ALL_OB_FT:[id number]],[NS_DeliveryAssist2_OBGYN_ALL_OB_FT:[id number]],[NS_DeliveryRN_OBGYN_ALL_OB_FT:[id number]]

## 2024-05-28 NOTE — OB PROVIDER H&P - ASSESSMENT
A/P: 34y  at 37w5d GA admitted to L&D for scheduled repeat  section and bilateral salpingectomy  -Admit to L&D  -Consents signed with    -Preop labs  -NPO  -Preop meds  -IV fluids  -Fetus: Reactive NST, for  cardiac and renal evaluation  -GBS: Positive  -Anesthesia consult    Discussed with Dr. Ray Briggs PGY2

## 2024-05-28 NOTE — OB RN DELIVERY SUMMARY - NSSELHIDDEN_OBGYN_ALL_OB_FT
[NS_[email or web address]_ALL_OB_FT:[id number]],[NS_DeliveryAssist1_OBGYN_ALL_OB_FT:[id number]],[NS_DeliveryAssist2_OBGYN_ALL_OB_FT:[id number]],[NS_DeliveryRN_OBGYN_ALL_OB_FT:[id number]]

## 2024-05-28 NOTE — OB PROVIDER H&P - ATTENDING COMMENTS
OBGYN Attending Note: Agree with above patient seen by me.   35 y/o  at 37wks 5 days admitted to LD for Repeat C/S (3rd C/S) and BTL,  Type 2 DM, +fetopathy, fetal renal issues (multicystic) and fetal  right atrium mildly dilated  and right ventricle and mild right ventricular hypertrophy. Fetal alert and post delivery evaluation by peds urology and peds cardiology  paitent signed consent for BTL, bilateral salpingectomy, permanent sterilization.   FHR 130s, + moderate varaibility +accels, no decels, occasional  contractions  EFW 4100gms.  Patinet stable and set up for Repeat C/S and BTL/B/L salpingectomy.  Amira Bell MD

## 2024-05-28 NOTE — OB PROVIDER H&P - HISTORY OF PRESENT ILLNESS
ID# [id number] and [id number] used for encounter    39y  at 37w5d GA who presents to L&D for scheduled rLTCS + BS. +FM, -Ctx, -LOF, -VB. Denies fevers, chills, nausea, vomiting, chest pain, SOB, dizziness and headache. No other complaints at this time. Patient desires bilateral salpingectomy for contraception.    LUH: 24  EFW: 4097g (on sono from )  GBS: Positive bacteruria     Prenatal course is significant for:  - Receives care at [facility]  - Positive quant gold during pregnancy (CXR  clear lungs)  - T2DM - controlled on Lantus and Humalog  - FA for L multicystic dysplastic kidney and abnormal fetal echo with mildly dilated RA/RV, mild RVH with rare premature beats - per MDM will need  renal/bladder US in hospital, f/u with Dr. Berry at 2wks old,  EKG/Echo    Obhx:  - 2019: SAB  - 2016: pCS - 11#  - 2021: rCS @36w due to concern for uterine rupture (hysterotomy intact) - 6#4  Gynhx: Denies fibroids, ovarian cysts, STIs, abnormal PAPs   PMH: T2DM;  Denies HTN, asthma  PSH: C/S x2  Soc hx: Denies alcohol, tobacco and illicit drug use during this pregnancy  Anx/dep:  Denies  Meds: PNV, ASA (alternating 81mg/162mg), Lantus 34u QHS, Humalog   Allergies: NKDA    Will accept blood transfusion

## 2024-05-28 NOTE — OB RN PATIENT PROFILE - FALL HARM RISK - UNIVERSAL INTERVENTIONS
Bed in lowest position, wheels locked, appropriate side rails in place/Call bell, personal items and telephone in reach/Instruct patient to call for assistance before getting out of bed or chair/Non-slip footwear when patient is out of bed/[place] to call system/Physically safe environment - no spills, clutter or unnecessary equipment/Purposeful Proactive Rounding/Room/bathroom lighting operational, light cord in reach

## 2024-05-28 NOTE — OB RN DELIVERY SUMMARY - NS_SEPSISRSKCALC_OBGYN_ALL_OB_FT
EOS calculated successfully. EOS Risk Factor: 0.5/1000 live births ([facility] national incidence); GA=37w5d; Temp=98.1; ROM=0; GBS='Unknown'; Antibiotics='No antibiotics or any antibiotics < 2 hrs prior to birth'

## 2024-05-28 NOTE — OB RN PREOPERATIVE CHECKLIST - NS_PREOPMEDADMIN_OBGYN_ALL_OB
Department of Obstetrics and Gynecology  Labor and Delivery  Attending Post Partum Progress Note    PPD #1    SUBJECTIVE: Feeling well. No complaints. OBJECTIVE:     Vitals:  BP (!) 112/55   Pulse 87   Temp 97.5 °F (36.4 °C) (Oral)   Resp 16   Ht 4' 9\" (1.448 m)   Wt 207 lb 3.2 oz (94 kg)   SpO2 98%   Breastfeeding Unknown   BMI 44.84 kg/m²     Uterus:  normal size, well involuted, firm, non-tender    DATA:    Hemoglobin:   Lab Results   Component Value Date    HGB 10.1 11/08/2020       ASSESSMENT & PLAN:  Doing well. Anticipate home on post partum day #2.     Matt Calvillo 11/10/2020 No

## 2024-05-28 NOTE — OB NEONATOLOGY/PEDIATRICIAN DELIVERY SUMMARY - NSPEDSNEONOTESA_OBGYN_ALL_OB_FT
Peds called to delivery due to fetal alert for very large left multicystic kidney and 34 week fetal echo with mildly dilated RA and RV, and mild RV hypertrophy.  The rhythm was NSR with rare premature beats.   This is a 37.5 week gestation male born to a 39 year old  mother.   Maternal labs include blood type O+, Rub immune, RPR nonreactive, Hep B sAg[ - ], HIV negative, GBS unknown, Hep C sent. Maternal history is significant for Type 2 Diabetes on Lantus and kidney stone. Pregnancy was complicated by fetal alerts listed above, peds urology consult done and renal bladder sono recommended prior to discharge.  Genome nips and vistara negative. Plan for nonurgent cardiology evaluation including EKG either in hospital or 1-2 weeks after discharge.  Delivery was via scheduled repeat .  AROM at delivery with clear fluids. Infant emerged cephalic with good cry, delayed cord clamping x60 seconds. Resuscitation included: warmed, dried, suctioned, stimulated. CPAP +5/21% started at 5 minutes of life for cyanosis, FiO2 titrated to 40% to achieve goal SpO2. Weaned to room air by 15 minutes of life.  Apgars were: 8/8. Admit to NBN. Physical exam significant for left flank fullness and abdominal distension.

## 2024-05-29 DIAGNOSIS — E11.9 TYPE 2 DIABETES MELLITUS WITHOUT COMPLICATIONS: ICD-10-CM

## 2024-05-29 DIAGNOSIS — O26.899 OTHER SPECIFIED PREGNANCY RELATED CONDITIONS, UNSPECIFIED TRIMESTER: ICD-10-CM

## 2024-05-29 LAB
BASOPHILS # BLD AUTO: 0.01 K/UL — SIGNIFICANT CHANGE UP (ref 0–0.2)
BASOPHILS NFR BLD AUTO: 0.1 % — SIGNIFICANT CHANGE UP (ref 0–2)
EOSINOPHIL # BLD AUTO: 0 K/UL — SIGNIFICANT CHANGE UP (ref 0–0.5)
EOSINOPHIL NFR BLD AUTO: 0 % — SIGNIFICANT CHANGE UP (ref 0–6)
GLUCOSE BLDC GLUCOMTR-MCNC: 110 MG/DL — HIGH (ref 70–99)
GLUCOSE BLDC GLUCOMTR-MCNC: 120 MG/DL — HIGH (ref 70–99)
GLUCOSE BLDC GLUCOMTR-MCNC: 137 MG/DL — HIGH (ref 70–99)
HCT VFR BLD CALC: 27.4 % — LOW (ref 34.5–45)
HCV AB S/CO SERPL IA: 0.23 S/CO — SIGNIFICANT CHANGE UP (ref 0–0.99)
HCV AB SERPL-IMP: SIGNIFICANT CHANGE UP
HGB BLD-MCNC: 9.3 G/DL — LOW (ref 11.5–15.5)
IMM GRANULOCYTES NFR BLD AUTO: 0.4 % — SIGNIFICANT CHANGE UP (ref 0–0.9)
LYMPHOCYTES # BLD AUTO: 1.31 K/UL — SIGNIFICANT CHANGE UP (ref 1–3.3)
LYMPHOCYTES # BLD AUTO: 14.1 % — SIGNIFICANT CHANGE UP (ref 13–44)
MCHC RBC-ENTMCNC: 28.4 PG — SIGNIFICANT CHANGE UP (ref 27–34)
MCHC RBC-ENTMCNC: 33.9 GM/DL — SIGNIFICANT CHANGE UP (ref 32–36)
MCV RBC AUTO: 83.5 FL — SIGNIFICANT CHANGE UP (ref 80–100)
MONOCYTES # BLD AUTO: 0.7 K/UL — SIGNIFICANT CHANGE UP (ref 0–0.9)
MONOCYTES NFR BLD AUTO: 7.6 % — SIGNIFICANT CHANGE UP (ref 2–14)
NEUTROPHILS # BLD AUTO: 7.21 K/UL — SIGNIFICANT CHANGE UP (ref 1.8–7.4)
NEUTROPHILS NFR BLD AUTO: 77.8 % — HIGH (ref 43–77)
NRBC # BLD: 0 /100 WBCS — SIGNIFICANT CHANGE UP (ref 0–0)
PLATELET # BLD AUTO: 139 K/UL — LOW (ref 150–400)
RBC # BLD: 3.28 M/UL — LOW (ref 3.8–5.2)
RBC # FLD: 13.2 % — SIGNIFICANT CHANGE UP (ref 10.3–14.5)
T PALLIDUM AB TITR SER: NEGATIVE — SIGNIFICANT CHANGE UP
WBC # BLD: 9.27 K/UL — SIGNIFICANT CHANGE UP (ref 3.8–10.5)
WBC # FLD AUTO: 9.27 K/UL — SIGNIFICANT CHANGE UP (ref 3.8–10.5)

## 2024-05-29 PROCEDURE — 99222 1ST HOSP IP/OBS MODERATE 55: CPT

## 2024-05-29 RX ORDER — METFORMIN HYDROCHLORIDE 850 MG/1
500 TABLET ORAL
Refills: 0 | Status: DISCONTINUED | OUTPATIENT
Start: 2024-05-29 | End: 2024-05-31

## 2024-05-29 RX ORDER — HEPARIN SODIUM 5000 [USP'U]/ML
5000 INJECTION INTRAVENOUS; SUBCUTANEOUS EVERY 12 HOURS
Refills: 0 | Status: DISCONTINUED | OUTPATIENT
Start: 2024-05-29 | End: 2024-05-31

## 2024-05-29 RX ORDER — DIPHENHYDRAMINE HCL 50 MG
25 CAPSULE ORAL EVERY 4 HOURS
Refills: 0 | Status: DISCONTINUED | OUTPATIENT
Start: 2024-05-29 | End: 2024-05-31

## 2024-05-29 RX ORDER — IBUPROFEN 200 MG
600 TABLET ORAL EVERY 6 HOURS
Refills: 0 | Status: DISCONTINUED | OUTPATIENT
Start: 2024-05-29 | End: 2024-05-31

## 2024-05-29 RX ADMIN — METFORMIN HYDROCHLORIDE 500 MILLIGRAM(S): 850 TABLET ORAL at 18:51

## 2024-05-29 RX ADMIN — Medication 30 MILLIGRAM(S): at 05:42

## 2024-05-29 RX ADMIN — Medication 30 MILLIGRAM(S): at 17:54

## 2024-05-29 RX ADMIN — Medication 975 MILLIGRAM(S): at 03:30

## 2024-05-29 RX ADMIN — NALBUPHINE HYDROCHLORIDE 2.5 MILLIGRAM(S): 10 INJECTION, SOLUTION INTRAMUSCULAR; INTRAVENOUS; SUBCUTANEOUS at 11:10

## 2024-05-29 RX ADMIN — Medication 30 MILLIGRAM(S): at 12:15

## 2024-05-29 RX ADMIN — Medication 30 MILLIGRAM(S): at 00:34

## 2024-05-29 RX ADMIN — Medication 975 MILLIGRAM(S): at 02:36

## 2024-05-29 RX ADMIN — Medication 975 MILLIGRAM(S): at 15:20

## 2024-05-29 RX ADMIN — Medication 100 MILLIGRAM(S): at 02:36

## 2024-05-29 RX ADMIN — ONDANSETRON 4 MILLIGRAM(S): 8 TABLET, FILM COATED ORAL at 00:38

## 2024-05-29 RX ADMIN — Medication 975 MILLIGRAM(S): at 20:22

## 2024-05-29 RX ADMIN — Medication 975 MILLIGRAM(S): at 09:30

## 2024-05-29 RX ADMIN — Medication 975 MILLIGRAM(S): at 21:00

## 2024-05-29 RX ADMIN — Medication 600 MILLIGRAM(S): at 23:28

## 2024-05-29 RX ADMIN — Medication 30 MILLIGRAM(S): at 06:40

## 2024-05-29 RX ADMIN — Medication 30 MILLIGRAM(S): at 18:50

## 2024-05-29 RX ADMIN — Medication 975 MILLIGRAM(S): at 08:32

## 2024-05-29 RX ADMIN — Medication 975 MILLIGRAM(S): at 14:22

## 2024-05-29 RX ADMIN — HEPARIN SODIUM 5000 UNIT(S): 5000 INJECTION INTRAVENOUS; SUBCUTANEOUS at 05:43

## 2024-05-29 RX ADMIN — NALBUPHINE HYDROCHLORIDE 2.5 MILLIGRAM(S): 10 INJECTION, SOLUTION INTRAMUSCULAR; INTRAVENOUS; SUBCUTANEOUS at 10:12

## 2024-05-29 RX ADMIN — Medication 30 MILLIGRAM(S): at 11:18

## 2024-05-29 RX ADMIN — Medication 30 MILLIGRAM(S): at 01:27

## 2024-05-29 RX ADMIN — HEPARIN SODIUM 5000 UNIT(S): 5000 INJECTION INTRAVENOUS; SUBCUTANEOUS at 17:54

## 2024-05-29 NOTE — CONSULT NOTE ADULT - SUBJECTIVE AND OBJECTIVE BOX
HPI:  24  [place]  [id number] [place]  HPI:   ID# [id number] and [id number] used for encounter    39y  at 37w5d GA who presents to L&D for scheduled rLTCS + BS. +FM, -Ctx, -LOF, -VB. Denies fevers, chills, nausea, vomiting, chest pain, SOB, dizziness and headache. No other complaints at this time. Patient desires bilateral salpingectomy for contraception.    LUH: 24  EFW: 4097g (on sono from )  GBS: Positive bacteruria     Prenatal course is significant for:  - Receives care at [facility]  - Positive quant gold during pregnancy (CXR  clear lungs)  - T2DM - controlled on Lantus and Humalog  - FA for L multicystic dysplastic kidney and abnormal fetal echo with mildly dilated RA/RV, mild RVH with rare premature beats - per MDM will need  renal/bladder US in hospital, f/u with Dr. Berry at 2wks old,  EKG/Echo    Obhx:  - 2019: SAB  - 2016: pCS - 11#  - 2021: rCS @36w due to concern for uterine rupture (hysterotomy intact) - 6#4  Gynhx: Denies fibroids, ovarian cysts, STIs, abnormal PAPs   PMH: T2DM;  Denies HTN, asthma  PSH: C/S x2  Soc hx: Denies alcohol, tobacco and illicit drug use during this pregnancy  Anx/dep:  Denies  Meds: PNV, ASA (alternating 81mg/162mg), Lantus 34u QHS, Humalog   Allergies: NKDA    Will accept blood transfusion (28 May 2024 15:53)    ENDOCRINE HISTORY:  Patient is a 39-year-old woman with history of type 2 diabetes diagnosed about 6 years ago, presenting to the hospital for  at 37 weeks and 5 days pregnant.  Diabetes complicated by pregestational diabetes mellitus type 2.  Endocrinology consulted for management of diabetes post partum.  Patient reports that prior to her pregnancy, she was on metformin in the past but it was later changed to Januvia 100 mg once daily by her primary care doctor.  While she was pregnant, patient was on basal bolus insulin regimen.  Patient reported family history of type 2 diabetes in her father.  Patient denies any complication from type 2 diabetes.  Patient reports that she monitors glucose very consistently during her pregnancy.  Patient reports that she plans on breast-feeding.  The baby boy was noticed to have a polycystic kidney on the fetal ultrasound and will be following up with cardiology, urology postdischarge.    PAST MEDICAL & SURGICAL HISTORY:  Pre-existing type 2 diabetes mellitus, in pregnancy, third trimester  Class 1 obesity with body mass index (BMI) of 33.0 to 33.9 in adult  2019 novel coronavirus disease (COVID-19)  Kidney stone  History of  section    FAMILY HISTORY:  father - type 2 dm    Social History:  no smoking  no alcohol  No drugs  has 2 odler children     Outpatient Medications:  Home Medications:  aspirin 81 mg oral capsule: 1 cap(s) orally once a day 1 tablet on even days  2 tablets on odd days (22 May 2024 11:17)  HumaLOG 100 units/mL injectable solution: 6 international unit(s) injectable 3 times a day (22 May 2024 11:17)  Lantus 100 units/mL subcutaneous solution: 34 international unit(s) subcutaneous once a day (at bedtime) (22 May 2024 11:15)    MEDICATIONS  (STANDING):  acetaminophen     Tablet .. 975 milliGRAM(s) Oral <User Schedule>  dextrose 10% Bolus 125 milliLiter(s) IV Bolus once  dextrose 5%. 1000 milliLiter(s) (50 mL/Hr) IV Continuous <Continuous>  dextrose 5%. 1000 milliLiter(s) (100 mL/Hr) IV Continuous <Continuous>  dextrose 50% Injectable 25 Gram(s) IV Push once  dextrose 50% Injectable 12.5 Gram(s) IV Push once  diphtheria/tetanus/pertussis (acellular) Vaccine (Adacel) 0.5 milliLiter(s) IntraMuscular once  glucagon  Injectable 1 milliGRAM(s) IntraMuscular once  heparin   Injectable 5000 Unit(s) SubCutaneous every 12 hours  ibuprofen  Tablet. 600 milliGRAM(s) Oral every 6 hours  insulin lispro (ADMELOG) corrective regimen sliding scale   SubCutaneous three times a day before meals  ketorolac   Injectable 30 milliGRAM(s) IV Push every 6 hours  lactated ringers. 1000 milliLiter(s) (125 mL/Hr) IV Continuous <Continuous>  morphine PF Spinal 0.1 milliGRAM(s) IntraThecal. once  oxytocin Infusion 333.333 milliUNIT(s)/Min (1000 mL/Hr) IV Continuous <Continuous>  oxytocin Infusion 333.333 milliUNIT(s)/Min (1000 mL/Hr) IV Continuous <Continuous>    MEDICATIONS  (PRN):  dexAMETHasone  Injectable 4 milliGRAM(s) IV Push every 6 hours PRN Nausea  dextrose Oral Gel 15 Gram(s) Oral once PRN Blood Glucose LESS THAN 70 milliGRAM(s)/deciliter  diphenhydrAMINE 25 milliGRAM(s) Oral every 4 hours PRN Rash and/or Itching  diphenhydrAMINE 25 milliGRAM(s) Oral every 6 hours PRN Pruritus  lanolin Ointment 1 Application(s) Topical every 6 hours PRN Sore Nipples  magnesium hydroxide Suspension 30 milliLiter(s) Oral two times a day PRN Constipation  naloxone Injectable 0.1 milliGRAM(s) IV Push every 3 minutes PRN For ANY of the following changes in patient status:  A. Breaths Per Minute LESS THAN 10, B. Oxygen saturation LESS THAN 90%, C. Sedation score of 6 for Stop After: 4 Times  ondansetron Injectable 4 milliGRAM(s) IV Push every 6 hours PRN Nausea  oxyCODONE    IR 5 milliGRAM(s) Oral every 3 hours PRN Mild Pain (1 - 3)  oxyCODONE    IR 5 milliGRAM(s) Oral every 3 hours PRN Moderate to Severe Pain (4-10)  oxyCODONE    IR 5 milliGRAM(s) Oral once PRN Moderate to Severe Pain (4-10)  simethicone 80 milliGRAM(s) Chew every 4 hours PRN Gas      Allergies    No Known Allergies    Intolerances      Review of Systems:  Constitutional: No fever  Eyes: No blurry vision  Neuro: No tremors  HEENT: No pain  Cardiovascular: No chest pain, palpitations  Respiratory: No SOB, no cough  GI: No nausea, vomiting, abdominal pain  : No dysuria  Skin: no rash  Psych: no depression  Endocrine: no polyuria, polydipsia  Hem/lymph: no swelling  Osteoporosis: no fractures    ALL OTHER SYSTEMS REVIEWED AND NEGATIVE    PHYSICAL EXAM:  -----------------------------  VITALS: T(C): 36.4 (24 @ 13:09)  T(F): 97.6 (24 @ 13:09), Max: 98.8 (24 @ 20:45)  HR: 61 (24 @ 13:09) (54 - 88)  BP: 94/62 (24 @ 13:09) (94/57 - 140/71)  RR:  (17 - 20)  SpO2:  (96% - 100%)  Wt(kg): --  GENERAL: NAD, well-groomed, well-developed  EYES: No proptosis, no lid lag, anicteric  HEENT:  Atraumatic, Normocephalic, moist mucous membranes  THYROID: Normal size, no palpable nodules  RESPIRATORY: Clear to auscultation bilaterally; No rales, rhonchi, wheezing, or rubs  CARDIOVASCULAR: Regular rate and rhythm; No murmurs; no peripheral edema  GI: Soft, nontender, non distended, normal bowel sounds  SKIN: Dry, intact, No rashes or lesions  MUSCULOSKELETAL: Full range of motion, normal strength  NEURO: sensation intact, extraocular movements intact, no tremor, normal reflexes  PSYCH: Alert and oriented x 3, normal affect, normal mood  CUSHING'S SIGNS: no striae    POCT Blood Glucose.: 120 mg/dL (24 @ 12:50)  POCT Blood Glucose.: 110 mg/dL (24 @ 07:40)  POCT Blood Glucose.: 78 mg/dL (24 @ 15:04)                            9.3    9.27  )-----------( 139      ( 29 May 2024 07:13 )             27.4               Thyroid Function Tests:      A1C with Estimated Average Glucose Result: 5.6 % (24 @ 13:02)  A1C with Estimated Average Glucose Result: 5.3 % (24 @ 11:57)  A1C with Estimated Average Glucose Result: 8.8 % (23 @ 20:16)          Radiology:   ------------------------

## 2024-05-29 NOTE — CONSULT NOTE ADULT - ASSESSMENT
39-year-old woman with history of pre-existing type 2 diabetes, diagnosed about 6 years ago, admitted for C-sections at 37 weeks and 5 days.  Pregnancy complicated by type 2 diabetes.  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  Endocrinology consulted for management of type 2 diabetes in the postpartum setting.    1.  Type 2 diabetes  A1C:A1C with Estimated Average Glucose Result: 5.6 % (05-22-24 @ 13:02)  A1C with Estimated Average Glucose Result: 5.3 % (02-16-24 @ 11:57)  A1C with Estimated Average Glucose Result: 8.8 % (11-17-23 @ 20:16)  Home regimen: basal bolus  Endocrinologist: none  Discussed with patient the importance of Carb consistent diet and exercise as tolerated.    Recommend nutritional consultation  Recommend DM education through RN staff (see physical to RN order)  Discussed glycemic goal of a1c <7% to prevent microvascular complications of diabetes mellitus and reduce the risk of macrovascular complications.   Recommend annual podiatry and ophthalmology follow up.   Glucose goal of 80-180mg/dl while in patient.   Recommend LDSS (1:50 above 150mg/dl) and night time low does sliding scale for hyperglycemia corrections    Discharge recommendation/considerations:  Given patient planned to breastfeed  Recommend stopping Januvia 100mg once daily   Recommend starting Metformin ER 500mg twice daily and up-titrate to 1000mg twice daily as outpatient  Metformin: Week 1- 500mg po bid, week 2: 500mg po qam/1000mg po pqm, and week 3: 1000mg po bid   PLAN DISCUSSED WITH PRIMARY TEAM VIA [employer] TEAMS/PHONE/EMAIL    2.  BP management  BP goal <130/80  Management as ob/gyn team   Check annual albumin/creatinine ratio    3.  Hyperlipidemia  Check fasting lipid profile outpatient.  Consider statin therapy if patient is done with breast feeding.

## 2024-05-29 NOTE — PROGRESS NOTE ADULT - SUBJECTIVE AND OBJECTIVE BOX
OB Progress Note:  Delivery, POD#1    S: 40yo POD#1 s/p rLTCS+BS with PMHx of T2DM. Her pain is well controlled. She is tolerating a regular diet and passing flatus. Denies N/V. Denies CP/SOB/lightheadedness/dizziness.   She is ambulating without difficulty.   Voiding spontaneously.     O:   Vital Signs Last 24 Hrs  T(C): 36.7 (29 May 2024 06:02), Max: 37.1 (28 May 2024 20:45)  T(F): 98.1 (29 May 2024 06:02), Max: 98.8 (28 May 2024 20:45)  HR: 68 (29 May 2024 06:02) (54 - 88)  BP: 97/59 (29 May 2024 06:02) (97/59 - 140/71)  BP(mean): 100 (28 May 2024 21:30) (79 - 100)  RR: 18 (29 May 2024 06:02) (17 - 20)  SpO2: 97% (29 May 2024 06:02) (96% - 100%)    Parameters below as of 29 May 2024 06:02  Patient On (Oxygen Delivery Method): room air        Labs:  Blood type: O Positive  Rubella IgG: RPR: Negative                    PE:  General: NAD  Abdomen: Mildly distended, appropriately tender, incision c/d/i.  Extremities: No erythema, no pitting edema     OB Progress Note:  Delivery, POD#1    S: 40yo POD#1 s/p rLTCS+BS with PMHx of T2DM. Her pain is well controlled. She has had minimal PO intake due to cafeteria being closed but to get breakfast this AM. Not yet voiding spontaneouslyDenies N/V. Denies CP/SOB/lightheadedness/dizziness.   She is ambulating without difficulty.       O:   Vital Signs Last 24 Hrs  T(C): 36.7 (29 May 2024 06:02), Max: 37.1 (28 May 2024 20:45)  T(F): 98.1 (29 May 2024 06:02), Max: 98.8 (28 May 2024 20:45)  HR: 68 (29 May 2024 06:02) (54 - 88)  BP: 97/59 (29 May 2024 06:02) (97/59 - 140/71)  BP(mean): 100 (28 May 2024 21:30) (79 - 100)  RR: 18 (29 May 2024 06:02) (17 - 20)  SpO2: 97% (29 May 2024 06:02) (96% - 100%)    Parameters below as of 29 May 2024 06:02  Patient On (Oxygen Delivery Method): room air        Labs:  Blood type: O Positive  Rubella IgG: RPR: Negative                    PE:  General: NAD  Abdomen: Mildly distended, appropriately tender, incision c/d/i.  Extremities: No erythema, no pitting edema

## 2024-05-29 NOTE — PROGRESS NOTE ADULT - SUBJECTIVE AND OBJECTIVE BOX
Day 1 of Anesthesia Pain Management Service    SUBJECTIVE:  Pain Scale Score:          [X] Refer to charted pain scores    THERAPY: Received PF spinal morphine as above    OBJECTIVE:    Sedation:        	[X] Alert	[ ] Drowsy	[ ] Arousable      [ ] Asleep       [ ] Unresponsive    Side Effects:	[X] None	[ ] Nausea	[ ] Vomiting         [ ] Pruritus  		[ ] Weakness            [ ] Numbness	          [ ] Other:    ASSESSMENT/ PLAN  [X] Patient transitioned to prn analgesics  [X] Pain management per primary service, pain service to sign off   [X]Documentation and Verification of current medications    Isidoro Zelaya DO  Anesthesia

## 2024-05-29 NOTE — PROGRESS NOTE ADULT - SUBJECTIVE AND OBJECTIVE BOX
Day 1 of Anesthesia Pain Management Service    SUBJECTIVE: Doing ok  Pain Scale Score:          [X] Refer to charted pain scores    THERAPY:    s/p   100  mcg PF morphine on 5\28\2024      MEDICATIONS  (STANDING):  acetaminophen     Tablet .. 975 milliGRAM(s) Oral <User Schedule>  dextrose 10% Bolus 125 milliLiter(s) IV Bolus once  dextrose 5%. 1000 milliLiter(s) (50 mL/Hr) IV Continuous <Continuous>  dextrose 5%. 1000 milliLiter(s) (100 mL/Hr) IV Continuous <Continuous>  dextrose 50% Injectable 25 Gram(s) IV Push once  dextrose 50% Injectable 12.5 Gram(s) IV Push once  diphtheria/tetanus/pertussis (acellular) Vaccine (Adacel) 0.5 milliLiter(s) IntraMuscular once  glucagon  Injectable 1 milliGRAM(s) IntraMuscular once  heparin   Injectable 5000 Unit(s) SubCutaneous every 12 hours  ibuprofen  Tablet. 600 milliGRAM(s) Oral every 6 hours  insulin lispro (ADMELOG) corrective regimen sliding scale   SubCutaneous three times a day before meals  ketorolac   Injectable 30 milliGRAM(s) IV Push every 6 hours  lactated ringers. 1000 milliLiter(s) (125 mL/Hr) IV Continuous <Continuous>  morphine PF Spinal 0.1 milliGRAM(s) IntraThecal. once  oxytocin Infusion 333.333 milliUNIT(s)/Min (1000 mL/Hr) IV Continuous <Continuous>  oxytocin Infusion 333.333 milliUNIT(s)/Min (1000 mL/Hr) IV Continuous <Continuous>    MEDICATIONS  (PRN):  dexAMETHasone  Injectable 4 milliGRAM(s) IV Push every 6 hours PRN Nausea  dextrose Oral Gel 15 Gram(s) Oral once PRN Blood Glucose LESS THAN 70 milliGRAM(s)/deciliter  diphenhydrAMINE 25 milliGRAM(s) Oral every 6 hours PRN Pruritus  lanolin Ointment 1 Application(s) Topical every 6 hours PRN Sore Nipples  magnesium hydroxide Suspension 30 milliLiter(s) Oral two times a day PRN Constipation  nalbuphine Injectable 2.5 milliGRAM(s) IV Push every 6 hours PRN Pruritus  naloxone Injectable 0.1 milliGRAM(s) IV Push every 3 minutes PRN For ANY of the following changes in patient status:  A. Breaths Per Minute LESS THAN 10, B. Oxygen saturation LESS THAN 90%, C. Sedation score of 6 for Stop After: 4 Times  ondansetron Injectable 4 milliGRAM(s) IV Push every 6 hours PRN Nausea  oxyCODONE    IR 5 milliGRAM(s) Oral every 3 hours PRN Moderate to Severe Pain (4-10)  oxyCODONE    IR 5 milliGRAM(s) Oral once PRN Moderate to Severe Pain (4-10)  oxyCODONE    IR 5 milliGRAM(s) Oral every 3 hours PRN Mild Pain (1 - 3)  simethicone 80 milliGRAM(s) Chew every 4 hours PRN Gas      OBJECTIVE:    Sedation:        	[X] Alert	 [ ] Drowsy	[ ] Arousable      [ ] Asleep       [ ] Unresponsive    Side Effects:	[X] None 	[ ] Nausea	[ ] Vomiting         [ ] Pruritus  		[ ] Weakness            [ ] Numbness	          [ ] Other:    Vital Signs Last 24 Hrs  T(C): 36.4 (29 May 2024 09:05), Max: 37.1 (28 May 2024 20:45)  T(F): 97.6 (29 May 2024 09:05), Max: 98.8 (28 May 2024 20:45)  HR: 77 (29 May 2024 09:05) (54 - 88)  BP: 94/57 (29 May 2024 09:05) (94/57 - 140/71)  BP(mean): 100 (28 May 2024 21:30) (79 - 100)  RR: 17 (29 May 2024 09:05) (17 - 20)  SpO2: 98% (29 May 2024 09:05) (96% - 100%)    Parameters below as of 29 May 2024 09:05  Patient On (Oxygen Delivery Method): room air        ASSESSMENT/ PLAN  [X] Patient to be transitioned to prn analgesics after 24 hours  [X] Pain management per primary service, pain service to sign off   [X]Documentation and Verification of current medications

## 2024-05-30 ENCOUNTER — NON-APPOINTMENT (OUTPATIENT)
Age: 39
End: 2024-05-30

## 2024-05-30 LAB
GLUCOSE BLDC GLUCOMTR-MCNC: 77 MG/DL — SIGNIFICANT CHANGE UP (ref 70–99)
GLUCOSE BLDC GLUCOMTR-MCNC: 78 MG/DL — SIGNIFICANT CHANGE UP (ref 70–99)
GLUCOSE BLDC GLUCOMTR-MCNC: 88 MG/DL — SIGNIFICANT CHANGE UP (ref 70–99)

## 2024-05-30 PROCEDURE — 99232 SBSQ HOSP IP/OBS MODERATE 35: CPT

## 2024-05-30 RX ADMIN — HEPARIN SODIUM 5000 UNIT(S): 5000 INJECTION INTRAVENOUS; SUBCUTANEOUS at 06:18

## 2024-05-30 RX ADMIN — Medication 600 MILLIGRAM(S): at 18:32

## 2024-05-30 RX ADMIN — Medication 975 MILLIGRAM(S): at 22:35

## 2024-05-30 RX ADMIN — Medication 600 MILLIGRAM(S): at 20:10

## 2024-05-30 RX ADMIN — Medication 975 MILLIGRAM(S): at 10:00

## 2024-05-30 RX ADMIN — Medication 975 MILLIGRAM(S): at 15:53

## 2024-05-30 RX ADMIN — HEPARIN SODIUM 5000 UNIT(S): 5000 INJECTION INTRAVENOUS; SUBCUTANEOUS at 18:32

## 2024-05-30 RX ADMIN — Medication 975 MILLIGRAM(S): at 09:01

## 2024-05-30 RX ADMIN — Medication 600 MILLIGRAM(S): at 00:00

## 2024-05-30 RX ADMIN — Medication 975 MILLIGRAM(S): at 21:36

## 2024-05-30 RX ADMIN — Medication 600 MILLIGRAM(S): at 12:07

## 2024-05-30 RX ADMIN — Medication 600 MILLIGRAM(S): at 06:18

## 2024-05-30 RX ADMIN — METFORMIN HYDROCHLORIDE 500 MILLIGRAM(S): 850 TABLET ORAL at 18:32

## 2024-05-30 RX ADMIN — Medication 975 MILLIGRAM(S): at 16:50

## 2024-05-30 RX ADMIN — METFORMIN HYDROCHLORIDE 500 MILLIGRAM(S): 850 TABLET ORAL at 06:18

## 2024-05-30 RX ADMIN — Medication 600 MILLIGRAM(S): at 13:05

## 2024-05-30 RX ADMIN — Medication 975 MILLIGRAM(S): at 03:40

## 2024-05-30 RX ADMIN — Medication 975 MILLIGRAM(S): at 03:04

## 2024-05-30 NOTE — CHART NOTE - NSCHARTNOTEFT_GEN_A_CORE
Patient seen for: Nutrition consult for T2DM postpartum education prior to discharge, "Type 2 Diabetic on insulin sliding scale"    Source: Patient, Interpretation Services (ID# [id number], [place]), Electronic medical record     Patient is: ; T2DM    Chart reviewed, events noted. Meds/Labs reviewed.  Interpretation services used (ID# [id number], [place]) as pt is Croatian speaking.     Anthropometrics:  Height: 5 feet 1 inches  Pre-pregnancy weight: 159 pounds   Weight gain: 20 pounds   Admit/Dosing wt: 179 pounds     Nutrition Diagnosis:   Food and Nutrition Related Knowledge Deficit related to limited previous exposure to postpartum T2DM nutrition education and recommendations as evidenced by T2DM postpartum.    Goal: Pt to state at least two teach back points.    Intervention:  1. Education: Pt educated on postpartum dietary recommendations; Reviewed nutrition recommendations for breast feeding, including adequate protein, calorie, and fluid intake.   2. Handouts (in Ukrainian): Carbohydrate Counting for People with Diabetes, Diabetes Label Reading Nutrition Tips, Plate Method for Diabetes     Monitoring:  No other nutrition risks were identified during this encounter.  RD remains available upon request and will follow up per protocol.     Mary Manrique, LEONIDES, CDN  TEAMS

## 2024-05-30 NOTE — PROGRESS NOTE ADULT - SUBJECTIVE AND OBJECTIVE BOX
#: [id number]    OB Progress Note:  Delivery, POD#2    S: 38yo POD#2 s/p rLTCS+BS with PMHx T2DM. Her pain is well controlled. She is tolerating a regular diet and passing flatus. Denies N/V. Denies CP/SOB/lightheadedness/dizziness.   She is ambulating without difficulty.   Voiding spontaneously.     O:   Vital Signs Last 24 Hrs  T(C): 36.5 (30 May 2024 06:16), Max: 36.7 (29 May 2024 21:23)  T(F): 97.7 (30 May 2024 06:16), Max: 98.1 (29 May 2024 21:23)  HR: 62 (30 May 2024 06:16) (61 - 77)  BP: 110/72 (30 May 2024 06:16) (94/57 - 110/72)  BP(mean): --  RR: 18 (30 May 2024 06:16) (17 - 18)  SpO2: 98% (30 May 2024 06:16) (98% - 99%)    Parameters below as of 30 May 2024 06:16  Patient On (Oxygen Delivery Method): room air        Labs:  Blood type: O Positive  Rubella IgG: RPR: Negative                          9.3<L>   9.27 >-----------< 139<L>    (  @ 07:13 )             27.4<L>                  PE:  General: NAD  Abdomen: Mildly distended, appropriately tender, incision c/d/i.  Extremities: No erythema, no pitting edema

## 2024-05-30 NOTE — PROGRESS NOTE ADULT - SUBJECTIVE AND OBJECTIVE BOX
HPI:     Patient is a 39y old  Female who presents with a chief complaint of scheduled rLTCS+BS (29 May 2024 07:09)    Endocrinology consulted for diabetes management.   Most recent HbA1C: A1C with Estimated Average Glucose Result: 5.6 % (05-22-24 @ 13:02)  INTERVAL HPI/OVERNIGHT EVENTS:  Started in metformin 500mg twice daily.  No GI issues.   Currently denies polydipsia, polyuria , visual changes, numbness in feet.      metFORMIN   500 milliGRAM(s) Oral (05-30-24 @ 06:18)   500 milliGRAM(s) Oral (05-29-24 @ 18:51)      Review of systems:   CONSTITUTIONAL:  Feels well, good appetite  CARDIOVASCULAR:  Negative for chest pain or palpitations  RESPIRATORY:  Negative for cough, or SOB   GASTROINTESTINAL:  Negative for nausea, vomiting, or abdominal pain  GENITOURINARY:  Negative frequency, urgency or dysuria     CAPILLARY BLOOD GLUCOSE      POCT Blood Glucose.: 78 mg/dL (30 May 2024 12:34)  POCT Blood Glucose.: 77 mg/dL (30 May 2024 07:55)  POCT Blood Glucose.: 137 mg/dL (29 May 2024 17:06)       MEDICATIONS  (STANDING):  acetaminophen     Tablet .. 975 milliGRAM(s) Oral <User Schedule>  dextrose 10% Bolus 125 milliLiter(s) IV Bolus once  dextrose 5%. 1000 milliLiter(s) (50 mL/Hr) IV Continuous <Continuous>  dextrose 5%. 1000 milliLiter(s) (100 mL/Hr) IV Continuous <Continuous>  dextrose 50% Injectable 25 Gram(s) IV Push once  dextrose 50% Injectable 12.5 Gram(s) IV Push once  diphtheria/tetanus/pertussis (acellular) Vaccine (Adacel) 0.5 milliLiter(s) IntraMuscular once  glucagon  Injectable 1 milliGRAM(s) IntraMuscular once  heparin   Injectable 5000 Unit(s) SubCutaneous every 12 hours  ibuprofen  Tablet. 600 milliGRAM(s) Oral every 6 hours  insulin lispro (ADMELOG) corrective regimen sliding scale   SubCutaneous three times a day before meals  lactated ringers. 1000 milliLiter(s) (125 mL/Hr) IV Continuous <Continuous>  metFORMIN 500 milliGRAM(s) Oral two times a day  oxytocin Infusion 333.333 milliUNIT(s)/Min (1000 mL/Hr) IV Continuous <Continuous>  oxytocin Infusion 333.333 milliUNIT(s)/Min (1000 mL/Hr) IV Continuous <Continuous>    MEDICATIONS  (PRN):  dexAMETHasone  Injectable 4 milliGRAM(s) IV Push every 6 hours PRN Nausea  dextrose Oral Gel 15 Gram(s) Oral once PRN Blood Glucose LESS THAN 70 milliGRAM(s)/deciliter  diphenhydrAMINE 25 milliGRAM(s) Oral every 4 hours PRN Rash and/or Itching  diphenhydrAMINE 25 milliGRAM(s) Oral every 6 hours PRN Pruritus  lanolin Ointment 1 Application(s) Topical every 6 hours PRN Sore Nipples  magnesium hydroxide Suspension 30 milliLiter(s) Oral two times a day PRN Constipation  naloxone Injectable 0.1 milliGRAM(s) IV Push every 3 minutes PRN For ANY of the following changes in patient status:  A. Breaths Per Minute LESS THAN 10, B. Oxygen saturation LESS THAN 90%, C. Sedation score of 6 for Stop After: 4 Times  ondansetron Injectable 4 milliGRAM(s) IV Push every 6 hours PRN Nausea  oxyCODONE    IR 5 milliGRAM(s) Oral every 3 hours PRN Moderate to Severe Pain (4-10)  oxyCODONE    IR 5 milliGRAM(s) Oral once PRN Moderate to Severe Pain (4-10)  simethicone 80 milliGRAM(s) Chew every 4 hours PRN Gas      PHYSICAL EXAM  Vital Signs Last 24 Hrs  T(C): 36.9 (30 May 2024 13:00), Max: 36.9 (30 May 2024 13:00)  T(F): 98.5 (30 May 2024 13:00), Max: 98.5 (30 May 2024 13:00)  HR: 68 (30 May 2024 13:00) (62 - 71)  BP: 111/75 (30 May 2024 13:00) (97/62 - 111/75)  BP(mean): --  RR: 18 (30 May 2024 13:00) (18 - 18)  SpO2: 100% (30 May 2024 13:00) (98% - 100%)    Parameters below as of 30 May 2024 13:00  Patient On (Oxygen Delivery Method): room air  GENERAL: laying in bed in NAD  RESPIRATORY: nonlabored breathing, no accessory muscle use  Extremities: Warm, no edema in all 4 exts   NEURO: A&O X3    LABS:                        9.3    9.27  )-----------( 139      ( 29 May 2024 07:13 )             27.4               Thyroid Stimulating Hormone, Serum: 1.53 uIU/mL (11-17 @ 20:16)

## 2024-05-31 ENCOUNTER — APPOINTMENT (OUTPATIENT)
Dept: CV DIAGNOSITCS | Facility: HOSPITAL | Age: 39
End: 2024-05-31

## 2024-05-31 ENCOUNTER — APPOINTMENT (OUTPATIENT)
Dept: CARDIOLOGY | Facility: CLINIC | Age: 39
End: 2024-05-31

## 2024-05-31 ENCOUNTER — NON-APPOINTMENT (OUTPATIENT)
Age: 39
End: 2024-05-31

## 2024-05-31 VITALS — SYSTOLIC BLOOD PRESSURE: 107 MMHG | DIASTOLIC BLOOD PRESSURE: 72 MMHG | HEART RATE: 63 BPM

## 2024-05-31 LAB — GLUCOSE BLDC GLUCOMTR-MCNC: 79 MG/DL — SIGNIFICANT CHANGE UP (ref 70–99)

## 2024-05-31 PROCEDURE — 86780 TREPONEMA PALLIDUM: CPT

## 2024-05-31 PROCEDURE — 86803 HEPATITIS C AB TEST: CPT

## 2024-05-31 PROCEDURE — 85025 COMPLETE CBC W/AUTO DIFF WBC: CPT

## 2024-05-31 PROCEDURE — 82962 GLUCOSE BLOOD TEST: CPT

## 2024-05-31 PROCEDURE — 86850 RBC ANTIBODY SCREEN: CPT

## 2024-05-31 PROCEDURE — 86901 BLOOD TYPING SEROLOGIC RH(D): CPT

## 2024-05-31 PROCEDURE — 59050 FETAL MONITOR W/REPORT: CPT

## 2024-05-31 PROCEDURE — C1765: CPT

## 2024-05-31 PROCEDURE — 86900 BLOOD TYPING SEROLOGIC ABO: CPT

## 2024-05-31 PROCEDURE — 88307 TISSUE EXAM BY PATHOLOGIST: CPT

## 2024-05-31 PROCEDURE — 59025 FETAL NON-STRESS TEST: CPT

## 2024-05-31 PROCEDURE — 88302 TISSUE EXAM BY PATHOLOGIST: CPT

## 2024-05-31 RX ORDER — IBUPROFEN 200 MG
1 TABLET ORAL
Qty: 0 | Refills: 0 | DISCHARGE

## 2024-05-31 RX ORDER — METFORMIN HYDROCHLORIDE 850 MG/1
1 TABLET ORAL
Qty: 60 | Refills: 0
Start: 2024-05-31

## 2024-05-31 RX ORDER — ASPIRIN/CALCIUM CARB/MAGNESIUM 324 MG
1 TABLET ORAL
Refills: 0 | DISCHARGE

## 2024-05-31 RX ORDER — INSULIN GLARGINE 100 [IU]/ML
34 INJECTION, SOLUTION SUBCUTANEOUS
Refills: 0 | DISCHARGE

## 2024-05-31 RX ORDER — INSULIN LISPRO 100/ML
6 VIAL (ML) SUBCUTANEOUS
Refills: 0 | DISCHARGE

## 2024-05-31 RX ORDER — ACETAMINOPHEN 500 MG
3 TABLET ORAL
Qty: 0 | Refills: 0 | DISCHARGE

## 2024-05-31 RX ORDER — OXYCODONE HYDROCHLORIDE 5 MG/1
5 TABLET ORAL
Refills: 0 | Status: DISCONTINUED | OUTPATIENT
Start: 2024-05-31 | End: 2024-05-31

## 2024-05-31 RX ADMIN — METFORMIN HYDROCHLORIDE 500 MILLIGRAM(S): 850 TABLET ORAL at 06:42

## 2024-05-31 RX ADMIN — Medication 600 MILLIGRAM(S): at 01:32

## 2024-05-31 RX ADMIN — SIMETHICONE 80 MILLIGRAM(S): 80 TABLET, CHEWABLE ORAL at 11:49

## 2024-05-31 RX ADMIN — Medication 975 MILLIGRAM(S): at 15:35

## 2024-05-31 RX ADMIN — Medication 975 MILLIGRAM(S): at 08:24

## 2024-05-31 RX ADMIN — Medication 975 MILLIGRAM(S): at 16:00

## 2024-05-31 RX ADMIN — Medication 975 MILLIGRAM(S): at 02:30

## 2024-05-31 RX ADMIN — HEPARIN SODIUM 5000 UNIT(S): 5000 INJECTION INTRAVENOUS; SUBCUTANEOUS at 06:42

## 2024-05-31 RX ADMIN — MAGNESIUM HYDROXIDE 30 MILLILITER(S): 400 TABLET, CHEWABLE ORAL at 11:51

## 2024-05-31 RX ADMIN — Medication 600 MILLIGRAM(S): at 12:30

## 2024-05-31 RX ADMIN — Medication 975 MILLIGRAM(S): at 03:29

## 2024-05-31 RX ADMIN — Medication 600 MILLIGRAM(S): at 00:56

## 2024-05-31 RX ADMIN — Medication 975 MILLIGRAM(S): at 09:00

## 2024-05-31 RX ADMIN — Medication 600 MILLIGRAM(S): at 06:42

## 2024-05-31 RX ADMIN — Medication 600 MILLIGRAM(S): at 11:49

## 2024-05-31 NOTE — HISTORY OF PRESENT ILLNESS
[FreeTextEntry1] :  : [id number]  Ms. KIERSTEN PIÑA 38 year - old F  GA 28 weeks carries a personal history of T2DM on insulin  is here 2024 to establish care in the [facility] heart health program. Patient carries a FH of HTN and premature heart disease ( brother  s/p MI @ 33 yrs of age ) and DM ( dad ). presently denies chest pain, shortness of breath, dizziness, lightheadedness, palpitations or near syncope or syncope, orthopnea, PND and increasing lower extremity edema.  OB history: 2016 - FT -  section due to fetal distress  : FT - repeat  section  : Current pregnancy   # T2DM : Reccs per MFM  Encouraged patient to continue healthy exercise and eating habits, focusing on a Mediterranean style of eating and aiming for the recommended 150 minutes per week of moderate physical activity.  # Advised to call office with BP log in 2 week and call immediately if BP > 140/80 Encouraged Patient to monitor BP at home, keep a log, and report results back to us for evaluation.  Additionally, encouraged a heart-healthy diet and exercise as tolerated. Echocardiogram to assess the structural and valvular function.

## 2024-05-31 NOTE — PROGRESS NOTE ADULT - SUBJECTIVE AND OBJECTIVE BOX
OB Postpartum Note:  Delivery, POD#3    S: 38yo POD#3 s/p rLTCS+BS with PMHx of T2DM. The patient feels well.  Pain is well controlled. She is tolerating a regular diet and passing minimal flatus. She is voiding spontaneously, and ambulating without difficulty. Denies CP/SOB. Denies lightheadedness/dizziness. Denies N/V.    O:  Vitals:  Vital Signs Last 24 Hrs  T(C): 36.6 (31 May 2024 06:13), Max: 36.9 (30 May 2024 13:00)  T(F): 97.9 (31 May 2024 06:13), Max: 98.5 (30 May 2024 13:00)  HR: 65 (31 May 2024 06:13) (65 - 68)  BP: 115/75 (31 May 2024 06:13) (110/71 - 115/75)  BP(mean): --  RR: 18 (31 May 2024 06:13) (18 - 18)  SpO2: 97% (31 May 2024 06:13) (97% - 100%)    Parameters below as of 31 May 2024 06:13  Patient On (Oxygen Delivery Method): room air        MEDICATIONS  (STANDING):  acetaminophen     Tablet .. 975 milliGRAM(s) Oral <User Schedule>  dextrose 10% Bolus 125 milliLiter(s) IV Bolus once  dextrose 5%. 1000 milliLiter(s) (50 mL/Hr) IV Continuous <Continuous>  dextrose 5%. 1000 milliLiter(s) (100 mL/Hr) IV Continuous <Continuous>  dextrose 50% Injectable 25 Gram(s) IV Push once  dextrose 50% Injectable 12.5 Gram(s) IV Push once  diphtheria/tetanus/pertussis (acellular) Vaccine (Adacel) 0.5 milliLiter(s) IntraMuscular once  glucagon  Injectable 1 milliGRAM(s) IntraMuscular once  heparin   Injectable 5000 Unit(s) SubCutaneous every 12 hours  ibuprofen  Tablet. 600 milliGRAM(s) Oral every 6 hours  insulin lispro (ADMELOG) corrective regimen sliding scale   SubCutaneous three times a day before meals  lactated ringers. 1000 milliLiter(s) (125 mL/Hr) IV Continuous <Continuous>  metFORMIN 500 milliGRAM(s) Oral two times a day  oxytocin Infusion 333.333 milliUNIT(s)/Min (1000 mL/Hr) IV Continuous <Continuous>  oxytocin Infusion 333.333 milliUNIT(s)/Min (1000 mL/Hr) IV Continuous <Continuous>    MEDICATIONS  (PRN):  dexAMETHasone  Injectable 4 milliGRAM(s) IV Push every 6 hours PRN Nausea  dextrose Oral Gel 15 Gram(s) Oral once PRN Blood Glucose LESS THAN 70 milliGRAM(s)/deciliter  diphenhydrAMINE 25 milliGRAM(s) Oral every 6 hours PRN Pruritus  diphenhydrAMINE 25 milliGRAM(s) Oral every 4 hours PRN Rash and/or Itching  lanolin Ointment 1 Application(s) Topical every 6 hours PRN Sore Nipples  magnesium hydroxide Suspension 30 milliLiter(s) Oral two times a day PRN Constipation  naloxone Injectable 0.1 milliGRAM(s) IV Push every 3 minutes PRN For ANY of the following changes in patient status:  A. Breaths Per Minute LESS THAN 10, B. Oxygen saturation LESS THAN 90%, C. Sedation score of 6 for Stop After: 4 Times  ondansetron Injectable 4 milliGRAM(s) IV Push every 6 hours PRN Nausea  oxyCODONE    IR 5 milliGRAM(s) Oral every 3 hours PRN Moderate to Severe Pain (4-10)  oxyCODONE    IR 5 milliGRAM(s) Oral once PRN Moderate to Severe Pain (4-10)  simethicone 80 milliGRAM(s) Chew every 4 hours PRN Gas      LABS:  Blood type: O Positive  Rubella IgG: RPR: Negative                          9.3<L>   9.27 >-----------< 139<L>    (  @ 07:13 )             27.4<L>                  Physical exam:  Gen: NAD  Abdomen: Soft, nontender, no distension , firm uterine fundus at umbilicus.  Incision: Clean, dry, and intact   Pelvic: Normal lochia noted  Ext: No calf tenderness            #: [id number]    OB Postpartum Note:  Delivery, POD#3    S: 38yo POD#3 s/p rLTCS+BS with PMHx of T2DM. The patient feels well.  Pain is well controlled. She is tolerating a regular diet and passing minimal flatus. She is voiding spontaneously, and ambulating without difficulty. Denies CP/SOB. Denies lightheadedness/dizziness. Denies N/V.    O:  Vitals:  Vital Signs Last 24 Hrs  T(C): 36.6 (31 May 2024 06:13), Max: 36.9 (30 May 2024 13:00)  T(F): 97.9 (31 May 2024 06:13), Max: 98.5 (30 May 2024 13:00)  HR: 65 (31 May 2024 06:13) (65 - 68)  BP: 115/75 (31 May 2024 06:13) (110/71 - 115/75)  BP(mean): --  RR: 18 (31 May 2024 06:13) (18 - 18)  SpO2: 97% (31 May 2024 06:13) (97% - 100%)    Parameters below as of 31 May 2024 06:13  Patient On (Oxygen Delivery Method): room air        MEDICATIONS  (STANDING):  acetaminophen     Tablet .. 975 milliGRAM(s) Oral <User Schedule>  dextrose 10% Bolus 125 milliLiter(s) IV Bolus once  dextrose 5%. 1000 milliLiter(s) (50 mL/Hr) IV Continuous <Continuous>  dextrose 5%. 1000 milliLiter(s) (100 mL/Hr) IV Continuous <Continuous>  dextrose 50% Injectable 25 Gram(s) IV Push once  dextrose 50% Injectable 12.5 Gram(s) IV Push once  diphtheria/tetanus/pertussis (acellular) Vaccine (Adacel) 0.5 milliLiter(s) IntraMuscular once  glucagon  Injectable 1 milliGRAM(s) IntraMuscular once  heparin   Injectable 5000 Unit(s) SubCutaneous every 12 hours  ibuprofen  Tablet. 600 milliGRAM(s) Oral every 6 hours  insulin lispro (ADMELOG) corrective regimen sliding scale   SubCutaneous three times a day before meals  lactated ringers. 1000 milliLiter(s) (125 mL/Hr) IV Continuous <Continuous>  metFORMIN 500 milliGRAM(s) Oral two times a day  oxytocin Infusion 333.333 milliUNIT(s)/Min (1000 mL/Hr) IV Continuous <Continuous>  oxytocin Infusion 333.333 milliUNIT(s)/Min (1000 mL/Hr) IV Continuous <Continuous>    MEDICATIONS  (PRN):  dexAMETHasone  Injectable 4 milliGRAM(s) IV Push every 6 hours PRN Nausea  dextrose Oral Gel 15 Gram(s) Oral once PRN Blood Glucose LESS THAN 70 milliGRAM(s)/deciliter  diphenhydrAMINE 25 milliGRAM(s) Oral every 6 hours PRN Pruritus  diphenhydrAMINE 25 milliGRAM(s) Oral every 4 hours PRN Rash and/or Itching  lanolin Ointment 1 Application(s) Topical every 6 hours PRN Sore Nipples  magnesium hydroxide Suspension 30 milliLiter(s) Oral two times a day PRN Constipation  naloxone Injectable 0.1 milliGRAM(s) IV Push every 3 minutes PRN For ANY of the following changes in patient status:  A. Breaths Per Minute LESS THAN 10, B. Oxygen saturation LESS THAN 90%, C. Sedation score of 6 for Stop After: 4 Times  ondansetron Injectable 4 milliGRAM(s) IV Push every 6 hours PRN Nausea  oxyCODONE    IR 5 milliGRAM(s) Oral every 3 hours PRN Moderate to Severe Pain (4-10)  oxyCODONE    IR 5 milliGRAM(s) Oral once PRN Moderate to Severe Pain (4-10)  simethicone 80 milliGRAM(s) Chew every 4 hours PRN Gas      LABS:  Blood type: O Positive  Rubella IgG: RPR: Negative                          9.3<L>   9.27 >-----------< 139<L>    (  @ 07:13 )             27.4<L>                  Physical exam:  Gen: NAD  Abdomen: Soft, nontender, no distension , firm uterine fundus at umbilicus.  Incision: Clean, dry, and intact   Pelvic: Normal lochia noted  Ext: No calf tenderness

## 2024-05-31 NOTE — DISCHARGE NOTE OB - PLAN OF CARE
Make your follow-up appointment with your doctor as ordered.   No heavy lifting, driving, or strenuous activity for 6 weeks.  Nothing per vagina such as tampons, intercourse, douches or tub baths for 6 weeks or until you see your doctor.  Call your doctor if you're unable to tolerate food, increase in vaginal bleeding, or have difficulty urinating. Call your doctor if you have pain that is not relieved by your perscribed medications. Notify your doctor with any other concerns.        NS  Please f/u in 2 weeks for an incision check and for a postpartum appointment in 4-6 weeks @ [facility] located at [address] [place] [address], [address]nd floor, [place], [address]. Please call the office for an appointment ([phone]). Start Metformin ER 500mg twice a day for 1 week, week 2 Metformin ER 500mg in the morning, 1000mg in the evening, continue Metformin ER 1000mg twice a day

## 2024-05-31 NOTE — DISCUSSION/SUMMARY
[FreeTextEntry1] :  : [id number]  In summary, Ms. KIERSTEN PIÑA is a 38 year - old F  GA 28 weeks carries an FH of HTN and premature heart disease ( brother  s/p MI @ 33 yrs of age ) and dad with T2DM and personal  history of gestational DM on insulin  is here 2024 to establish care in the Women's heart health program.  OB history: 2016 - FT -  section due to fetal distress  : FT - repeat  section  : Current pregnancy   # T2DM : Reccs per MFM  Encouraged patient to continue healthy exercise and eating habits, focusing on a Mediterranean style of eating and aiming for the recommended 150 minutes per week of moderate physical activity.  # Advised to call office with BP log in 2 week and call immediately if BP > 140/80 Encouraged Patient to monitor BP at home, keep a log, and report results back to us for evaluation.  Additionally, encouraged a heart-healthy diet and exercise as tolerated. Echocardiogram to assess the structural and valvular function.

## 2024-05-31 NOTE — DISCHARGE NOTE OB - ADDITIONAL INSTRUCTIONS
Make your follow-up appointment with your doctor as ordered.   No heavy lifting, driving, or strenuous activity for 6 weeks.  Nothing per vagina such as tampons, intercourse, douches or tub baths for 6 weeks or until you see your doctor.  Call your doctor if you're unable to tolerate food, increase in vaginal bleeding, or have difficulty urinating. Call your doctor if you have pain that is not relieved by your perscribed medications. Notify your doctor with any other concerns.        NS  Please f/u in 2 weeks for an incision check and for a postpartum appointment in 4-6 weeks @ the [facility] located at [address] [place] [address], [address]nd floor, [place], [place], [address]. Please call the office for an appointment ([phone]).

## 2024-05-31 NOTE — DISCHARGE NOTE OB - PROVIDER TOKENS
FREE:[LAST:[[facility]],PHONE:[phone]],FAX:[(   )    -],ADDRESS:[[address] [place] [address]  [phone]],ESTABLISHEDPATIENT:[T]]

## 2024-05-31 NOTE — PROGRESS NOTE ADULT - ATTENDING COMMENTS
Obstetrical  8am-6pm:  Patient seen and examined by me. Agree with above resident note. Incision clean, dry and intact. Per  translating (patient preference), she is not yet passing gas. Abdominal exam - slightly distended. Discussed eating meals and snacks, walking around hourly while awake for at least 5 minutes and chewing gum to promote passing flatus.  Topher NOBLES
Obstetrical  8am-6pm:  Patient seen and examined by me. Agree with above resident note. Incision clean, dry and intact. Patient is considering discharge today vs. tomorrow. Awaiting her decision.  Topher NOBLES

## 2024-05-31 NOTE — DISCHARGE NOTE OB - HOSPITAL COURSE
Patient underwent an uncomplicated repeat LTCS and bilateral salpingectomy. , H/H as below. Patient’s postoperative course was unremarkable and she remained hemodynamically stable and afebrile throughout. Patient has hx of Type 2 DM and was seen by endocrine with recommendation for Metformin 500mg ER bid for week 1, 500mg ER in the morning and 1000mg ER in the evening for week 2 and 1000mg twice a day after that. Rx was sent to pharmacy and dosing discussed with patient. Upon discharge on POD3 the patient is ambulating and voiding spontaneously, tolerating oral intake, pain was well controlled with oral medication, and vital signs were stable.

## 2024-05-31 NOTE — DISCHARGE NOTE OB - CARE PROVIDER_API CALL
ED Extremity Problem, Lower





- General


Chief Complaint: Knee Injury


Stated Complaint: FALL/LEFT KNEE PAIN


Time Seen by Provider: 12/16/18 14:06


Mode of Arrival: Medic


Information source: Patient


Notes: 





Patient is a 37-year-old male who comes emergency room by EMS complaining of 

left knee pain.  Patient states he was coming off of a 3 foot high back back of 

the house he missed the first step and as he went down he felt his leg on the 

left side go up behind him heard a loud pop and went to the ground.  Patient 

states he was unable to get up off the ground on his own power.  He is unable 

to bear any weight on the left leg.  The knee is not able to do any flexion or 

extension on its own.  When he tries to stand he has no stability in the right 

leg at the knee joint.  He denies any other injuries.


TRAVEL OUTSIDE OF THE [place] IN LAST 30 DAYS: No





- HPI


Patient complains to provider of: Injury, Pain, Swelling


Location: Knee


Occurred: Just prior to arrival


Where: Home


Onset/Duration: Sudden, Constant


Quality of pain: Sharp, Throbbing, Other - There is no pain while sitting with 

legs outstretched.  Any type of movement pain goes to 3 out of 5


Severity: Moderate


Pain Level: 3


Context: Twisted


Recent injury: Yes


Associated symptoms: Unable to bear weight


Exacerbated by: Movement, Walking


Relieved by: Elevation, Rest





- Related Data


Allergies/Adverse Reactions: 


 





No Known Allergies Allergy (Unverified 12/16/18 13:22)


 











Past Medical History





- General


Information source: Patient





- Social History


Smoking Status: Never Smoker


Cigarette use (# per day): No


Chew tobacco use (# tins/day): No


Smoking Education Provided: No


Frequency of alcohol use: Rare


Drug Abuse: None


Lives with: Family


Family History: Reviewed & Not Pertinent





Review of Systems





- Review of Systems


Constitutional: No symptoms reported


EENT: No symptoms reported


Cardiovascular: No symptoms reported


Respiratory: No symptoms reported


Gastrointestinal: No symptoms reported


Genitourinary: No symptoms reported


Male Genitourinary: No symptoms reported


Musculoskeletal: Joint pain, Joint swelling, Leg swelling


Skin: No symptoms reported


Hematologic/Lymphatic: No symptoms reported


Neurological/Psychological: No symptoms reported


-: Yes All other systems reviewed and negative





Physical Exam





- Vital signs


Vitals: 


 











Temp Pulse Resp BP Pulse Ox


 


 98.5 F   77   16   136/87 H  98 


 


 12/16/18 13:45  12/16/18 13:45  12/16/18 13:45  12/16/18 13:45  12/16/18 13:45











Interpretation: Hypertensive





- Notes


Notes: 





PHYSICAL EXAMINATION:





GENERAL: Patient is a well-nourished well-developed 37-year-old male who is in 

no apparent distress on physical exam.  Does appear uncomfortable with any type 

of movement.





HEAD: Atraumatic, normocephalic.





EYES: Pupils equal round and reactive to light, extraocular movements intact, 

sclera anicteric, conjunctiva are normal.





NECK: Normal range of motion, supple without lymphadenopathy





LUNGS: Breath sounds clear to auscultation bilaterally and equal.  No wheezes 

rales or rhonchi.





HEART: Regular rate and rhythm without murmurs





Musculoskeletal: Examination of the area of concern is patient's left knee.  On 

examination left knee is swollen in comparison to the right.  Patient displays 

a good popliteal pulse as well as good distal pulses in the dorsalis pedis.  

Patient has flexion and extension of the ankle and total movement of toes.  

Patient is unable to extend his leg at all.  Palpation of the knee itself feels 

like the patella is riding high into the thigh.  Also moderate amount of 

effusion in the inferior portion of the cell.  Unable to perform anterior 

drawer secondary to inability to flex the knee secondary to pain and swelling.





NEUROLOGICAL:  Normal speech, normal gait.  Normal sensory, motor exams 





PSYCH: Normal mood, normal affect.





SKIN: Warm, Dry, normal turgor, no rashes or lesions noted.





Course





- Re-evaluation


Re-evalutation: 





12/16/18 15:37


Patient's CT showed ruptured patella tendon no fracture of the patella.  I 

contacted Dr[facility].  I then consulted with the patient and he has elected to just that [facility] 

[facility].





- Vital Signs


Vital signs: 


 











Temp Pulse Resp BP Pulse Ox


 


 98.5 F   77   16   136/87 H  98 


 


 12/16/18 13:45  12/16/18 13:45  12/16/18 13:45  12/16/18 13:45  12/16/18 13:45














Procedures





- Immobilization


  ** Left Knee


Pre-Proc Neuro Vasc Exam: Normal


Immobilizer type: Knee immobilizer


Performed by: PCT


Post-Proc Neuro Vasc Exam: Normal


Alignment checked and good: Yes





Discharge





- Discharge


Clinical Impression: 


Rupture of left patellar tendon


Qualifiers:


 Encounter type: initial encounter Qualified Code(s): S86.812A - Strain of 

other muscle(s) and tendon(s) at lower leg level, left leg, initial encounter





Condition: Stable


Disposition: ADMITTED AS OBSERVATION


Admitting Provider:  to Dr. Dela Cruz\orthopedics


Unit Admitted: Surgical Floor


Referrals: 


[facility] [[facility] Provider] - Follow up as needed
[facility] RYAN,   Giulia Shin   [facility] [address]  [place] [address]  [phone]  Phone: [phone]  Fax: (   )    -  Established Patient  Follow Up Time:

## 2024-05-31 NOTE — PROGRESS NOTE ADULT - ASSESSMENT
A/P: 38yo POD#1 s/p rLTCS+BS with PMHx of T2DM.  Patient is stable and doing well post-operatively.      #Postpartum  - EBL: 516  - Hct: 34.4  - Continue carb consistent diet.  - Increase ambulation.  - Continue motrin, tylenol, oxycodone PRN for pain control.    - F/u AM CBC    #Hx T2DM  - SSI   - CCD  - Endocrine consult, f/u recs     Sun Mcneil, PGY-1
  A/P: 40yo POD#3 s/p rLTCS+BS with PMHx of T2DM.  Patient is stable and is doing well post-operatively.  #Hx T2DM   - Endocrine recs: 1 week metformin ER 500bid, week 2 metformin /1000 daily, week 3 Metformin ER 1000 bid   - CC diet  - SSI    #Postpartum   - Continue regular diet.  - Increase ambulation.  - Continue motrin, tylenol, oxycodone PRN for pain control.    - EBL: 516  - Hct: 34.4-> 27.4  - Discharge planning, pending increase flatus    Sun Mcneil, PGY-1
A/P: 40yo POD#2 s/p rLTCS+BS with PMHx T2DM.  Patient is stable and doing well post-operatively.      #Hx T2DM   - Endocrine recs: 1 week metformin ER 500bid, week 2 metformin /1000 daily, week 3 Metformin ER 1000 bid   - CC diet  - SSI    #Postpartum   - Continue regular diet.  - Increase ambulation.  - Continue motrin, tylenol, oxycodone PRN for pain control.    - EBL: 516  - Hct: 34.4-> 27.4  - Declines postpartum contraception     Sun Mcneil, PGY-1
39-year-old woman with history of pre-existing type 2 diabetes, diagnosed about 6 years ago, admitted for C-sections at 37 weeks and 5 days.  Pregnancy complicated by type 2 diabetes.  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  Endocrinology consulted for management of type 2 diabetes in the postpartum setting.    1.  Type 2 diabetes  A1C:A1C with Estimated Average Glucose Result: 5.6 % (05-22-24 @ 13:02)  Home regimen: basal bolus prior to delivery.    Endocrinologist: none  Discussed with patient the importance of Carb consistent diet and exercise as tolerated.    Recommend nutritional consultation  Recommend DM education through RN staff (see physical to RN order)  Discussed glycemic goal of a1c <7% to prevent microvascular complications of diabetes mellitus and reduce the risk of macrovascular complications.   Recommend annual podiatry and ophthalmology follow up.   Glucose goal of 80-180mg/dl while in patient.   Recommend LDSS (1:50 above 150mg/dl) and night time low does sliding scale for hyperglycemia corrections    Discharge recommendation/considerations:  Given patient planned to breastfeed  Recommend stopping Januvia 100mg once daily   Recommend starting Metformin ER 500mg twice daily and up-titrate to 1000mg twice daily as outpatient  Metformin: Week 1- 500mg po bid, week 2: 500mg po qam/1000mg po pqm, and week 3: 1000mg po bid     2.  BP management  BP goal <130/80  Management as ob/gyn team   Check annual albumin/creatinine ratio    3.  Hyperlipidemia  Check fasting lipid profile outpatient.  Consider statin therapy if patient is done with breast feeding.       Given glycemic control is currently optimized and there's a plan in place for discharge planning from the endocrine team.  Endocrine team will sign off on this patient.  Please feel free to reach out to us should you have any questions or concerns or needing further assistance.  [phone]    Thank you for allowing us to participate in patient's care.

## 2024-05-31 NOTE — DISCHARGE NOTE OB - CARE PLAN
Principal Discharge DX:	 (normal spontaneous vaginal delivery)  Assessment and plan of treatment:	Make your follow-up appointment with your doctor as ordered.   No heavy lifting, driving, or strenuous activity for 6 weeks.  Nothing per vagina such as tampons, intercourse, douches or tub baths for 6 weeks or until you see your doctor.  Call your doctor if you're unable to tolerate food, increase in vaginal bleeding, or have difficulty urinating. Call your doctor if you have pain that is not relieved by your perscribed medications. Notify your doctor with any other concerns.        NS  Please f/u in 2 weeks for an incision check and for a postpartum appointment in 4-6 weeks @ the [facility] located at [address] [place] [address], 2nd floor, [place], [place], [address]. Please call the office for an appointment ([phone]).  Secondary Diagnosis:	Type 2 diabetes mellitus, without long-term current use of insulin  Assessment and plan of treatment:	Start Metformin ER 500mg twice a day for 1 week, week 2 Metformin ER 500mg in the morning, 1000mg in the evening, continue Metformin ER 1000mg twice a day   1

## 2024-05-31 NOTE — DISCHARGE NOTE OB - PATIENT PORTAL LINK FT
You can access the FollowMyHealth Patient Portal offered by [facility] by registering at the following website: [email or web address]. By joining [facility]’s FollowMyHealth portal, you will also be able to view your health information using other applications (apps) compatible with our system.

## 2024-05-31 NOTE — DISCHARGE NOTE OB - MEDICATION SUMMARY - MEDICATIONS TO TAKE
I will START or STAY ON the medications listed below when I get home from the hospital:    Motrin 600 mg oral tablet  -- 1 tab(s) by mouth every 6 hours as needed for  moderate pain  -- Indication: For Pain    Tylenol 325 mg oral tablet  -- 3 tab(s) by mouth every 6 hours as needed for  moderate pain  -- Indication: For Pain    MetFORMIN (Eqv-Glumetza) 500 mg oral tablet, extended release  -- 1 tab(s) by mouth 2 times a day Week 1 Take 1 500mg pill twice a day  Week 2 take 1 500mg pill in the morning, take  2 500mg pill at night   Week 3 Take 2 500mg pills in the morning and 2 500mg pills at night  -- Indication: For T2DM    PNV Prenatal oral tablet  -- 1 tab(s) by mouth once a day   -- May discolor urine or feces.  Take with food or milk.    -- Indication: For Postpartum care

## 2024-05-31 NOTE — DISCHARGE NOTE OB - MEDICATION SUMMARY - MEDICATIONS TO STOP TAKING
I will STOP taking the medications listed below when I get home from the hospital:    aspirin 81 mg oral capsule  -- 1 cap(s) by mouth once a day 1 tablet on even days  2 tablets on odd days    HumaLOG 100 units/mL injectable solution  -- 6 international unit(s) injectable 3 times a day    Lantus 100 units/mL subcutaneous solution  -- 34 international unit(s) subcutaneous once a day (at bedtime)

## 2024-06-04 LAB — SURGICAL PATHOLOGY STUDY: SIGNIFICANT CHANGE UP

## 2024-06-06 LAB — SURGICAL PATHOLOGY STUDY: SIGNIFICANT CHANGE UP

## 2024-06-11 ENCOUNTER — OUTPATIENT (OUTPATIENT)
Dept: OUTPATIENT SERVICES | Facility: HOSPITAL | Age: 39
LOS: 1 days | End: 2024-06-11
Payer: MEDICAID

## 2024-06-11 ENCOUNTER — APPOINTMENT (OUTPATIENT)
Dept: MATERNAL FETAL MEDICINE | Facility: CLINIC | Age: 39
End: 2024-06-11
Payer: MEDICAID

## 2024-06-11 ENCOUNTER — NON-APPOINTMENT (OUTPATIENT)
Age: 39
End: 2024-06-11

## 2024-06-11 VITALS
OXYGEN SATURATION: 99 % | WEIGHT: 157 LBS | TEMPERATURE: 98 F | SYSTOLIC BLOOD PRESSURE: 118 MMHG | HEIGHT: 61 IN | HEART RATE: 91 BPM | DIASTOLIC BLOOD PRESSURE: 82 MMHG | BODY MASS INDEX: 29.64 KG/M2

## 2024-06-11 DIAGNOSIS — M79.661 PAIN IN RIGHT LOWER LEG: ICD-10-CM

## 2024-06-11 DIAGNOSIS — O24.112 PRE-EXISTING TYPE 2 DIABETES MELLITUS, IN PREGNANCY, SECOND TRIMESTER: ICD-10-CM

## 2024-06-11 DIAGNOSIS — M79.662 PAIN IN RIGHT LOWER LEG: ICD-10-CM

## 2024-06-11 DIAGNOSIS — Z98.891 HISTORY OF UTERINE SCAR FROM PREVIOUS SURGERY: Chronic | ICD-10-CM

## 2024-06-11 PROCEDURE — G0463: CPT

## 2024-06-11 PROCEDURE — 93970 EXTREMITY STUDY: CPT | Mod: 26

## 2024-06-11 PROCEDURE — 93970 EXTREMITY STUDY: CPT

## 2024-06-11 PROCEDURE — 99212 OFFICE O/P EST SF 10 MIN: CPT | Mod: GC

## 2024-07-09 ENCOUNTER — APPOINTMENT (OUTPATIENT)
Dept: MATERNAL FETAL MEDICINE | Facility: CLINIC | Age: 39
End: 2024-07-09
Payer: MEDICAID

## 2024-07-09 ENCOUNTER — LABORATORY RESULT (OUTPATIENT)
Age: 39
End: 2024-07-09

## 2024-07-09 ENCOUNTER — OUTPATIENT (OUTPATIENT)
Dept: OUTPATIENT SERVICES | Facility: HOSPITAL | Age: 39
LOS: 1 days | End: 2024-07-09
Payer: MEDICAID

## 2024-07-09 VITALS
SYSTOLIC BLOOD PRESSURE: 110 MMHG | BODY MASS INDEX: 30.66 KG/M2 | WEIGHT: 162.38 LBS | HEIGHT: 61 IN | OXYGEN SATURATION: 98 % | HEART RATE: 79 BPM | DIASTOLIC BLOOD PRESSURE: 70 MMHG | TEMPERATURE: 97.9 F

## 2024-07-09 DIAGNOSIS — R10.32 LEFT LOWER QUADRANT PAIN: ICD-10-CM

## 2024-07-09 PROCEDURE — G0463: CPT

## 2024-07-09 PROCEDURE — 87624 HPV HI-RISK TYP POOLED RSLT: CPT

## 2024-07-09 PROCEDURE — 99213 OFFICE O/P EST LOW 20 MIN: CPT | Mod: 25,GC

## 2024-07-09 RX ORDER — CYCLOBENZAPRINE HYDROCHLORIDE 5 MG/1
5 TABLET, FILM COATED ORAL 3 TIMES DAILY
Qty: 21 | Refills: 0 | Status: ACTIVE | COMMUNITY
Start: 2024-07-09 | End: 1900-01-01

## 2024-07-10 LAB — HPV HIGH+LOW RISK DNA PNL CVX: SIGNIFICANT CHANGE UP

## 2024-07-12 LAB — CYTOLOGY SPEC DOC CYTO: SIGNIFICANT CHANGE UP

## 2024-07-15 ENCOUNTER — NON-APPOINTMENT (OUTPATIENT)
Age: 39
End: 2024-07-15

## 2024-07-21 DIAGNOSIS — R10.32 LEFT LOWER QUADRANT PAIN: ICD-10-CM

## 2024-08-14 NOTE — OB PST NOTE - WEIGHT IN LBS
Received request via: Patient    Was the patient seen in the last year in this department? Yes    Does the patient have an active prescription (recently filled or refills available) for medication(s) requested? No    Pharmacy Name: [facility]     Does the patient have prison Plus and need 100-day supply? (This applies to ALL medications) Patient does not have SCP   527

## 2024-08-15 NOTE — ED PROVIDER NOTE - PHYSICAL EXAMINATION
In responding to this request, please exercise your independent professional judgment. The fact that a question is asked does not imply that any particular answer is desired or expected. Documentation clarification is required for compliance.   This form is NOT a part of the permanent Medical Record. abd soft nt nd, normal bs  pelvic: closed os, small amount of blood in vaginal vault, no clots, no products of concetion

## 2024-08-29 ENCOUNTER — APPOINTMENT (OUTPATIENT)
Dept: INFECTIOUS DISEASE | Facility: CLINIC | Age: 39
End: 2024-08-29

## 2024-09-16 ENCOUNTER — APPOINTMENT (OUTPATIENT)
Dept: OBGYN | Facility: CLINIC | Age: 39
End: 2024-09-16
Payer: MEDICAID

## 2024-09-16 VITALS
WEIGHT: 165 LBS | OXYGEN SATURATION: 98 % | TEMPERATURE: 97.2 F | SYSTOLIC BLOOD PRESSURE: 108 MMHG | BODY MASS INDEX: 31.15 KG/M2 | HEART RATE: 81 BPM | DIASTOLIC BLOOD PRESSURE: 72 MMHG | HEIGHT: 61 IN

## 2024-09-16 DIAGNOSIS — R10.9 UNSPECIFIED ABDOMINAL PAIN: ICD-10-CM

## 2024-09-16 DIAGNOSIS — R10.2 PELVIC AND PERINEAL PAIN: ICD-10-CM

## 2024-09-16 DIAGNOSIS — R10.2 UNSPECIFIED ABDOMINAL PAIN: ICD-10-CM

## 2024-09-16 DIAGNOSIS — N76.0 ACUTE VAGINITIS: ICD-10-CM

## 2024-09-16 DIAGNOSIS — N76.2 ACUTE VULVITIS: ICD-10-CM

## 2024-09-16 PROCEDURE — 99214 OFFICE O/P EST MOD 30 MIN: CPT

## 2024-09-16 RX ORDER — FLUCONAZOLE 150 MG/1
150 TABLET ORAL
Qty: 3 | Refills: 0 | Status: ACTIVE | COMMUNITY
Start: 2024-09-16 | End: 1900-01-01

## 2024-09-16 RX ORDER — CLOTRIMAZOLE AND BETAMETHASONE DIPROPIONATE 10; .5 MG/G; MG/G
1-0.05 CREAM TOPICAL TWICE DAILY
Qty: 1 | Refills: 2 | Status: ACTIVE | COMMUNITY
Start: 2024-09-16 | End: 1900-01-01

## 2024-09-16 RX ORDER — TERCONAZOLE 8 MG/G
0.8 CREAM VAGINAL
Qty: 1 | Refills: 2 | Status: ACTIVE | COMMUNITY
Start: 2024-09-16 | End: 1900-01-01

## 2024-09-16 NOTE — REASON FOR VISIT
[Acute] : an acute visit for [Amenorrhea] : amenorrhea [Pelvic Pain] : pelvic pain [Vulvar/Vaginal Complaint] : vulvar/vaginal complaint

## 2024-09-16 NOTE — PHYSICAL EXAM
[Appropriately responsive] : appropriately responsive [Alert] : alert [No Acute Distress] : no acute distress [Soft] : soft [Non-distended] : non-distended [No Mass] : no mass [Vulvitis] : vulvitis [Labia Minora Erythema] : erythema [Labia Majora Erythema] : erythema [Discharge] : a  ~M vaginal discharge was present [Moderate] : moderate [White] : white [No Bleeding] : There was no active vaginal bleeding [Tenderness] : tender [Adnexa Tenderness] : tender [Chaperone Present] : A chaperone was present in the examining room during all aspects of the physical examination [Examination Of The Breasts] : a normal appearance [Normal] : normal [No Discharge] : no discharge [No Masses] : no breast masses were palpable [FreeTextEntry7] : diffuse tenderness

## 2024-09-16 NOTE — HISTORY OF PRESENT ILLNESS
[Patient reported PAP Smear was normal] : Patient reported PAP Smear was normal [FreeTextEntry1] : 38 yo  s/p CS and BTL 3 months ago c/o left  abdominal  quadrant pain for 2 month and vaginal discharge [PapSmeardate] : 07/24

## 2024-09-16 NOTE — COUNSELING
[Nutrition/ Exercise/ Weight Management] : nutrition, exercise, weight management [Vitamins/Supplements] : vitamins/supplements [Breast Self Exam] : breast self exam [Bladder Hygiene] : bladder hygiene [Medication Management] : medication management

## 2024-09-18 LAB
BACTERIA UR CULT: NORMAL
C TRACH RRNA SPEC QL NAA+PROBE: NOT DETECTED
CANDIDA VAG CYTO: DETECTED
G VAGINALIS+PREV SP MTYP VAG QL MICRO: NOT DETECTED
N GONORRHOEA RRNA SPEC QL NAA+PROBE: NOT DETECTED
SOURCE AMPLIFICATION: NORMAL
T VAGINALIS VAG QL WET PREP: NOT DETECTED

## 2024-09-23 NOTE — ED ADULT NURSE NOTE - DATE/TIME OF ACCEPTANCE
[FreeTextEntry1] : Toney is a 68-year-old gentleman DM. HTN, HLD, gout was recently seen by PCP and found to have abnormal ECG. No CP, palpitations, lightheadedness or dizziness. Well know to me from his wife, Crissy Reyez. 
[FreeTextEntry1] : Toney is a 68-year-old gentleman DM. HTN, HLD, gout was recently seen by PCP and found to have abnormal ECG. No CP, palpitations, lightheadedness or dizziness. Well know to me from his wife, Crissy Reyez. 
01-Dec-2018 18:25

## 2024-10-03 ENCOUNTER — RESULT REVIEW (OUTPATIENT)
Age: 39
End: 2024-10-03

## 2024-10-03 ENCOUNTER — APPOINTMENT (OUTPATIENT)
Dept: ULTRASOUND IMAGING | Facility: HOSPITAL | Age: 39
End: 2024-10-03
Payer: MEDICAID

## 2024-10-03 PROCEDURE — 76830 TRANSVAGINAL US NON-OB: CPT | Mod: 26

## 2024-10-03 PROCEDURE — 76856 US EXAM PELVIC COMPLETE: CPT | Mod: 26

## 2025-03-21 NOTE — ED ADULT NURSE NOTE - OBJECTIVE STATEMENT
Detail Level: Zone Pt c/o lower abd pain and cramping with slight vaginal bleeding starting this AM. Pt states the use of one pad this AM. Pt states last menstrual period was October 25th, 2018. Pt denies nausea, vomiting, diarrhea, or fever. Pt states that she has no hx of miscarriage. Pt presents with grimace and is tearful. Detail Level: Detailed

## 2025-07-19 NOTE — ED ADULT NURSE NOTE - SUICIDE SCREENING QUESTION 1
Time reflects when diagnosis was documented in both MDM as applicable and the Disposition within this note       Time User Action Codes Description Comment    7/19/2025  2:04 AM Adriano Ferrera Add [K57.92] Diverticulitis           ED Disposition       ED Disposition   Discharge    Condition   Stable    Date/Time   Sat Jul 19, 2025  2:04 AM    Comment   Joseluis Blake discharge to home/self care.                   Assessment & Plan       Medical Decision Making  Patient is a 62 y.o. male who presents to the ED for abdominal pain.  Patient is nontoxic, well-appearing.     Differential includes but is not limited to: Diverticulitis, nephrolithiasis.  Low suspicion for cystitis given no urinary symptoms.    Plan: Labs, CT scan, reassess                   Amount and/or Complexity of Data Reviewed  Labs: ordered. Decision-making details documented in ED Course.  Radiology: ordered and independent interpretation performed.    Risk  Prescription drug management.        ED Course as of 07/19/25 0233   Fri Jul 18, 2025   2349 Blood, UA(!): Small   Sat Jul 19, 2025   0206 Patient reevaluated.  Resting comfortably.  Discussed results and findings.  Will treat for diverticulitis.  Will discharge.  Discussed repeat BMP to evaluate kidney function early next week.  Discussed monitoring arm (contrast infiltrated) and returning for any concerns with parents.  Further strict return precautions discussed.  Patient verbalized understanding and agreed to plan of care.       Medications   acetaminophen (Ofirmev) injection 1,000 mg (0 mg Intravenous Stopped 7/19/25 0000)   sodium chloride 0.9 % bolus 1,000 mL (0 mL Intravenous Stopped 7/19/25 0227)   iohexol (OMNIPAQUE) 350 MG/ML injection (MULTI-DOSE) 100 mL (100 mL Intravenous Given 7/19/25 0124)   amoxicillin-clavulanate (AUGMENTIN) 875-125 mg per tablet 1 tablet (1 tablet Oral Given 7/19/25 0219)       ED Risk Strat Scores                    No data recorded        SBIRT 20yo+       Flowsheet Row Most Recent Value   Initial Alcohol Screen: US AUDIT-C     1. How often do you have a drink containing alcohol? 0 Filed at: 07/18/2025 2318   2. How many drinks containing alcohol do you have on a typical day you are drinking?  0 Filed at: 07/18/2025 2318   3a. Male UNDER 65: How often do you have five or more drinks on one occasion? 0 Filed at: 07/18/2025 2318   Audit-C Score 0 Filed at: 07/18/2025 2318   KRAIG: How many times in the past year have you...    Used an illegal drug or used a prescription medication for non-medical reasons? Never Filed at: 07/18/2025 2318                            History of Present Illness       Chief Complaint   Patient presents with    Abdominal Pain     Pt reports lower abd pain that has been in various spots tonight, reports has had kidney stones and has had diverticulitis and this intermittently feels similar to both       Past Medical History[1]   Past Surgical History[2]   Family History[3]   Social History[4]   E-Cigarette/Vaping    E-Cigarette Use Never User       E-Cigarette/Vaping Substances    Nicotine No     THC No     CBD No     Flavoring No     Other No     Unknown No       I have reviewed and agree with the history as documented.     62-year-old male, past medical history including diverticulitis, and nephrolithiasis, who presents the emergency room for intermittent left and right lower abdominal pain.  Most recently he has been having left lower abdominal pain.  Started several days ago.  Pain reminds him of both kidney stone as well as diverticulitis.  No modifying factors.  No associated symptoms.  No fevers, chills, vomiting, urinary symptoms.  No other complaints or concerns.      Abdominal Pain      Review of Systems   Gastrointestinal:  Positive for abdominal pain.   All other systems reviewed and are negative.          Objective       ED Triage Vitals [07/18/25 2316]   Temperature Pulse Blood Pressure Respirations SpO2 Patient Position -  Orthostatic VS   98.8 °F (37.1 °C) 86 145/79 16 97 % Sitting      Temp Source Heart Rate Source BP Location FiO2 (%) Pain Score    Oral Monitor Left arm -- 7      Vitals      Date and Time Temp Pulse SpO2 Resp BP Pain Score FACES Pain Rating User   07/19/25 0005 -- -- -- -- -- No Pain -- AM   07/19/25 0000 -- 78 96 % 19 142/73 -- -- CW   07/18/25 2345 -- -- -- -- -- 2 -- AM   07/18/25 2316 98.8 °F (37.1 °C) 86 97 % 16 145/79 7 -- CM            Physical Exam  Vitals and nursing note reviewed.   Constitutional:       General: He is not in acute distress.     Appearance: He is well-developed. He is not ill-appearing, toxic-appearing or diaphoretic.   HENT:      Head: Normocephalic and atraumatic.      Right Ear: External ear normal.      Left Ear: External ear normal.      Nose: Nose normal.     Eyes:      General: Lids are normal. No scleral icterus.      Cardiovascular:      Rate and Rhythm: Normal rate and regular rhythm.      Heart sounds: Normal heart sounds. No murmur heard.     No friction rub. No gallop.   Pulmonary:      Effort: Pulmonary effort is normal. No respiratory distress.      Breath sounds: Normal breath sounds. No wheezing or rales.   Abdominal:      Palpations: Abdomen is soft.      Tenderness: There is abdominal tenderness in the left lower quadrant. There is no guarding or rebound.     Musculoskeletal:         General: No deformity. Normal range of motion.      Cervical back: Normal range of motion and neck supple.      Right lower leg: Edema present.      Left lower leg: Edema present.     Skin:     General: Skin is warm and dry.     Neurological:      General: No focal deficit present.      Mental Status: He is alert.     Psychiatric:         Mood and Affect: Mood normal.         Behavior: Behavior normal.         Results Reviewed       Procedure Component Value Units Date/Time    HS Troponin I 2hr [[id number]]  (Normal) Collected: 07/19/25 0119    Lab Status: Final result Specimen: Blood from  Hand, Left Updated: 07/19/25 0146     hs TnI 2hr 3 ng/L      Delta 2hr hsTnI 0 ng/L     Comprehensive metabolic panel [[id number]]  (Abnormal) Collected: 07/19/25 0022    Lab Status: Final result Specimen: Blood from Arm, Left Updated: 07/19/25 0043     Sodium 137 mmol/L      Potassium 3.9 mmol/L      Chloride 106 mmol/L      CO2 26 mmol/L      ANION GAP 5 mmol/L      BUN 34 mg/dL      Creatinine 1.45 mg/dL      Glucose 104 mg/dL      Calcium 8.5 mg/dL      AST 17 U/L      ALT 19 U/L      Alkaline Phosphatase 60 U/L      Total Protein 6.6 g/dL      Albumin 3.7 g/dL      Total Bilirubin 0.34 mg/dL      eGFR 51 ml/min/1.73sq m     Narrative:      [employer] guidelines for Chronic Kidney Disease (CKD):     Stage 1 with normal or high GFR (GFR > 90 mL/min/1.73 square meters)    Stage 2 Mild CKD (GFR = 60-89 mL/min/1.73 square meters)    Stage 3A Moderate CKD (GFR = 45-59 mL/min/1.73 square meters)    Stage 3B Moderate CKD (GFR = 30-44 mL/min/1.73 square meters)    Stage 4 Severe CKD (GFR = 15-29 mL/min/1.73 square meters)    Stage 5 End Stage CKD (GFR <15 mL/min/1.73 square meters)  Note: GFR calculation is accurate only with a steady state creatinine    Urine Microscopic [[id number]]  (Normal) Collected: 07/18/25 2330    Lab Status: Final result Specimen: Urine, Clean Catch Updated: 07/19/25 0008     RBC, UA 1-2 /hpf      WBC, UA 0-1 /hpf      Epithelial Cells Occasional /hpf      Bacteria, UA Occasional /hpf     B-Type Natriuretic Peptide(BNP) [[id number]]  (Normal) Collected: 07/18/25 2325    Lab Status: Final result Specimen: Blood from Arm, Right Updated: 07/19/25 0006     BNP 55 pg/mL     HS Troponin I 4hr [[id number]]     Lab Status: No result Specimen: Blood     HS Troponin 0hr (reflex protocol) [[id number]]  (Normal) Collected: 07/18/25 2325    Lab Status: Final result Specimen: Blood from Arm, Right Updated: 07/18/25 2353     hs TnI 0hr 3 ng/L     UA w Reflex to Microscopic w Reflex to  Culture [[id number]]  (Abnormal) Collected: 07/18/25 2330    Lab Status: Final result Specimen: Urine, Clean Catch Updated: 07/18/25 2348     Color, UA Yellow     Clarity, UA Clear     Specific Gravity, UA 1.025     pH, UA 5.5     Leukocytes, UA Negative     Nitrite, UA Negative     Protein, UA Negative mg/dl      Glucose, UA Negative mg/dl      Ketones, UA Negative mg/dl      Urobilinogen, UA <2.0 mg/dl      Bilirubin, UA Negative     Occult Blood, UA Small    CBC and differential [[id number]]  (Abnormal) Collected: 07/18/25 2343    Lab Status: Final result Specimen: Blood from Hand, Right Updated: 07/18/25 2347     WBC 13.79 Thousand/uL      RBC 4.53 Million/uL      Hemoglobin 14.2 g/dL      Hematocrit 42.2 %      MCV 93 fL      MCH 31.3 pg      MCHC 33.6 g/dL      RDW 13.5 %      MPV 9.9 fL      Platelets 214 Thousands/uL      nRBC 0 /100 WBCs      Segmented % 69 %      Immature Grans % 1 %      Lymphocytes % 17 %      Monocytes % 8 %      Eosinophils Relative 4 %      Basophils Relative 1 %      Absolute Neutrophils 9.45 Thousands/µL      Absolute Immature Grans 0.09 Thousand/uL      Absolute Lymphocytes 2.38 Thousands/µL      Absolute Monocytes 1.15 Thousand/µL      Eosinophils Absolute 0.61 Thousand/µL      Basophils Absolute 0.11 Thousands/µL             CT abdomen pelvis w contrast   Final Interpretation by Filipe Rolle MD (07/19 0151)      Mild acute diverticulitis of the proximal sigmoid colon. No evidence of perforation.      No hydronephrosis. Bilateral renal cysts.         Workstation performed: [id number]         XR chest 1 view portable   ED Interpretation by Adriano Ferrera DO (07/19 0011)   No acute cardiopulmonary disease          ECG 12 Lead Documentation Only    Date/Time: 7/19/2025 2:29 AM    Performed by: Adriano Ferrera DO  Authorized by: Adriano Ferrera DO    ECG reviewed by me, the ED Provider: yes    Patient location:  ED  Interpretation:     Interpretation: normal    Rate:      ECG rate:  79    ECG rate assessment: normal    Rhythm:     Rhythm: sinus rhythm    Ectopy:     Ectopy: none    QRS:     QRS axis:  Normal  Conduction:     Conduction: abnormal      Abnormal conduction: incomplete RBBB    ST segments:     ST segments:  Normal  T waves:     T waves: normal        ED Medication and Procedure Management   Prior to Admission Medications   Prescriptions Last Dose Informant Patient Reported? Taking?   Ergocalciferol (VITAMIN D2 PO) 2025 Self Yes Yes   Sig: Take by mouth   Potassium Citrate ER 15 MEQ (1620 MG) TBCR 2025 Self Yes Yes   allopurinol (ZYLOPRIM) 300 mg tablet 2025  No Yes   Sig: TAKE 1 TABLET EVERY DAY   amLODIPine (NORVASC) 5 mg tablet 2025 Self No Yes   Sig: TAKE 1 TABLET EVERY DAY   fluticasone (FLONASE) 50 mcg/act nasal spray   No No   Si spray into each nostril 2 (two) times a day for 3 days   levothyroxine 200 mcg tablet 2025  No Yes   Sig: TAKE 1 TABLET EVERY DAY   levothyroxine 25 mcg tablet   No No   Sig: TAKE 1/2 TABLET BY MOUTH DAILY (GENERIC FOR SYNTHROID)   lisinopril-hydrochlorothiazide (PRINZIDE,ZESTORETIC) 20-25 MG per tablet 2025 Self No Yes   Sig: TAKE 1 TABLET EVERY DAY   omeprazole (PriLOSEC) 20 mg delayed release capsule 2025 Self No Yes   Sig: TAKE 1 CAPSULE EVERY DAY   potassium citrate (UROCIT-K) 10 mEq 2025 Self Yes Yes   Sig: Take 10 mEq by mouth in the morning and 10 mEq at noon and 10 mEq in the evening. Take with meals.   rosuvastatin (CRESTOR) 5 mg tablet 2025  No Yes   Sig: Take 1 tablet (5 mg total) by mouth daily   sildenafil (VIAGRA) 100 mg tablet 2025  No Yes   Sig: TAKE 1 TABLET BY MOUTH DAILY AS NEEDED FOR ERECTILE DYSFUNCTION (GENERIC FOR VIAGRA)      Facility-Administered Medications: None     Discharge Medication List as of 2025  2:06 AM        START taking these medications    Details   amoxicillin-clavulanate (AUGMENTIN) 875-125 mg per tablet Take 1 tablet by mouth every 12  (twelve) hours for 7 days, Starting Sat 7/19/2025, Until Sat 7/26/2025, Normal           CONTINUE these medications which have NOT CHANGED    Details   allopurinol (ZYLOPRIM) 300 mg tablet TAKE 1 TABLET EVERY DAY, Starting Thu 4/17/2025, Normal      amLODIPine (NORVASC) 5 mg tablet TAKE 1 TABLET EVERY DAY, Starting Tue 2/11/2025, Normal      Ergocalciferol (VITAMIN D2 PO) Take by mouth, Historical Med      !! levothyroxine 200 mcg tablet TAKE 1 TABLET EVERY DAY, Starting Wed 7/9/2025, Normal      lisinopril-hydrochlorothiazide (PRINZIDE,ZESTORETIC) 20-25 MG per tablet TAKE 1 TABLET EVERY DAY, Starting Tue 2/11/2025, Normal      omeprazole (PriLOSEC) 20 mg delayed release capsule TAKE 1 CAPSULE EVERY DAY, Starting Thu 12/19/2024, Normal      !! potassium citrate (UROCIT-K) 10 mEq Take 10 mEq by mouth in the morning and 10 mEq at noon and 10 mEq in the evening. Take with meals., Starting Fri 3/8/2024, Historical Med      !! Potassium Citrate ER 15 MEQ (1620 MG) TBCR Historical Med      rosuvastatin (CRESTOR) 5 mg tablet Take 1 tablet (5 mg total) by mouth daily, Starting Thu 3/27/2025, Normal      sildenafil (VIAGRA) 100 mg tablet TAKE 1 TABLET BY MOUTH DAILY AS NEEDED FOR ERECTILE DYSFUNCTION (GENERIC FOR VIAGRA), Normal      fluticasone (FLONASE) 50 mcg/act nasal spray 1 spray into each nostril 2 (two) times a day for 3 days, Starting Tue 5/27/2025, Until Fri 5/30/2025, Normal      !! levothyroxine 25 mcg tablet TAKE 1/2 TABLET BY MOUTH DAILY (GENERIC FOR SYNTHROID), Normal       !! - Potential duplicate medications found. Please discuss with provider.        Outpatient Discharge Orders   Basic metabolic panel   Standing Status: Future Standing Exp. Date: 09/19/26     ED SEPSIS DOCUMENTATION   Time reflects when diagnosis was documented in both MDM as applicable and the Disposition within this note       Time User Action Codes Description Comment    7/19/2025  2:04 AM Adriano Ferrera Add [K57.92] Diverticulitis   "                  [1]   Past Medical History:  Diagnosis Date    Borderline diabetes     per pt A1C \"coming down\"    Cancer (HCC)     Chronic pain disorder     lower back-s/p laminectomy    Claustrophobia     \"some degree\" jes MRI's\"    CPAP (continuous positive airway pressure) dependence     per pt does not use CPAP    Diverticulitis     Diverticulitis of colon     GERD (gastroesophageal reflux disease)     Gout     Hypertension     Kidney stone     x 2 episodes    Lactose intolerance     Localized pain of joint     Mild sleep apnea     CPAP started in July having difficulty using- only has used on occ    Morbid obesity with body mass index (BMI) of 50.0 to 59.9 in adult (HCC)     Obesity many years    Papillary thyroid carcinoma (HCC)     Rectal bleeding     occ rectal bleeding last episode unsure-\"nothing recent\"    Seasickness     Skin tag     skin tags were removed-as of 8/2/19 has a few more    Teeth missing     Thyroid nodule     Tinnitus     Wears glasses     Weight loss     \"80lbs and did gain 40lbs back\"--   [2]   Past Surgical History:  Procedure Laterality Date    BACK SURGERY      laminectomy-1996, 2010    COLONOSCOPY      x2    ESOPHAGOGASTRODUODENOSCOPY N/A 03/30/2019    Procedure: ESOPHAGOGASTRODUODENOSCOPY (EGD);  Surgeon: Kory Suresh MD;  Location: [facility];  Service: Gastroenterology    FL RETROGRADE PYELOGRAM  09/16/2022    FL RETROGRADE PYELOGRAM  09/22/2022    FL RETROGRADE PYELOGRAM  09/29/2022    KNEE ARTHROSCOPY Left 2007    IN CYSTO/URETERO W/LITHOTRIPSY &INDWELL STENT INSRT Right 01/06/2022    Procedure: CYSTOSCOPY URETEROSCOPY AND INSERTION STENT URETERAL RIGHT;  Surgeon: Won Lawton MD;  Location: [facility];  Service: Urology    IN CYSTO/URETERO W/LITHOTRIPSY &INDWELL STENT INSRT Right 09/22/2022    Procedure: CYSTOSCOPY URETEROSCOPY WITH LITHOTRIPSY HOLMIUM LASER, BASKET EXTRACTION, RETROGRADE PYELOGRAM (BILATERAL) AND  STENT EXCHANGE;  Surgeon: Won Lawton MD;  Location: " [facility];  Service: Urology    TN CYSTO/URETERO W/LITHOTRIPSY &INDWELL STENT INSRT Bilateral 09/29/2022    Procedure: CYSTOSCOPY URETEROSCOPY, STENT MANUPILATION, RETROGRADE PYELOGRAM, LEFT  URETERAL STENT NSERTION, AND  REMOVAL OF RIGHT STENT;  Surgeon: Won Lawton MD;  Location: [facility];  Service: Urology    TN CYSTO/URETERO W/LITHOTRIPSY &INDWELL STENT INSRT Left 10/13/2022    Procedure: CYSTOSCOPY URETEROSCOPY WITH LITHOTRIPSY BASKET EXTRACTION, RETROGRADE PYELOGRAM AND  STENT EXCHANGE;  Surgeon: Won Lawton MD;  Location: [facility];  Service: Urology    TN CYSTOURETHROSCOPY W/URETERAL CATHETERIZATION Right 09/16/2022    Procedure: CYSTOSCOPY RETROGRADE PYELOGRAM WITH INSERTION STENT URETERAL retrograde;  Surgeon: Won Lawton MD;  Location: [facility];  Service: Urology    TN ESOPHAGOGASTRODUODENOSCOPY TRANSORAL DIAGNOSTIC N/A 05/01/2019    Procedure: ESOPHAGOGASTRODUODENOSCOPY (EGD);  Surgeon: Kory Suresh MD;  Location: [facility];  Service: Gastroenterology    TN EXC B9 LESION MRGN XCP SK TG T/A/L >4.0 CM N/A 02/21/2025    Procedure: EXCISION LIPOMA;  Surgeon: Haroon Patel MD;  Location: [facility];  Service: General    TN THYROIDECTOMY RMVL REMAINING TISS FLWG PRTL RMVL Right 02/08/2023    Procedure: COMPLETION THYROIDECTOMY;  Surgeon: Timbo Bolaños MD;  Location: [facility];  Service: ENT    TN TOTAL THYROID LOBECTOMY UNI W/WO ISTHMUSECTOMY Left 08/31/2022    Procedure: Left patial THYROIDECTOMY;  Surgeon: Timbo Bolaños MD;  Location:  MAIN OR;  Service: ENT    THYROID SURGERY      UPPER GASTROINTESTINAL ENDOSCOPY  2006    US GUIDED THYROID BIOPSY  12/28/2020   [3]   Family History  Problem Relation Name Age of Onset    Osteoarthritis Mother      Obesity Father          [id number]    Sleep apnea Father          with CPAP    No Known Problems Sister      Lupus Sister      No Known Problems Sister      No Known Problems Brother      No Known Problems Son       Substance Abuse Neg Hx      Mental illness Neg Hx      Cancer Neg Hx      Diabetes Neg Hx      Heart disease Neg Hx      Stroke Neg Hx     [4]   Social History  Tobacco Use    Smoking status: Never     Passive exposure: Never    Smokeless tobacco: Never   Vaping Use    Vaping status: Never Used   Substance Use Topics    Alcohol use: No    Drug use: No        Adriano Ferrera DO  07/19/25 0233     No